# Patient Record
Sex: FEMALE | Race: WHITE | Employment: OTHER | URBAN - NONMETROPOLITAN AREA
[De-identification: names, ages, dates, MRNs, and addresses within clinical notes are randomized per-mention and may not be internally consistent; named-entity substitution may affect disease eponyms.]

---

## 2017-01-28 ENCOUNTER — HOSPITAL ENCOUNTER (OUTPATIENT)
Dept: MRI IMAGING | Age: 82
Discharge: HOME | End: 2017-01-28
Attending: INTERNAL MEDICINE | Admitting: INTERNAL MEDICINE

## 2017-01-28 DIAGNOSIS — R41.3 MEMORY LOSS: ICD-10-CM

## 2017-01-28 DIAGNOSIS — R41.3 OTHER AMNESIA: ICD-10-CM

## 2017-04-03 ENCOUNTER — OFFICE VISIT (OUTPATIENT)
Dept: CARDIOLOGY CLINIC | Age: 82
End: 2017-04-03

## 2017-04-03 VITALS
DIASTOLIC BLOOD PRESSURE: 70 MMHG | HEART RATE: 60 BPM | SYSTOLIC BLOOD PRESSURE: 134 MMHG | HEIGHT: 63 IN | BODY MASS INDEX: 30.12 KG/M2 | WEIGHT: 170 LBS | RESPIRATION RATE: 14 BRPM

## 2017-04-03 DIAGNOSIS — Z79.899 ENCOUNTER FOR MONITORING FLECAINIDE THERAPY: ICD-10-CM

## 2017-04-03 DIAGNOSIS — I10 ESSENTIAL HYPERTENSION: Chronic | ICD-10-CM

## 2017-04-03 DIAGNOSIS — I48.0 PAF (PAROXYSMAL ATRIAL FIBRILLATION) (HCC): ICD-10-CM

## 2017-04-03 DIAGNOSIS — I48.91 ATRIAL FIBRILLATION, UNSPECIFIED TYPE (HCC): Primary | ICD-10-CM

## 2017-04-03 DIAGNOSIS — Z51.81 ENCOUNTER FOR MONITORING FLECAINIDE THERAPY: ICD-10-CM

## 2017-04-03 PROCEDURE — 93000 ELECTROCARDIOGRAM COMPLETE: CPT | Performed by: INTERNAL MEDICINE

## 2017-04-03 PROCEDURE — 99213 OFFICE O/P EST LOW 20 MIN: CPT | Performed by: INTERNAL MEDICINE

## 2017-04-03 RX ORDER — NITROGLYCERIN 0.4 MG/1
0.4 TABLET SUBLINGUAL EVERY 5 MIN PRN
COMMUNITY
End: 2017-10-09

## 2017-04-03 RX ORDER — FLECAINIDE ACETATE 50 MG/1
50 TABLET ORAL 2 TIMES DAILY
COMMUNITY
End: 2017-04-03 | Stop reason: SDUPTHER

## 2017-04-03 RX ORDER — DONEPEZIL HYDROCHLORIDE 10 MG/1
10 TABLET, FILM COATED ORAL NIGHTLY
COMMUNITY

## 2017-04-03 RX ORDER — LEVOTHYROXINE SODIUM 88 UG/1
88 TABLET ORAL DAILY
COMMUNITY
End: 2017-10-09 | Stop reason: DRUGHIGH

## 2017-04-03 RX ORDER — BACLOFEN 10 MG/1
10 TABLET ORAL 2 TIMES DAILY
COMMUNITY

## 2017-04-03 RX ORDER — FLECAINIDE ACETATE 50 MG/1
50 TABLET ORAL 2 TIMES DAILY
Qty: 180 TABLET | Refills: 3 | Status: SHIPPED | OUTPATIENT
Start: 2017-04-03 | End: 2020-09-22 | Stop reason: SDUPTHER

## 2017-10-01 ENCOUNTER — HOSPITAL ENCOUNTER (OUTPATIENT)
Dept: OTHER | Age: 82
Discharge: HOME | End: 2017-10-31
Attending: INTERNAL MEDICINE | Admitting: INTERNAL MEDICINE

## 2017-10-09 ENCOUNTER — OFFICE VISIT (OUTPATIENT)
Dept: CARDIOLOGY CLINIC | Age: 82
End: 2017-10-09

## 2017-10-09 VITALS
HEIGHT: 63 IN | BODY MASS INDEX: 27.82 KG/M2 | HEART RATE: 64 BPM | WEIGHT: 157 LBS | SYSTOLIC BLOOD PRESSURE: 118 MMHG | DIASTOLIC BLOOD PRESSURE: 70 MMHG | RESPIRATION RATE: 16 BRPM

## 2017-10-09 DIAGNOSIS — I48.0 PAF (PAROXYSMAL ATRIAL FIBRILLATION) (HCC): ICD-10-CM

## 2017-10-09 DIAGNOSIS — Z51.81 ENCOUNTER FOR MONITORING FLECAINIDE THERAPY: ICD-10-CM

## 2017-10-09 DIAGNOSIS — Z79.899 ENCOUNTER FOR MONITORING FLECAINIDE THERAPY: ICD-10-CM

## 2017-10-09 DIAGNOSIS — I10 ESSENTIAL HYPERTENSION: Primary | Chronic | ICD-10-CM

## 2017-10-09 PROCEDURE — 99213 OFFICE O/P EST LOW 20 MIN: CPT | Performed by: INTERNAL MEDICINE

## 2017-10-09 PROCEDURE — 93000 ELECTROCARDIOGRAM COMPLETE: CPT | Performed by: INTERNAL MEDICINE

## 2017-10-09 RX ORDER — LEVOTHYROXINE SODIUM 0.07 MG/1
75 TABLET ORAL DAILY
COMMUNITY

## 2017-10-16 LAB
INR BLD: 1.18 INDEX
PROTHROMBIN TIME: 13.5 SECONDS (ref 9.12–12.5)

## 2017-10-23 LAB
INR BLD: 1.23 INDEX
PROTHROMBIN TIME: 14.1 SECONDS

## 2017-11-01 ENCOUNTER — HOSPITAL ENCOUNTER (OUTPATIENT)
Dept: OTHER | Age: 82
Discharge: HOME | End: 2017-11-30
Attending: INTERNAL MEDICINE | Admitting: INTERNAL MEDICINE

## 2017-11-02 LAB
INR BLD: 2.42 INDEX
PROTHROMBIN TIME: 28.4 SECONDS

## 2017-11-09 LAB
INR BLD: 2.94 INDEX
PROTHROMBIN TIME: 34.6 SECONDS

## 2017-11-16 LAB
INR BLD: 2.51 INDEX
PROTHROMBIN TIME: 29.4 SECONDS (ref 9.12–12.5)

## 2017-11-30 LAB
INR BLD: 3.46 INDEX
PROTHROMBIN TIME: 41 SECONDS (ref 9.12–12.5)

## 2017-12-01 ENCOUNTER — HOSPITAL ENCOUNTER (OUTPATIENT)
Dept: OTHER | Age: 82
Discharge: HOME | End: 2017-12-31
Attending: INTERNAL MEDICINE | Admitting: INTERNAL MEDICINE

## 2017-12-07 LAB
INR BLD: 2.37 INDEX
PROTHROMBIN TIME: 26.8 SECONDS (ref 9.12–12.5)

## 2017-12-14 LAB
INR BLD: 2.5 INDEX
PROTHROMBIN TIME: 28.7 SECONDS

## 2017-12-21 LAB
INR BLD: 2.65 INDEX
PROTHROMBIN TIME: 30.5 SECONDS

## 2018-01-01 ENCOUNTER — HOSPITAL ENCOUNTER (OUTPATIENT)
Dept: OTHER | Age: 83
Discharge: HOME | End: 2018-01-31
Attending: INTERNAL MEDICINE | Admitting: INTERNAL MEDICINE

## 2018-01-04 LAB
INR BLD: 2.45 INDEX
PROTHROMBIN TIME: 27.7 SECONDS (ref 9.12–12.5)

## 2018-01-11 LAB
INR BLD: 2.29 INDEX
PROTHROMBIN TIME: 25.9 SECONDS (ref 9.12–12.5)

## 2018-01-18 LAB
INR BLD: 1.58 INDEX
PROTHROMBIN TIME: 18.3 SECONDS

## 2018-01-22 ENCOUNTER — HOSPITAL ENCOUNTER (OUTPATIENT)
Dept: GENERAL RADIOLOGY | Age: 83
Discharge: HOME | End: 2018-01-22
Attending: ORTHOPAEDIC SURGERY | Admitting: ORTHOPAEDIC SURGERY

## 2018-01-22 DIAGNOSIS — M25.551 HIP PAIN, RIGHT: ICD-10-CM

## 2018-01-25 LAB
INR BLD: 1.66 INDEX
PROTHROMBIN TIME: 19.2 SECONDS

## 2018-02-01 ENCOUNTER — HOSPITAL ENCOUNTER (OUTPATIENT)
Dept: OTHER | Age: 83
Discharge: HOME | End: 2018-02-28
Attending: INTERNAL MEDICINE | Admitting: INTERNAL MEDICINE

## 2018-02-01 LAB
INR BLD: 3.1 INDEX
PROTHROMBIN TIME: 34.9 SECONDS (ref 9.12–12.5)

## 2018-02-08 LAB
INR BLD: 4.65 INDEX
PROTHROMBIN TIME: 52.2 SECONDS (ref 9.12–12.5)

## 2018-02-15 LAB
INR BLD: 1.22 INDEX
PROTHROMBIN TIME: 13.9 SECONDS (ref 9.12–12.5)

## 2018-03-01 ENCOUNTER — HOSPITAL ENCOUNTER (OUTPATIENT)
Dept: OTHER | Age: 83
Discharge: HOME | End: 2018-03-31
Attending: INTERNAL MEDICINE | Admitting: INTERNAL MEDICINE

## 2018-03-01 LAB
INR BLD: 4.49 INDEX
PROTHROMBIN TIME: 50.4 SECONDS (ref 9.12–12.5)

## 2018-03-15 LAB
INR BLD: 2.06 INDEX
PROTHROMBIN TIME: 23.3 SECONDS (ref 9.12–12.5)

## 2018-03-22 LAB
INR BLD: 2.4 INDEX
PROTHROMBIN TIME: 27.1 SECONDS (ref 9.12–12.5)

## 2018-04-01 ENCOUNTER — HOSPITAL ENCOUNTER (OUTPATIENT)
Dept: OTHER | Age: 83
Discharge: HOME | End: 2018-04-30
Attending: INTERNAL MEDICINE | Admitting: INTERNAL MEDICINE

## 2018-04-12 LAB
INR BLD: 2.66 INDEX
PROTHROMBIN TIME: 30 SECONDS (ref 9.12–12.5)

## 2018-05-01 ENCOUNTER — HOSPITAL ENCOUNTER (OUTPATIENT)
Dept: OTHER | Age: 83
Discharge: HOME | End: 2018-05-31
Attending: INTERNAL MEDICINE | Admitting: INTERNAL MEDICINE

## 2018-05-10 LAB
INR BLD: 3.51 INDEX
PROTHROMBIN TIME: 39.5 SECONDS (ref 9.12–12.5)

## 2018-05-23 LAB
BUN BLDV-MCNC: 22 MG/DL
CALCIUM SERPL-MCNC: NORMAL MG/DL
CHLORIDE BLD-SCNC: 98 MMOL/L
CO2: NORMAL MMOL/L
CREAT SERPL-MCNC: 1 MG/DL
GFR CALCULATED: NORMAL
GLUCOSE BLD-MCNC: 91 MG/DL
INR BLD: 2.1
INR BLD: 2.42 INDEX
POTASSIUM SERPL-SCNC: 4.2 MMOL/L
PROTHROMBIN TIME: 27.8 SECONDS
PROTIME: 21.7 SECONDS
SODIUM BLD-SCNC: 138 MMOL/L

## 2018-06-01 ENCOUNTER — HOSPITAL ENCOUNTER (OUTPATIENT)
Dept: OTHER | Age: 83
Discharge: HOME | End: 2018-06-30
Attending: INTERNAL MEDICINE | Admitting: INTERNAL MEDICINE

## 2018-06-26 ENCOUNTER — OFFICE VISIT (OUTPATIENT)
Dept: CARDIOLOGY CLINIC | Age: 83
End: 2018-06-26

## 2018-06-26 VITALS
SYSTOLIC BLOOD PRESSURE: 128 MMHG | DIASTOLIC BLOOD PRESSURE: 72 MMHG | HEIGHT: 63 IN | RESPIRATION RATE: 16 BRPM | BODY MASS INDEX: 27.64 KG/M2 | HEART RATE: 56 BPM | WEIGHT: 156 LBS

## 2018-06-26 DIAGNOSIS — I48.0 PAF (PAROXYSMAL ATRIAL FIBRILLATION) (HCC): Primary | ICD-10-CM

## 2018-06-26 DIAGNOSIS — R01.1 HEART MURMUR: ICD-10-CM

## 2018-06-26 PROCEDURE — 99213 OFFICE O/P EST LOW 20 MIN: CPT | Performed by: INTERNAL MEDICINE

## 2018-08-01 ENCOUNTER — HOSPITAL ENCOUNTER (OUTPATIENT)
Dept: OTHER | Age: 83
Discharge: HOME | End: 2018-08-31
Attending: INTERNAL MEDICINE | Admitting: INTERNAL MEDICINE

## 2018-08-10 LAB
INR BLD: 1.21 INDEX
PROTHROMBIN TIME: 13.8 SECONDS (ref 9.12–12.5)

## 2018-08-24 LAB
INR BLD: 1.84 INDEX
PROTHROMBIN TIME: 21.2 SECONDS

## 2018-08-31 LAB
INR BLD: 1.18 INDEX
PROTHROMBIN TIME: 13.4 SECONDS (ref 9.12–12.5)

## 2018-09-01 ENCOUNTER — HOSPITAL ENCOUNTER (OUTPATIENT)
Dept: OTHER | Age: 83
Discharge: HOME | End: 2018-09-01
Attending: INTERNAL MEDICINE | Admitting: INTERNAL MEDICINE

## 2018-09-07 LAB
INR BLD: 1.52 INDEX
PROTHROMBIN TIME: 17.6 SECONDS

## 2018-09-14 LAB
INR BLD: 2.79 INDEX
PROTHROMBIN TIME: 31.5 SECONDS (ref 9.12–12.5)

## 2018-09-21 LAB
INR BLD: 3.68 INDEX
PROTHROMBIN TIME: 42.1 SECONDS (ref 9.12–12.5)

## 2018-09-25 ENCOUNTER — TELEPHONE (OUTPATIENT)
Dept: CARDIOLOGY CLINIC | Age: 83
End: 2018-09-25

## 2018-09-25 NOTE — TELEPHONE ENCOUNTER
Received cardiac clearance request from Dr. Melchor Beltre  for right total hip replacement under general anesthesia. Date of surgery is not yet scheduled. Cardiac risk assessment form and patient's info to Dr. Jacqueline Masterson for review/completion. [id number] Score for Atrial Fibrillation Stroke Risk   Risk   Factors  Component Value   C CHF No 0   H HTN Yes 1   A2 Age >= 76 Yes,  (82 y.o.) 2   D DM No 0   S2 Prior Stroke/TIA No 0   V Vascular Disease No 0   A Age 74-69 No,  (82 y.o.) 0   Sc Sex female 1    [id number]  Score  4   Score last updated 0/31/62 99:11 AM    Click here for a link to the UpToDate guideline \"Atrial Fibrillation: Anticoagulation therapy to prevent embolization    Disclaimer: Risk Score calculation is dependent on accuracy of patient problem list and past encounter diagnosis.

## 2018-09-27 NOTE — TELEPHONE ENCOUNTER
Dr. Rocio Otero completed cardiac risk assessment form and cleared patient to proceed with her surgery. Cardiac risk assessment letter prepared (may be seen under the \"Letters\" tab in [facility]) and faxed to Dr. Elena David office at fax # [phone] per Josse Keating MA. See also cardiac risk assessment form attached.

## 2018-09-28 ENCOUNTER — HOSPITAL ENCOUNTER (OUTPATIENT)
Age: 83
Setting detail: SPECIMEN
Discharge: HOME | End: 2018-09-28
Payer: MEDICARE

## 2018-09-28 LAB
INR BLD: 1.54 INDEX
PROTHROMBIN TIME: 17.5 SECONDS (ref 9.12–12.5)

## 2018-09-28 PROCEDURE — 36415 COLL VENOUS BLD VENIPUNCTURE: CPT

## 2018-09-28 PROCEDURE — 85610 PROTHROMBIN TIME: CPT

## 2018-10-05 ENCOUNTER — HOSPITAL ENCOUNTER (OUTPATIENT)
Age: 83
Setting detail: SPECIMEN
Discharge: HOME | End: 2018-10-05
Payer: MEDICARE

## 2018-10-05 LAB
INR BLD: 3.06 INDEX
PROTHROMBIN TIME: 35.1 SECONDS (ref 9.12–12.5)

## 2018-10-05 PROCEDURE — 36415 COLL VENOUS BLD VENIPUNCTURE: CPT

## 2018-10-05 PROCEDURE — 85610 PROTHROMBIN TIME: CPT

## 2018-10-12 ENCOUNTER — HOSPITAL ENCOUNTER (OUTPATIENT)
Age: 83
Setting detail: SPECIMEN
Discharge: HOME | End: 2018-10-12
Payer: MEDICARE

## 2018-10-12 LAB
INR BLD: 1.71 INDEX
PROTHROMBIN TIME: 19.4 SECONDS (ref 9.12–12.5)

## 2018-10-12 PROCEDURE — 85610 PROTHROMBIN TIME: CPT

## 2018-10-12 PROCEDURE — 36415 COLL VENOUS BLD VENIPUNCTURE: CPT

## 2018-10-19 PROCEDURE — 99305 1ST NF CARE MODERATE MDM 35: CPT | Performed by: INTERNAL MEDICINE

## 2018-10-22 ENCOUNTER — HOSPITAL ENCOUNTER (OUTPATIENT)
Age: 83
Setting detail: SPECIMEN
Discharge: HOME | End: 2018-10-22
Payer: MEDICARE

## 2018-10-22 LAB
ALBUMIN SERPL-MCNC: 3.3 GM/DL (ref 3.4–5)
ALP BLD-CCNC: 52 IU/L (ref 40–129)
ALT SERPL-CCNC: 5 U/L (ref 10–40)
ANION GAP SERPL CALCULATED.3IONS-SCNC: 14 MMOL/L (ref 4–16)
AST SERPL-CCNC: 11 IU/L (ref 15–37)
BILIRUB SERPL-MCNC: 1.3 MG/DL (ref 0–1)
BUN BLDV-MCNC: 14 MG/DL (ref 6–23)
CALCIUM SERPL-MCNC: 9.1 MG/DL (ref 8.3–10.6)
CHLORIDE BLD-SCNC: 96 MMOL/L (ref 99–110)
CO2: 29 MMOL/L (ref 21–32)
CREAT SERPL-MCNC: 0.8 MG/DL (ref 0.6–1.1)
GFR AFRICAN AMERICAN: >60 ML/MIN/1.73M2
GFR NON-AFRICAN AMERICAN: >60 ML/MIN/1.73M2
GLUCOSE BLD-MCNC: 114 MG/DL (ref 70–99)
HCT VFR BLD CALC: 37.2 % (ref 37–47)
HEMOGLOBIN: 11.6 GM/DL (ref 12.5–16)
INR BLD: 2.03 INDEX
MCH RBC QN AUTO: 29.5 PG (ref 27–31)
MCHC RBC AUTO-ENTMCNC: 31.2 % (ref 32–36)
MCV RBC AUTO: 94.7 FL (ref 78–100)
PDW BLD-RTO: 13.6 % (ref 11.7–14.9)
PLATELET # BLD: 310 K/CU MM (ref 140–440)
PMV BLD AUTO: 9.5 FL (ref 7.5–11.1)
POTASSIUM SERPL-SCNC: 3.5 MMOL/L (ref 3.5–5.1)
PRO-BNP: 348.4 PG/ML
PROTHROMBIN TIME: 23 SECONDS (ref 9.12–12.5)
RBC # BLD: 3.93 M/CU MM (ref 4.2–5.4)
SODIUM BLD-SCNC: 139 MMOL/L (ref 135–145)
TOTAL PROTEIN: 5.9 GM/DL (ref 6.4–8.2)
WBC # BLD: 9.9 K/CU MM (ref 4–10.5)

## 2018-10-22 PROCEDURE — 85027 COMPLETE CBC AUTOMATED: CPT

## 2018-10-22 PROCEDURE — 83880 ASSAY OF NATRIURETIC PEPTIDE: CPT

## 2018-10-22 PROCEDURE — 80053 COMPREHEN METABOLIC PANEL: CPT

## 2018-10-22 PROCEDURE — 85610 PROTHROMBIN TIME: CPT

## 2018-10-22 PROCEDURE — 36415 COLL VENOUS BLD VENIPUNCTURE: CPT

## 2018-10-24 ENCOUNTER — HOSPITAL ENCOUNTER (OUTPATIENT)
Age: 83
Setting detail: SPECIMEN
Discharge: HOME | End: 2018-10-24
Payer: COMMERCIAL

## 2018-10-24 LAB
INR BLD: 2.91 INDEX
PROTHROMBIN TIME: 32.8 SECONDS (ref 9.12–12.5)

## 2018-10-24 PROCEDURE — 85610 PROTHROMBIN TIME: CPT

## 2018-10-24 PROCEDURE — 36415 COLL VENOUS BLD VENIPUNCTURE: CPT

## 2018-10-25 ENCOUNTER — HOSPITAL ENCOUNTER (OUTPATIENT)
Age: 83
Setting detail: SPECIMEN
Discharge: HOME | End: 2018-10-25
Payer: COMMERCIAL

## 2018-10-25 LAB — POTASSIUM SERPL-SCNC: 4.3 MMOL/L (ref 3.5–5.1)

## 2018-10-25 PROCEDURE — 36415 COLL VENOUS BLD VENIPUNCTURE: CPT

## 2018-10-25 PROCEDURE — 84132 ASSAY OF SERUM POTASSIUM: CPT

## 2018-10-26 ENCOUNTER — HOSPITAL ENCOUNTER (OUTPATIENT)
Age: 83
Setting detail: SPECIMEN
Discharge: HOME | End: 2018-10-26
Payer: COMMERCIAL

## 2018-10-26 LAB
INR BLD: 1.98 INDEX
PROTHROMBIN TIME: 22.4 SECONDS (ref 9.12–12.5)

## 2018-10-26 PROCEDURE — 36415 COLL VENOUS BLD VENIPUNCTURE: CPT

## 2018-10-26 PROCEDURE — 99308 SBSQ NF CARE LOW MDM 20: CPT | Performed by: INTERNAL MEDICINE

## 2018-10-26 PROCEDURE — 85610 PROTHROMBIN TIME: CPT

## 2018-10-28 ENCOUNTER — OUTSIDE SERVICES (OUTPATIENT)
Dept: INTERNAL MEDICINE CLINIC | Age: 83
End: 2018-10-28
Payer: MEDICARE

## 2018-10-28 DIAGNOSIS — Z96.641 AFTERCARE FOLLOWING RIGHT HIP JOINT REPLACEMENT SURGERY: Primary | ICD-10-CM

## 2018-10-28 DIAGNOSIS — Z47.1 AFTERCARE FOLLOWING RIGHT HIP JOINT REPLACEMENT SURGERY: Primary | ICD-10-CM

## 2018-10-28 DIAGNOSIS — E03.9 ACQUIRED HYPOTHYROIDISM: Chronic | ICD-10-CM

## 2018-10-28 DIAGNOSIS — I48.91 ATRIAL FIBRILLATION, UNSPECIFIED TYPE (HCC): ICD-10-CM

## 2018-10-28 DIAGNOSIS — M16.11 PRIMARY OSTEOARTHRITIS OF RIGHT HIP: ICD-10-CM

## 2018-10-28 DIAGNOSIS — I10 ESSENTIAL HYPERTENSION: Chronic | ICD-10-CM

## 2018-10-29 ENCOUNTER — OUTSIDE SERVICES (OUTPATIENT)
Dept: INTERNAL MEDICINE CLINIC | Age: 83
End: 2018-10-29
Payer: MEDICARE

## 2018-10-29 DIAGNOSIS — I48.91 ATRIAL FIBRILLATION, UNSPECIFIED TYPE (HCC): ICD-10-CM

## 2018-10-29 DIAGNOSIS — Z96.641 AFTERCARE FOLLOWING RIGHT HIP JOINT REPLACEMENT SURGERY: Primary | ICD-10-CM

## 2018-10-29 DIAGNOSIS — M16.11 PRIMARY OSTEOARTHRITIS OF RIGHT HIP: ICD-10-CM

## 2018-10-29 DIAGNOSIS — E03.9 ACQUIRED HYPOTHYROIDISM: ICD-10-CM

## 2018-10-29 DIAGNOSIS — Z47.1 AFTERCARE FOLLOWING RIGHT HIP JOINT REPLACEMENT SURGERY: Primary | ICD-10-CM

## 2018-10-29 PROCEDURE — 99315 NF DSCHRG MGMT 30 MIN/LESS: CPT | Performed by: INTERNAL MEDICINE

## 2018-10-30 ENCOUNTER — OUTSIDE SERVICES (OUTPATIENT)
Dept: INTERNAL MEDICINE CLINIC | Age: 83
End: 2018-10-30
Payer: MEDICARE

## 2018-10-30 ENCOUNTER — HOSPITAL ENCOUNTER (OUTPATIENT)
Age: 83
Setting detail: SPECIMEN
Discharge: HOME | End: 2018-10-30
Payer: MEDICARE

## 2018-10-30 DIAGNOSIS — E03.9 ACQUIRED HYPOTHYROIDISM: Chronic | ICD-10-CM

## 2018-10-30 DIAGNOSIS — M16.11 PRIMARY OSTEOARTHRITIS OF RIGHT HIP: ICD-10-CM

## 2018-10-30 DIAGNOSIS — I48.91 ATRIAL FIBRILLATION, UNSPECIFIED TYPE (HCC): ICD-10-CM

## 2018-10-30 DIAGNOSIS — Z47.1 AFTERCARE FOLLOWING RIGHT HIP JOINT REPLACEMENT SURGERY: Primary | ICD-10-CM

## 2018-10-30 DIAGNOSIS — Z96.641 AFTERCARE FOLLOWING RIGHT HIP JOINT REPLACEMENT SURGERY: Primary | ICD-10-CM

## 2018-10-30 LAB
INR BLD: 2.07 INDEX
PROTHROMBIN TIME: 23.4 SECONDS (ref 9.12–12.5)

## 2018-10-30 PROCEDURE — 85610 PROTHROMBIN TIME: CPT

## 2018-10-30 PROCEDURE — 36415 COLL VENOUS BLD VENIPUNCTURE: CPT

## 2019-01-16 ENCOUNTER — HOSPITAL ENCOUNTER (OUTPATIENT)
Age: 84
Setting detail: SPECIMEN
Discharge: HOME | End: 2019-01-16
Payer: MEDICARE

## 2019-01-16 LAB
INR BLD: 2.09 INDEX
PROTHROMBIN TIME: 24.1 SECONDS (ref 9.12–12.5)

## 2019-01-16 PROCEDURE — 85610 PROTHROMBIN TIME: CPT

## 2019-01-16 PROCEDURE — 36415 COLL VENOUS BLD VENIPUNCTURE: CPT

## 2019-01-23 ENCOUNTER — HOSPITAL ENCOUNTER (OUTPATIENT)
Age: 84
Setting detail: SPECIMEN
Discharge: HOME | End: 2019-01-23
Payer: MEDICARE

## 2019-01-23 LAB
INR BLD: 2.14 INDEX
PROTHROMBIN TIME: 24.6 SECONDS (ref 9.12–12.5)

## 2019-01-23 PROCEDURE — 85610 PROTHROMBIN TIME: CPT

## 2019-01-23 PROCEDURE — 36415 COLL VENOUS BLD VENIPUNCTURE: CPT

## 2019-01-30 ENCOUNTER — HOSPITAL ENCOUNTER (OUTPATIENT)
Age: 84
Setting detail: SPECIMEN
Discharge: HOME | End: 2019-01-30
Payer: MEDICARE

## 2019-01-30 LAB
INR BLD: 1.6 INDEX
PROTHROMBIN TIME: 18.5 SECONDS (ref 9.12–12.5)

## 2019-01-30 PROCEDURE — 36415 COLL VENOUS BLD VENIPUNCTURE: CPT

## 2019-01-30 PROCEDURE — 85610 PROTHROMBIN TIME: CPT

## 2019-02-06 ENCOUNTER — HOSPITAL ENCOUNTER (OUTPATIENT)
Age: 84
Setting detail: SPECIMEN
Discharge: HOME | End: 2019-02-06
Payer: MEDICARE

## 2019-02-06 LAB
INR BLD: 2.36 INDEX
PROTHROMBIN TIME: 26.7 SECONDS (ref 9.12–12.5)

## 2019-02-06 PROCEDURE — 85610 PROTHROMBIN TIME: CPT

## 2019-02-06 PROCEDURE — 36415 COLL VENOUS BLD VENIPUNCTURE: CPT

## 2019-02-13 ENCOUNTER — HOSPITAL ENCOUNTER (OUTPATIENT)
Age: 84
Setting detail: SPECIMEN
Discharge: HOME | End: 2019-02-13
Payer: MEDICARE

## 2019-02-13 LAB
INR BLD: 2.05 INDEX
PROTHROMBIN TIME: 23.2 SECONDS (ref 9.12–12.5)

## 2019-02-13 PROCEDURE — 36415 COLL VENOUS BLD VENIPUNCTURE: CPT

## 2019-02-13 PROCEDURE — 85610 PROTHROMBIN TIME: CPT

## 2019-02-20 ENCOUNTER — HOSPITAL ENCOUNTER (OUTPATIENT)
Age: 84
Setting detail: SPECIMEN
Discharge: HOME | End: 2019-02-20
Payer: MEDICARE

## 2019-02-21 ENCOUNTER — HOSPITAL ENCOUNTER (OUTPATIENT)
Age: 84
Setting detail: SPECIMEN
Discharge: HOME | End: 2019-02-21
Payer: MEDICARE

## 2019-02-21 LAB
INR BLD: 2.18 INDEX
PROTHROMBIN TIME: 24.7 SECONDS (ref 9.12–12.5)

## 2019-02-21 PROCEDURE — 85610 PROTHROMBIN TIME: CPT

## 2019-02-21 PROCEDURE — 36415 COLL VENOUS BLD VENIPUNCTURE: CPT

## 2019-02-27 ENCOUNTER — HOSPITAL ENCOUNTER (OUTPATIENT)
Age: 84
Setting detail: SPECIMEN
Discharge: HOME | End: 2019-02-27
Payer: MEDICARE

## 2019-02-27 LAB
INR BLD: 2.55 INDEX
PROTHROMBIN TIME: 29.3 SECONDS (ref 9.12–12.5)

## 2019-02-27 PROCEDURE — 85610 PROTHROMBIN TIME: CPT

## 2019-02-27 PROCEDURE — 36415 COLL VENOUS BLD VENIPUNCTURE: CPT

## 2019-03-06 ENCOUNTER — HOSPITAL ENCOUNTER (OUTPATIENT)
Age: 84
Setting detail: SPECIMEN
Discharge: HOME | End: 2019-03-06
Payer: MEDICARE

## 2019-03-06 LAB
INR BLD: 2.48 INDEX
PROTHROMBIN TIME: 28 SECONDS (ref 9.12–12.5)

## 2019-03-06 PROCEDURE — 36415 COLL VENOUS BLD VENIPUNCTURE: CPT

## 2019-03-06 PROCEDURE — 85610 PROTHROMBIN TIME: CPT

## 2019-03-13 ENCOUNTER — HOSPITAL ENCOUNTER (OUTPATIENT)
Age: 84
Setting detail: SPECIMEN
Discharge: HOME | End: 2019-03-13
Payer: MEDICARE

## 2019-03-13 LAB
INR BLD: 1.87 INDEX
PROTHROMBIN TIME: 21.2 SECONDS (ref 9.12–12.5)

## 2019-03-13 PROCEDURE — 36415 COLL VENOUS BLD VENIPUNCTURE: CPT

## 2019-03-13 PROCEDURE — 85610 PROTHROMBIN TIME: CPT

## 2019-03-20 ENCOUNTER — HOSPITAL ENCOUNTER (OUTPATIENT)
Age: 84
Discharge: HOME | End: 2019-03-20
Payer: MEDICARE

## 2019-03-20 LAB
INR BLD: 1.65 INDEX
PROTHROMBIN TIME: 18.7 SECONDS (ref 9.12–12.5)

## 2019-03-20 PROCEDURE — 85610 PROTHROMBIN TIME: CPT

## 2019-03-20 PROCEDURE — 36415 COLL VENOUS BLD VENIPUNCTURE: CPT

## 2019-04-03 ENCOUNTER — HOSPITAL ENCOUNTER (OUTPATIENT)
Age: 84
Setting detail: SPECIMEN
Discharge: HOME | End: 2019-04-03
Payer: MEDICARE

## 2019-04-03 LAB
INR BLD: 2.93 INDEX
PROTHROMBIN TIME: 33 SECONDS (ref 9.12–12.5)

## 2019-04-03 PROCEDURE — 85610 PROTHROMBIN TIME: CPT

## 2019-04-03 PROCEDURE — 36415 COLL VENOUS BLD VENIPUNCTURE: CPT

## 2019-04-25 ENCOUNTER — HOSPITAL ENCOUNTER (OUTPATIENT)
Age: 84
Setting detail: SPECIMEN
Discharge: HOME | End: 2019-04-25
Payer: MEDICARE

## 2019-04-25 LAB
INR BLD: 4.99 INDEX
PROTHROMBIN TIME: 56 SECONDS (ref 9.12–12.5)

## 2019-04-25 PROCEDURE — 85610 PROTHROMBIN TIME: CPT

## 2019-04-25 PROCEDURE — 36415 COLL VENOUS BLD VENIPUNCTURE: CPT

## 2019-04-30 ENCOUNTER — HOSPITAL ENCOUNTER (OUTPATIENT)
Age: 84
Setting detail: SPECIMEN
Discharge: HOME | End: 2019-04-30
Payer: MEDICARE

## 2019-04-30 LAB
INR BLD: 2.64 INDEX
PROTHROMBIN TIME: 30.3 SECONDS (ref 9.12–12.5)

## 2019-04-30 PROCEDURE — 85610 PROTHROMBIN TIME: CPT

## 2019-04-30 PROCEDURE — 36415 COLL VENOUS BLD VENIPUNCTURE: CPT

## 2019-05-10 ENCOUNTER — HOSPITAL ENCOUNTER (OUTPATIENT)
Age: 84
Setting detail: SPECIMEN
Discharge: HOME | End: 2019-05-10
Payer: MEDICARE

## 2019-05-10 LAB
INR BLD: 1.79 INDEX
PROTHROMBIN TIME: 20.3 SECONDS (ref 9.12–12.5)

## 2019-05-10 PROCEDURE — 36415 COLL VENOUS BLD VENIPUNCTURE: CPT

## 2019-05-10 PROCEDURE — 85610 PROTHROMBIN TIME: CPT

## 2019-05-13 ENCOUNTER — HOSPITAL ENCOUNTER (OUTPATIENT)
Dept: ULTRASOUND IMAGING | Age: 84
Discharge: HOME | End: 2019-05-13
Payer: MEDICARE

## 2019-05-13 DIAGNOSIS — R22.2 ABDOMINAL WALL MASS: ICD-10-CM

## 2019-05-13 PROCEDURE — 76705 ECHO EXAM OF ABDOMEN: CPT

## 2019-05-17 ENCOUNTER — HOSPITAL ENCOUNTER (OUTPATIENT)
Age: 84
Setting detail: SPECIMEN
Discharge: HOME | End: 2019-05-17
Payer: MEDICARE

## 2019-05-17 LAB
INR BLD: 2.19 INDEX
PROTHROMBIN TIME: 24.8 SECONDS (ref 9.12–12.5)

## 2019-05-17 PROCEDURE — 36415 COLL VENOUS BLD VENIPUNCTURE: CPT

## 2019-05-17 PROCEDURE — 85610 PROTHROMBIN TIME: CPT

## 2019-05-24 ENCOUNTER — HOSPITAL ENCOUNTER (OUTPATIENT)
Age: 84
Setting detail: SPECIMEN
Discharge: HOME | End: 2019-05-24
Payer: MEDICARE

## 2019-05-24 LAB
INR BLD: 1.65 INDEX
PROTHROMBIN TIME: 19.1 SECONDS (ref 9.12–12.5)

## 2019-05-24 PROCEDURE — 85610 PROTHROMBIN TIME: CPT

## 2019-05-24 PROCEDURE — 36415 COLL VENOUS BLD VENIPUNCTURE: CPT

## 2019-05-31 ENCOUNTER — HOSPITAL ENCOUNTER (OUTPATIENT)
Age: 84
Setting detail: SPECIMEN
Discharge: HOME | End: 2019-05-31
Payer: MEDICARE

## 2019-05-31 LAB
INR BLD: 3.44 INDEX
PROTHROMBIN TIME: 39.4 SECONDS (ref 9.12–12.5)

## 2019-05-31 PROCEDURE — 36415 COLL VENOUS BLD VENIPUNCTURE: CPT

## 2019-05-31 PROCEDURE — 85610 PROTHROMBIN TIME: CPT

## 2019-06-07 ENCOUNTER — HOSPITAL ENCOUNTER (OUTPATIENT)
Age: 84
Setting detail: SPECIMEN
Discharge: HOME | End: 2019-06-07
Payer: MEDICARE

## 2019-06-07 LAB
INR BLD: 4.44 INDEX
PROTHROMBIN TIME: 49.9 SECONDS (ref 9.12–12.5)

## 2019-06-07 PROCEDURE — 36415 COLL VENOUS BLD VENIPUNCTURE: CPT

## 2019-06-07 PROCEDURE — 85610 PROTHROMBIN TIME: CPT

## 2019-06-13 ENCOUNTER — ANESTHESIA EVENT (OUTPATIENT)
Dept: OPERATING ROOM | Age: 84
End: 2019-06-13
Payer: MEDICARE

## 2019-06-13 ENCOUNTER — HOSPITAL ENCOUNTER (OUTPATIENT)
Age: 84
Setting detail: SPECIMEN
Discharge: HOME | End: 2019-06-13
Payer: MEDICARE

## 2019-06-13 LAB
INR BLD: 1.21 INDEX
PROTHROMBIN TIME: 14 SECONDS (ref 9.12–12.5)

## 2019-06-13 PROCEDURE — 85610 PROTHROMBIN TIME: CPT

## 2019-06-13 PROCEDURE — 36415 COLL VENOUS BLD VENIPUNCTURE: CPT

## 2019-06-14 ENCOUNTER — HOSPITAL ENCOUNTER (OUTPATIENT)
Age: 84
Setting detail: OUTPATIENT SURGERY
Discharge: HOME | End: 2019-06-14
Attending: OPHTHALMOLOGY | Admitting: OPHTHALMOLOGY
Payer: MEDICARE

## 2019-06-14 ENCOUNTER — ANESTHESIA (OUTPATIENT)
Dept: OPERATING ROOM | Age: 84
End: 2019-06-14
Payer: MEDICARE

## 2019-06-14 VITALS
OXYGEN SATURATION: 99 % | SYSTOLIC BLOOD PRESSURE: 121 MMHG | DIASTOLIC BLOOD PRESSURE: 56 MMHG | RESPIRATION RATE: 21 BRPM

## 2019-06-14 VITALS
WEIGHT: 156 LBS | DIASTOLIC BLOOD PRESSURE: 65 MMHG | TEMPERATURE: 97.2 F | HEART RATE: 59 BPM | BODY MASS INDEX: 26.63 KG/M2 | RESPIRATION RATE: 18 BRPM | OXYGEN SATURATION: 98 % | HEIGHT: 64 IN | SYSTOLIC BLOOD PRESSURE: 139 MMHG

## 2019-06-14 PROBLEM — H25.11 AGE-RELATED NUCLEAR CATARACT OF RIGHT EYE: Status: ACTIVE | Noted: 2019-06-14

## 2019-06-14 PROCEDURE — 2709999900 HC NON-CHARGEABLE SUPPLY: Performed by: OPHTHALMOLOGY

## 2019-06-14 PROCEDURE — V2632 POST CHMBR INTRAOCULAR LENS: HCPCS | Performed by: OPHTHALMOLOGY

## 2019-06-14 PROCEDURE — 6370000000 HC RX 637 (ALT 250 FOR IP): Performed by: OPHTHALMOLOGY

## 2019-06-14 PROCEDURE — 3700000000 HC ANESTHESIA ATTENDED CARE: Performed by: OPHTHALMOLOGY

## 2019-06-14 PROCEDURE — 6360000002 HC RX W HCPCS: Performed by: OPHTHALMOLOGY

## 2019-06-14 PROCEDURE — 3600000004 HC SURGERY LEVEL 4 BASE: Performed by: OPHTHALMOLOGY

## 2019-06-14 PROCEDURE — 6360000002 HC RX W HCPCS: Performed by: NURSE ANESTHETIST, CERTIFIED REGISTERED

## 2019-06-14 PROCEDURE — 3600000014 HC SURGERY LEVEL 4 ADDTL 15MIN: Performed by: OPHTHALMOLOGY

## 2019-06-14 PROCEDURE — 3700000001 HC ADD 15 MINUTES (ANESTHESIA): Performed by: OPHTHALMOLOGY

## 2019-06-14 PROCEDURE — 7100000010 HC PHASE II RECOVERY - FIRST 15 MIN: Performed by: OPHTHALMOLOGY

## 2019-06-14 PROCEDURE — 2500000003 HC RX 250 WO HCPCS: Performed by: OPHTHALMOLOGY

## 2019-06-14 PROCEDURE — 7100000011 HC PHASE II RECOVERY - ADDTL 15 MIN: Performed by: OPHTHALMOLOGY

## 2019-06-14 PROCEDURE — 2580000003 HC RX 258: Performed by: OPHTHALMOLOGY

## 2019-06-14 DEVICE — AKREOS AO MICRO INCISION LENS +0.00D
Type: IMPLANTABLE DEVICE | Site: EYE | Status: FUNCTIONAL
Brand: AKREOS® AO IOL

## 2019-06-14 RX ORDER — PROPOFOL 10 MG/ML
INJECTION, EMULSION INTRAVENOUS PRN
Status: DISCONTINUED | OUTPATIENT
Start: 2019-06-14 | End: 2019-06-14 | Stop reason: SDUPTHER

## 2019-06-14 RX ORDER — NEOMYCIN SULFATE, POLYMYXIN B SULFATE, AND DEXAMETHASONE 3.5; 10000; 1 MG/G; [USP'U]/G; MG/G
OINTMENT OPHTHALMIC
Status: COMPLETED | OUTPATIENT
Start: 2019-06-14 | End: 2019-06-14

## 2019-06-14 RX ORDER — LIDOCAINE HYDROCHLORIDE 20 MG/ML
INJECTION, SOLUTION EPIDURAL; INFILTRATION; INTRACAUDAL; PERINEURAL
Status: COMPLETED | OUTPATIENT
Start: 2019-06-14 | End: 2019-06-14

## 2019-06-14 RX ORDER — SODIUM CHLORIDE 0.9 % (FLUSH) 0.9 %
10 SYRINGE (ML) INJECTION ONCE
Status: COMPLETED | OUTPATIENT
Start: 2019-06-14 | End: 2019-06-14

## 2019-06-14 RX ORDER — CEFAZOLIN SODIUM 1 G/3ML
INJECTION, POWDER, FOR SOLUTION INTRAMUSCULAR; INTRAVENOUS
Status: COMPLETED | OUTPATIENT
Start: 2019-06-14 | End: 2019-06-14

## 2019-06-14 RX ORDER — CYCLOPENTOLATE HYDROCHLORIDE 10 MG/ML
1 SOLUTION/ DROPS OPHTHALMIC SEE ADMIN INSTRUCTIONS
Status: DISCONTINUED | OUTPATIENT
Start: 2019-06-14 | End: 2019-06-14 | Stop reason: HOSPADM

## 2019-06-14 RX ORDER — BETAMETHASONE SODIUM PHOSPHATE AND BETAMETHASONE ACETATE 3; 3 MG/ML; MG/ML
INJECTION, SUSPENSION INTRA-ARTICULAR; INTRALESIONAL; INTRAMUSCULAR; SOFT TISSUE
Status: COMPLETED | OUTPATIENT
Start: 2019-06-14 | End: 2019-06-14

## 2019-06-14 RX ORDER — TROPICAMIDE 10 MG/ML
1 SOLUTION/ DROPS OPHTHALMIC SEE ADMIN INSTRUCTIONS
Status: DISCONTINUED | OUTPATIENT
Start: 2019-06-14 | End: 2019-06-14 | Stop reason: HOSPADM

## 2019-06-14 RX ORDER — PHENYLEPHRINE HCL 2.5 %
1 DROPS OPHTHALMIC (EYE) SEE ADMIN INSTRUCTIONS
Status: DISCONTINUED | OUTPATIENT
Start: 2019-06-14 | End: 2019-06-14 | Stop reason: HOSPADM

## 2019-06-14 RX ORDER — SODIUM CHLORIDE, SODIUM LACTATE, POTASSIUM CHLORIDE, CALCIUM CHLORIDE 600; 310; 30; 20 MG/100ML; MG/100ML; MG/100ML; MG/100ML
INJECTION, SOLUTION INTRAVENOUS CONTINUOUS
Status: DISCONTINUED | OUTPATIENT
Start: 2019-06-14 | End: 2019-06-14 | Stop reason: HOSPADM

## 2019-06-14 RX ORDER — TETRACAINE HYDROCHLORIDE 5 MG/ML
SOLUTION OPHTHALMIC
Status: COMPLETED | OUTPATIENT
Start: 2019-06-14 | End: 2019-06-14

## 2019-06-14 RX ADMIN — PHENYLEPHRINE HYDROCHLORIDE 1 DROP: 25 SOLUTION/ DROPS OPHTHALMIC at 07:15

## 2019-06-14 RX ADMIN — TROPICAMIDE 1 DROP: 10 SOLUTION/ DROPS OPHTHALMIC at 07:15

## 2019-06-14 RX ADMIN — PROPOFOL 25 MG: 10 INJECTION, EMULSION INTRAVENOUS at 08:11

## 2019-06-14 RX ADMIN — CYCLOPENTOLATE HYDROCHLORIDE 1 DROP: 10 SOLUTION/ DROPS OPHTHALMIC at 07:41

## 2019-06-14 RX ADMIN — PHENYLEPHRINE HYDROCHLORIDE 1 DROP: 25 SOLUTION/ DROPS OPHTHALMIC at 07:41

## 2019-06-14 RX ADMIN — PHENYLEPHRINE HYDROCHLORIDE 1 DROP: 25 SOLUTION/ DROPS OPHTHALMIC at 07:01

## 2019-06-14 RX ADMIN — Medication 10 ML: at 07:15

## 2019-06-14 RX ADMIN — SODIUM CHLORIDE, POTASSIUM CHLORIDE, SODIUM LACTATE AND CALCIUM CHLORIDE: 600; 310; 30; 20 INJECTION, SOLUTION INTRAVENOUS at 07:15

## 2019-06-14 RX ADMIN — TROPICAMIDE 1 DROP: 10 SOLUTION/ DROPS OPHTHALMIC at 07:41

## 2019-06-14 RX ADMIN — CYCLOPENTOLATE HYDROCHLORIDE 1 DROP: 10 SOLUTION/ DROPS OPHTHALMIC at 07:01

## 2019-06-14 RX ADMIN — CYCLOPENTOLATE HYDROCHLORIDE 1 DROP: 10 SOLUTION/ DROPS OPHTHALMIC at 07:15

## 2019-06-14 RX ADMIN — TROPICAMIDE 1 DROP: 10 SOLUTION/ DROPS OPHTHALMIC at 07:01

## 2019-06-14 ASSESSMENT — PULMONARY FUNCTION TESTS
PIF_VALUE: 0
PIF_VALUE: 1
PIF_VALUE: 0

## 2019-06-14 ASSESSMENT — PAIN SCALES - GENERAL: PAINLEVEL_OUTOF10: 0

## 2019-06-14 NOTE — ANESTHESIA PRE PROCEDURE
Department of Anesthesiology  Preprocedure Note       Name:  Dionna Mccain   Age:  82 y.o.  :  1935                                          MRN:  [id number]         Date:  2019      Surgeon: Rashid Postal):  Joann Garvey MD    Procedure: EYE CATARACT EMULSIFICATION IOL IMPLANT (Right Eye)    Medications prior to admission:   Prior to Admission medications    Medication Sig Start Date End Date Taking? Authorizing Provider   metoprolol tartrate (LOPRESSOR) 25 MG tablet TAKE ONE TABLET BY MOUTH TWICE A DAY 19  Yes Cheryl Lopez MD   levothyroxine (SYNTHROID) 75 MCG tablet Take 75 mcg by mouth Daily   Yes Historical Provider, MD   donepezil (ARICEPT) 10 MG tablet Take 10 mg by mouth nightly   Yes Historical Provider, MD   baclofen (LIORESAL) 10 MG tablet Take 10 mg by mouth 2 times daily   Yes Historical Provider, MD   flecainide (TAMBOCOR) 50 MG tablet Take 1 tablet by mouth 2 times daily 4/3/17  Yes Cheryl Lopez MD   furosemide (LASIX) 40 MG tablet 40 mg daily  16  Yes Historical Provider, MD   omeprazole (PRILOSEC) 40 MG capsule Take 40 mg by mouth daily   Yes Historical Provider, MD   warfarin (COUMADIN) 5 MG tablet Take 1 tablet by mouth daily  Patient taking differently: Take 2.5 mg by mouth daily  16  Yes Emely Mcneil MD       Current medications:    No current facility-administered medications for this encounter.       Current Outpatient Medications   Medication Sig Dispense Refill    metoprolol tartrate (LOPRESSOR) 25 MG tablet TAKE ONE TABLET BY MOUTH TWICE A  tablet 0    levothyroxine (SYNTHROID) 75 MCG tablet Take 75 mcg by mouth Daily      donepezil (ARICEPT) 10 MG tablet Take 10 mg by mouth nightly      baclofen (LIORESAL) 10 MG tablet Take 10 mg by mouth 2 times daily      flecainide (TAMBOCOR) 50 MG tablet Take 1 tablet by mouth 2 times daily 180 tablet 3    furosemide (LASIX) 40 MG tablet 40 mg daily       omeprazole (PRILOSEC) 40 MG capsule Take 40 mg by mouth daily      warfarin (COUMADIN) 5 MG tablet Take 1 tablet by mouth daily (Patient taking differently: Take 2.5 mg by mouth daily ) 30 tablet 3       Allergies:  No Known Allergies    Problem List:    Patient Active Problem List   Diagnosis Code    Nocturnal leg cramps G47.62    Restless leg syndrome, controlled G25.81    HTN (hypertension) I10    Hypothyroid E03.9    Vertigo R42    Acquired hypothyroidism E03.9    Atrial fibrillation (HCC) I48.91    Encounter for monitoring flecainide therapy Z51.81, Z79.899    Easy bruising R23.8    Heart murmur R01.1    Aftercare following right hip joint replacement surgery Z47.1, Z96.641    Primary osteoarthritis of right hip M16.11       Past Medical History:        Diagnosis Date    Arthritis     Diverticulitis     Easy bruising 5/10/2016    H/O cardiovascular stress test 4/23/16    Normal stress test. EF 88%    H/O echocardiogram 4/23/16    Aortic sclerosis with mild stenosis. Mild concentric LVH with preserved systolic function. Mild MR and TR EF 50-55%     Heart murmur 6/26/2018    Mild aortic stenosis.  History of Holter monitoring 6/9/2016    Normal Holter findings suggestive of normal sinus rhythm w/rare complex supraventricular ectopy without any clinically significant arrhythmias.  Hyperlipidemia     Hypertension     Leg cramps     PAF (paroxysmal atrial fibrillation) ([address] 5/10/2016    Controled on flecainide     Thyroid disease        Past Surgical History:        Procedure Laterality Date    ABDOMEN SURGERY      colon surgery, had diverticulitis, had colon resection    CHOLECYSTECTOMY      COLONOSCOPY      EYE SURGERY  06/2018    JOINT REPLACEMENT      elvia knee replacement    JOINT REPLACEMENT      left shoulder replacement    THYROID SURGERY      goiter       Social History:    Social History     Tobacco Use    Smoking status: Never Smoker    Smokeless tobacco: Never Used   Substance Use Topics    Alcohol use:  No Counseling given: Not Answered      Vital Signs (Current):   Vitals:    06/13/19 0956   Weight: 156 lb (70.8 kg)   Height: 5' 3.5\" (1.613 m)                                              BP Readings from Last 3 Encounters:   06/26/18 128/72   10/09/17 118/70   04/03/17 134/70       NPO Status:                     12 hrs. BMI:   Wt Readings from Last 3 Encounters:   06/26/18 156 lb (70.8 kg)   10/09/17 157 lb (71.2 kg)   04/03/17 170 lb (77.1 kg)     Body mass index is 27.2 kg/m². CBC:   Lab Results   Component Value Date    WBC 9.9 10/22/2018    RBC 3.93 10/22/2018    HGB 11.6 10/22/2018    HCT 37.2 10/22/2018    MCV 94.7 10/22/2018    RDW 13.6 10/22/2018     10/22/2018       CMP:   Lab Results   Component Value Date     10/22/2018    K 4.3 10/25/2018    CL 96 10/22/2018    CO2 29 10/22/2018    BUN 14 10/22/2018    CREATININE 0.8 10/22/2018    GFRAA >60 10/22/2018    AGRATIO 1.5 08/05/2013    LABGLOM >60 10/22/2018    GLUCOSE 114 10/22/2018    PROT 5.9 10/22/2018    CALCIUM 9.1 10/22/2018    BILITOT 1.3 10/22/2018    ALKPHOS 52 10/22/2018    AST 11 10/22/2018    ALT 5 10/22/2018       POC Tests: No results for input(s): POCGLU, POCNA, POCK, POCCL, POCBUN, POCHEMO, POCHCT in the last 72 hours.     Coags:   Lab Results   Component Value Date    PROTIME 14.0 06/13/2019    PROTIME 21.7 05/23/2018    INR 1.21 06/13/2019    APTT 44.9 04/21/2016       HCG (If Applicable): No results found for: PREGTESTUR, PREGSERUM, HCG, HCGQUANT     ABGs: No results found for: PHART, PO2ART, WWS1KFJ, GKC4LCN, BEART, M0PYEEXP     Type & Screen (If Applicable):  No results found for: LABABO, [facility]    Anesthesia Evaluation  Patient summary reviewed and Nursing notes reviewed  Airway: Mallampati: II  TM distance: >3 FB   Neck ROM: full  Mouth opening: > = 3 FB Dental:    (+) edentulous      Pulmonary:Negative Pulmonary ROS and normal exam Cardiovascular:  Exercise tolerance: good (>4 METS),   (+) hypertension:, valvular problems/murmurs: AS, dysrhythmias: atrial fibrillation,          Beta Blocker:  Dose within 24 Hrs      ROS comment: cardiovascular stress test 4/23/2016:  Normal stress test. EF 88%     echocardiogram 4/23/2016: Aortic sclerosis with mild stenosis. Mild concentric LVH with preserved systolic function. Mild MR and TR EF 50-55%      Neuro/Psych:   Negative Neuro/Psych ROS               ROS comment: Restless leg syndrome GI/Hepatic/Renal:   (+) GERD:,           Endo/Other:    (+) hypothyroidism::., .                 Abdominal:           Vascular: negative vascular ROS. Anesthesia Plan      MAC     ASA 3       Induction: intravenous. Anesthetic plan and risks discussed with patient. GAUDENCIO Toussaint CRNA   6/13/2019       Pre Anesthesia Evaluation complete. Anesthesia plan, risks, benefits, alternatives, and personnel discussed with patient and/or legal guardian. Patient and/or legal guardian verbalized an understanding and agreed to proceed. Anesthesia plan discussed with care team members and agreed upon.   GAUDENCIO Toussaint CRNA  6/14/2019

## 2019-06-14 NOTE — OP NOTE
ACCOUNT NUMBER:  PATIENT NAME: Junior Valenzuela  SURGEON:Angel Rollins [address]  AngyRyan gallovivian Ng    OPERATIVE REPORT          DATE OF OPERATION: _6/14/2019_  PREOPERTIVE DIAGNOIS:  CataractOD eye. POSTOPERTIVE DIAGNOSIS:  Cataract_OD eye. PROCEDURE:  Phacoemulsification of Cataract with intraocular lens      implant _OD eye. ANESTHESIA:  Monitored anesthesia care with 2% Xylocaine in a       retrobullar injection. DESCRIPTION OF PROCEDURE:  Under adequate local anesthesia, a lid speculum was placed in the eye to expose the globe. Conjunctival flap was developed from 10 to 12 oclock position. Hemostasis was obtained using wet-field cautery. A 69-Bever blade was than utilized to make the initial scleral incision to a mid depth. Using a 55/22 blade a shelved incision was made into the anterior chamber. Paracentesis site was made at the 2 oclock position with a 75-Bever blade. Healon was instilled and anterior capsuiorrhexis was performed. Hydrodilssection with BSS was carried out. Utilizing the phaco-emulsifier, the phacoemulsification of the cataract was accomplished. Using the irrigation-aspiration unit the remaining cortical material was removed. The intraocular lens was inspected and inserted into the bag with no difficulty after healon was instilled in the anterior chamber. The healon was removed with irrigation-aspiration unit and the wound was inspected to be self-sealing. The conjunctiva flap was then reapproximated with cautery. Sub-Tenon injection of 0.5 ml of Celestone and 0.5 ml Ancef was given. NeoDecadron was placed in the cul-de-sac. The patients eye was patched and shielded. The patient was returned to recovery in satisfactory condition with no complications from surgery or anesthesia.     The patient was discharged on the same day with home-going instructions and no bending, lifting, or straining, The patient is to keep the patch on at all times and follow up in my office in 1 day.                    _______________________           Sharifa Valenzuela M.D.

## 2019-06-14 NOTE — PROGRESS NOTES
Pt. Returned to [facility] via cart from the OR. Brakes applied, side rails up x 2. On room air. Denies pain or nausea. Patch/shield over right eye. VS stable. IV infusing without difficulty. Bedside report received from [place], [place] and Mandy Prado CRNA. Daughter at bedside. [facility] given per pt. Request.  Call light in reach. Will continue to monitor.

## 2019-06-14 NOTE — ANESTHESIA POSTPROCEDURE EVALUATION
Department of Anesthesiology  Postprocedure Note    Patient: Danielle Noriega  MRN: [id number]  YOB: 1935  Date of evaluation: 6/14/2019  Time:  8:27 AM     Procedure Summary     Date:  06/14/19 Room / Location:  [place] [address] / [facility] OR    Anesthesia Start:  0806 Anesthesia Stop:  6715    Procedure:  EYE CATARACT EMULSIFICATION IOL IMPLANT (Right Eye) Diagnosis:  (CATARACT RIGHT EYE)    Surgeon:  Ebonie Kelley MD Responsible Provider:  GAUDENCIO Brown CRNA    Anesthesia Type:  MAC ASA Status:  3          Anesthesia Type: MAC    Celestino Phase I: Celestino Score: 10    Celestino Phase II:  10    Last vitals: Reviewed and per EMR flowsheets.        Anesthesia Post Evaluation    Patient location during evaluation: bedside  Patient participation: complete - patient participated  Level of consciousness: awake and alert  Pain score: 0  Airway patency: patent  Nausea & Vomiting: no nausea and no vomiting  Complications: no  Cardiovascular status: hemodynamically stable  Respiratory status: acceptable, nonlabored ventilation, room air and spontaneous ventilation  Hydration status: euvolemic

## 2019-06-21 ENCOUNTER — HOSPITAL ENCOUNTER (OUTPATIENT)
Age: 84
Setting detail: SPECIMEN
Discharge: HOME | End: 2019-06-21
Payer: MEDICARE

## 2019-06-21 LAB
INR BLD: 1.59 INDEX
PROTHROMBIN TIME: 18.4 SECONDS (ref 9.12–12.5)

## 2019-06-21 PROCEDURE — 36415 COLL VENOUS BLD VENIPUNCTURE: CPT

## 2019-06-21 PROCEDURE — 85610 PROTHROMBIN TIME: CPT

## 2019-06-27 ENCOUNTER — ANESTHESIA EVENT (OUTPATIENT)
Dept: OPERATING ROOM | Age: 84
End: 2019-06-27
Payer: MEDICARE

## 2019-06-27 ENCOUNTER — HOSPITAL ENCOUNTER (OUTPATIENT)
Age: 84
Setting detail: SPECIMEN
Discharge: HOME | End: 2019-06-27
Payer: MEDICARE

## 2019-06-27 LAB
INR BLD: 1.46 INDEX
PROTHROMBIN TIME: 16.9 SECONDS (ref 9.12–12.5)

## 2019-06-27 PROCEDURE — 85610 PROTHROMBIN TIME: CPT

## 2019-06-27 PROCEDURE — 36415 COLL VENOUS BLD VENIPUNCTURE: CPT

## 2019-06-27 NOTE — ANESTHESIA PRE PROCEDURE
Department of Anesthesiology  Preprocedure Note       Name:  Emelia Navarrete   Age:  82 y.o.  :  1935                                          MRN:  [id number]         Date:  2019      Surgeon: Mohini Meredith):  Andi Ramirez MD    Procedure: EYE CATARACT EMULSIFICATION IOL IMPLANT (Left Eye)    Medications prior to admission:   Prior to Admission medications    Medication Sig Start Date End Date Taking?  Authorizing Provider   metoprolol tartrate (LOPRESSOR) 25 MG tablet TAKE ONE TABLET BY MOUTH TWICE A DAY 19   Miguel A Lion MD   levothyroxine (SYNTHROID) 75 MCG tablet Take 75 mcg by mouth Daily    Historical Provider, MD   donepezil (ARICEPT) 10 MG tablet Take 10 mg by mouth nightly    Historical Provider, MD   baclofen (LIORESAL) 10 MG tablet Take 10 mg by mouth 2 times daily    Historical Provider, MD   flecainide (TAMBOCOR) 50 MG tablet Take 1 tablet by mouth 2 times daily 4/3/17   Miguel A Lion MD   furosemide (LASIX) 40 MG tablet 40 mg daily  16   Historical Provider, MD   omeprazole (PRILOSEC) 40 MG capsule Take 40 mg by mouth daily    Historical Provider, MD   warfarin (COUMADIN) 5 MG tablet Take 1 tablet by mouth daily  Patient taking differently: Take 2.5 mg by mouth daily  16   Tariq Mcghee MD       Current medications:    Current Outpatient Medications   Medication Sig Dispense Refill    metoprolol tartrate (LOPRESSOR) 25 MG tablet TAKE ONE TABLET BY MOUTH TWICE A  tablet 0    levothyroxine (SYNTHROID) 75 MCG tablet Take 75 mcg by mouth Daily      donepezil (ARICEPT) 10 MG tablet Take 10 mg by mouth nightly      baclofen (LIORESAL) 10 MG tablet Take 10 mg by mouth 2 times daily      flecainide (TAMBOCOR) 50 MG tablet Take 1 tablet by mouth 2 times daily 180 tablet 3    furosemide (LASIX) 40 MG tablet 40 mg daily       omeprazole (PRILOSEC) 40 MG capsule Take 40 mg by mouth daily      warfarin (COUMADIN) 5 MG tablet Take 1 tablet by mouth daily (Patient taking differently: Take 2.5 mg by mouth daily ) 30 tablet 3     No current facility-administered medications for this visit. Allergies:  No Known Allergies    Problem List:    Patient Active Problem List   Diagnosis Code    Nocturnal leg cramps G47.62    Restless leg syndrome, controlled G25.81    HTN (hypertension) I10    Hypothyroid E03.9    Vertigo R42    Acquired hypothyroidism E03.9    Atrial fibrillation (HCC) I48.91    Encounter for monitoring flecainide therapy Z51.81, Z79.899    Easy bruising R23.8    Heart murmur R01.1    Aftercare following right hip joint replacement surgery Z47.1, Z96.641    Primary osteoarthritis of right hip M16.11    Age-related nuclear cataract of right eye H25.11       Past Medical History:        Diagnosis Date    Arthritis     Diverticulitis     Easy bruising 5/10/2016    H/O cardiovascular stress test 4/23/16    Normal stress test. EF 88%    H/O echocardiogram 4/23/16    Aortic sclerosis with mild stenosis. Mild concentric LVH with preserved systolic function. Mild MR and TR EF 50-55%     Heart murmur 6/26/2018    Mild aortic stenosis.  History of Holter monitoring 6/9/2016    Normal Holter findings suggestive of normal sinus rhythm w/rare complex supraventricular ectopy without any clinically significant arrhythmias.     Hyperlipidemia     Hypertension     Leg cramps     PAF (paroxysmal atrial fibrillation) ([address] 5/10/2016    Controled on flecainide     Thyroid disease        Past Surgical History:        Procedure Laterality Date    ABDOMEN SURGERY      colon surgery, had diverticulitis, had colon resection    CATARACT REMOVAL WITH IMPLANT Right 06/14/2019    by Dr. Chowdary Shock  06/2018    INTRACAPSULAR CATARACT EXTRACTION Right 6/14/2019    EYE CATARACT EMULSIFICATION IOL IMPLANT performed by Neli Boss MD at [address] [place] [address] knee replacement    JOINT REPLACEMENT Evaluation  Patient summary reviewed and Nursing notes reviewed no history of anesthetic complications:   Airway: Mallampati: II  TM distance: >3 FB   Neck ROM: full  Mouth opening: > = 3 FB Dental:    (+) edentulous      Pulmonary:Negative Pulmonary ROS and normal exam                               Cardiovascular:  Exercise tolerance: good (>4 METS),   (+) hypertension:, valvular problems/murmurs: AS, dysrhythmias: atrial fibrillation,          Beta Blocker:  Dose within 24 Hrs      ROS comment: cardiovascular stress test 4/23/2016:  Normal stress test. EF 88%     echocardiogram 4/23/2016: Aortic sclerosis with mild stenosis. Mild concentric LVH with preserved systolic function. Mild MR and TR EF 50-55%      Neuro/Psych:   Negative Neuro/Psych ROS               ROS comment: Restless leg syndrome GI/Hepatic/Renal:   (+) GERD:,           Endo/Other:    (+) hypothyroidism::., .                 Abdominal:           Vascular: negative vascular ROS. Anesthesia Plan      MAC     ASA 3       Induction: intravenous. Anesthetic plan and risks discussed with patient. Pre Anesthesia Evaluation complete. Anesthesia plan, risks, benefits, alternatives, and personnel discussed with patient and/or legal guardian. Patient and/or legal guardian verbalized an understanding and agreed to proceed. Anesthesia plan discussed with care team members and agreed upon.   GAUDENCIO Wayne - CRNA  6/28/2019

## 2019-06-28 ENCOUNTER — ANESTHESIA (OUTPATIENT)
Dept: OPERATING ROOM | Age: 84
End: 2019-06-28
Payer: MEDICARE

## 2019-06-28 ENCOUNTER — HOSPITAL ENCOUNTER (OUTPATIENT)
Age: 84
Setting detail: OUTPATIENT SURGERY
Discharge: HOME | End: 2019-06-28
Attending: OPHTHALMOLOGY | Admitting: OPHTHALMOLOGY
Payer: MEDICARE

## 2019-06-28 VITALS
OXYGEN SATURATION: 100 % | TEMPERATURE: 98.6 F | SYSTOLIC BLOOD PRESSURE: 108 MMHG | DIASTOLIC BLOOD PRESSURE: 52 MMHG | RESPIRATION RATE: 19 BRPM

## 2019-06-28 VITALS
RESPIRATION RATE: 16 BRPM | TEMPERATURE: 97.4 F | DIASTOLIC BLOOD PRESSURE: 63 MMHG | OXYGEN SATURATION: 98 % | SYSTOLIC BLOOD PRESSURE: 125 MMHG | HEART RATE: 55 BPM

## 2019-06-28 PROBLEM — H25.12 AGE-RELATED NUCLEAR CATARACT OF LEFT EYE: Status: ACTIVE | Noted: 2019-06-28

## 2019-06-28 PROCEDURE — 2709999900 HC NON-CHARGEABLE SUPPLY: Performed by: OPHTHALMOLOGY

## 2019-06-28 PROCEDURE — 3600000014 HC SURGERY LEVEL 4 ADDTL 15MIN: Performed by: OPHTHALMOLOGY

## 2019-06-28 PROCEDURE — 7100000010 HC PHASE II RECOVERY - FIRST 15 MIN: Performed by: OPHTHALMOLOGY

## 2019-06-28 PROCEDURE — 6370000000 HC RX 637 (ALT 250 FOR IP): Performed by: OPHTHALMOLOGY

## 2019-06-28 PROCEDURE — 2580000003 HC RX 258: Performed by: OPHTHALMOLOGY

## 2019-06-28 PROCEDURE — 3600000004 HC SURGERY LEVEL 4 BASE: Performed by: OPHTHALMOLOGY

## 2019-06-28 PROCEDURE — 2500000003 HC RX 250 WO HCPCS: Performed by: OPHTHALMOLOGY

## 2019-06-28 PROCEDURE — 7100000011 HC PHASE II RECOVERY - ADDTL 15 MIN: Performed by: OPHTHALMOLOGY

## 2019-06-28 PROCEDURE — V2632 POST CHMBR INTRAOCULAR LENS: HCPCS | Performed by: OPHTHALMOLOGY

## 2019-06-28 PROCEDURE — 3700000001 HC ADD 15 MINUTES (ANESTHESIA): Performed by: OPHTHALMOLOGY

## 2019-06-28 PROCEDURE — 6360000002 HC RX W HCPCS: Performed by: NURSE ANESTHETIST, CERTIFIED REGISTERED

## 2019-06-28 PROCEDURE — 3700000000 HC ANESTHESIA ATTENDED CARE: Performed by: OPHTHALMOLOGY

## 2019-06-28 PROCEDURE — 6360000002 HC RX W HCPCS: Performed by: OPHTHALMOLOGY

## 2019-06-28 DEVICE — AKREOS AO MICRO INCISION LENS +0.00D
Type: IMPLANTABLE DEVICE | Site: EYE | Status: FUNCTIONAL
Brand: AKREOS® AO IOL

## 2019-06-28 RX ORDER — TETRACAINE HYDROCHLORIDE 5 MG/ML
SOLUTION OPHTHALMIC
Status: COMPLETED | OUTPATIENT
Start: 2019-06-28 | End: 2019-06-28

## 2019-06-28 RX ORDER — SODIUM CHLORIDE, SODIUM LACTATE, POTASSIUM CHLORIDE, CALCIUM CHLORIDE 600; 310; 30; 20 MG/100ML; MG/100ML; MG/100ML; MG/100ML
INJECTION, SOLUTION INTRAVENOUS CONTINUOUS
Status: DISCONTINUED | OUTPATIENT
Start: 2019-06-28 | End: 2019-06-28 | Stop reason: HOSPADM

## 2019-06-28 RX ORDER — LIDOCAINE HYDROCHLORIDE 20 MG/ML
INJECTION, SOLUTION EPIDURAL; INFILTRATION; INTRACAUDAL; PERINEURAL
Status: COMPLETED | OUTPATIENT
Start: 2019-06-28 | End: 2019-06-28

## 2019-06-28 RX ORDER — LIDOCAINE HYDROCHLORIDE 20 MG/ML
INJECTION, SOLUTION INTRAVENOUS PRN
Status: DISCONTINUED | OUTPATIENT
Start: 2019-06-28 | End: 2019-06-28 | Stop reason: SDUPTHER

## 2019-06-28 RX ORDER — CEFAZOLIN SODIUM 1 G/3ML
INJECTION, POWDER, FOR SOLUTION INTRAMUSCULAR; INTRAVENOUS
Status: COMPLETED | OUTPATIENT
Start: 2019-06-28 | End: 2019-06-28

## 2019-06-28 RX ORDER — BETAMETHASONE SODIUM PHOSPHATE AND BETAMETHASONE ACETATE 3; 3 MG/ML; MG/ML
INJECTION, SUSPENSION INTRA-ARTICULAR; INTRALESIONAL; INTRAMUSCULAR; SOFT TISSUE
Status: COMPLETED | OUTPATIENT
Start: 2019-06-28 | End: 2019-06-28

## 2019-06-28 RX ORDER — PHENYLEPHRINE HCL 2.5 %
1 DROPS OPHTHALMIC (EYE) SEE ADMIN INSTRUCTIONS
Status: DISCONTINUED | OUTPATIENT
Start: 2019-06-28 | End: 2019-06-28 | Stop reason: HOSPADM

## 2019-06-28 RX ORDER — CYCLOPENTOLATE HYDROCHLORIDE 10 MG/ML
1 SOLUTION/ DROPS OPHTHALMIC SEE ADMIN INSTRUCTIONS
Status: DISCONTINUED | OUTPATIENT
Start: 2019-06-28 | End: 2019-06-28 | Stop reason: HOSPADM

## 2019-06-28 RX ORDER — NEOMYCIN SULFATE, POLYMYXIN B SULFATE, AND DEXAMETHASONE 3.5; 10000; 1 MG/G; [USP'U]/G; MG/G
OINTMENT OPHTHALMIC
Status: COMPLETED | OUTPATIENT
Start: 2019-06-28 | End: 2019-06-28

## 2019-06-28 RX ORDER — PROPOFOL 10 MG/ML
INJECTION, EMULSION INTRAVENOUS PRN
Status: DISCONTINUED | OUTPATIENT
Start: 2019-06-28 | End: 2019-06-28 | Stop reason: SDUPTHER

## 2019-06-28 RX ORDER — SODIUM CHLORIDE 0.9 % (FLUSH) 0.9 %
10 SYRINGE (ML) INJECTION ONCE
Status: COMPLETED | OUTPATIENT
Start: 2019-06-28 | End: 2019-06-28

## 2019-06-28 RX ORDER — TROPICAMIDE 10 MG/ML
1 SOLUTION/ DROPS OPHTHALMIC SEE ADMIN INSTRUCTIONS
Status: DISCONTINUED | OUTPATIENT
Start: 2019-06-28 | End: 2019-06-28 | Stop reason: HOSPADM

## 2019-06-28 RX ADMIN — PHENYLEPHRINE HYDROCHLORIDE 1 DROP: 25 SOLUTION/ DROPS OPHTHALMIC at 07:01

## 2019-06-28 RX ADMIN — TROPICAMIDE 1 DROP: 10 SOLUTION/ DROPS OPHTHALMIC at 07:01

## 2019-06-28 RX ADMIN — TROPICAMIDE 1 DROP: 10 SOLUTION/ DROPS OPHTHALMIC at 07:18

## 2019-06-28 RX ADMIN — PHENYLEPHRINE HYDROCHLORIDE 1 DROP: 25 SOLUTION/ DROPS OPHTHALMIC at 07:18

## 2019-06-28 RX ADMIN — TROPICAMIDE 1 DROP: 10 SOLUTION/ DROPS OPHTHALMIC at 07:38

## 2019-06-28 RX ADMIN — SODIUM CHLORIDE, POTASSIUM CHLORIDE, SODIUM LACTATE AND CALCIUM CHLORIDE: 600; 310; 30; 20 INJECTION, SOLUTION INTRAVENOUS at 07:20

## 2019-06-28 RX ADMIN — PHENYLEPHRINE HYDROCHLORIDE 1 DROP: 25 SOLUTION/ DROPS OPHTHALMIC at 07:38

## 2019-06-28 RX ADMIN — CYCLOPENTOLATE HYDROCHLORIDE 1 DROP: 10 SOLUTION/ DROPS OPHTHALMIC at 07:01

## 2019-06-28 RX ADMIN — LIDOCAINE HYDROCHLORIDE 100 MG: 20 INJECTION, SOLUTION INTRAVENOUS at 08:13

## 2019-06-28 RX ADMIN — CYCLOPENTOLATE HYDROCHLORIDE 1 DROP: 10 SOLUTION/ DROPS OPHTHALMIC at 07:38

## 2019-06-28 RX ADMIN — CYCLOPENTOLATE HYDROCHLORIDE 1 DROP: 10 SOLUTION/ DROPS OPHTHALMIC at 07:18

## 2019-06-28 RX ADMIN — SODIUM CHLORIDE, POTASSIUM CHLORIDE, SODIUM LACTATE AND CALCIUM CHLORIDE: 600; 310; 30; 20 INJECTION, SOLUTION INTRAVENOUS at 08:05

## 2019-06-28 RX ADMIN — PROPOFOL 60 MG: 10 INJECTION, EMULSION INTRAVENOUS at 08:13

## 2019-06-28 RX ADMIN — Medication 10 ML: at 07:20

## 2019-06-28 ASSESSMENT — PAIN SCALES - GENERAL
PAINLEVEL_OUTOF10: 0
PAINLEVEL_OUTOF10: 0

## 2019-06-28 ASSESSMENT — PULMONARY FUNCTION TESTS
PIF_VALUE: 1
PIF_VALUE: 2
PIF_VALUE: 1
PIF_VALUE: 2
PIF_VALUE: 1

## 2019-06-28 NOTE — ANESTHESIA POSTPROCEDURE EVALUATION
Department of Anesthesiology  Postprocedure Note    Patient: Romana Casas  MRN: [id number]  YOB: 1935  Date of evaluation: 6/28/2019  Time:  8:34 AM     Procedure Summary     Date:  06/28/19 Room / Location:  [place] [address] / [facility] OR    Anesthesia Start:  0805 Anesthesia Stop:  0465    Procedure:  EYE CATARACT EMULSIFICATION IOL IMPLANT (Left Eye) Diagnosis:  (CATARACT LEFT EYE)    Surgeon:  Johny Blum MD Responsible Provider:  GAUDENCIO Kim CRNA    Anesthesia Type:  MAC ASA Status:  3          Anesthesia Type: MAC    Celestino Phase I: Celestino Score: 10    Celestino Phase II:  10    Last vitals: Reviewed and per EMR flowsheets.        Anesthesia Post Evaluation    Patient location during evaluation: PACU  Patient participation: complete - patient participated  Level of consciousness: awake and alert  Pain score: 0  Airway patency: patent  Nausea & Vomiting: no nausea and no vomiting  Complications: no  Cardiovascular status: hemodynamically stable and blood pressure returned to baseline  Respiratory status: room air, spontaneous ventilation, acceptable and nonlabored ventilation  Hydration status: stable

## 2019-06-28 NOTE — OP NOTE
ACCOUNT NUMBER:  PATIENT NAME: Adrianne Valenzuela  SURGEON:Angel Rollins [address]  [place] [address]    OPERATIVE REPORT          DATE OF OPERATION: _6/28/2019_  PREOPERTIVE DIAGNOIS:  CataractOS eye. POSTOPERTIVE DIAGNOSIS:  Cataract_OS eye. PROCEDURE:  Phacoemulsification of Cataract with intraocular lens      implant _OS eye. ANESTHESIA:  Monitored anesthesia care with 2% Xylocaine in a       retrobullar injection. DESCRIPTION OF PROCEDURE:  Under adequate local anesthesia, a lid speculum was placed in the eye to expose the globe. Conjunctival flap was developed from 10 to 12 oclock position. Hemostasis was obtained using wet-field cautery. A 69-Bever blade was than utilized to make the initial scleral incision to a mid depth. Using a 55/22 blade a shelved incision was made into the anterior chamber. Paracentesis site was made at the 2 oclock position with a 75-Bever blade. Healon was instilled and anterior capsuiorrhexis was performed. Hydrodilssection with BSS was carried out. Utilizing the phaco-emulsifier, the phacoemulsification of the cataract was accomplished. Using the irrigation-aspiration unit the remaining cortical material was removed. The intraocular lens was inspected and inserted into the bag with no difficulty after healon was instilled in the anterior chamber. The healon was removed with irrigation-aspiration unit and the wound was inspected to be self-sealing. The conjunctiva flap was then reapproximated with cautery. Sub-Tenon injection of 0.5 ml of Celestone and 0.5 ml Ancef was given. NeoDecadron was placed in the cul-de-sac. The patients eye was patched and shielded. The patient was returned to recovery in satisfactory condition with no complications from surgery or anesthesia.     The patient was discharged on the same day with home-going instructions and no bending, lifting, or straining, The patient is to keep the patch on at all times and follow up in my

## 2019-07-03 ENCOUNTER — HOSPITAL ENCOUNTER (OUTPATIENT)
Age: 84
Setting detail: SPECIMEN
Discharge: HOME | End: 2019-07-03
Payer: MEDICARE

## 2019-07-03 LAB
INR BLD: 1.14 INDEX
PROTHROMBIN TIME: 13 SECONDS (ref 9.12–12.5)

## 2019-07-03 PROCEDURE — 36415 COLL VENOUS BLD VENIPUNCTURE: CPT

## 2019-07-03 PROCEDURE — 85610 PROTHROMBIN TIME: CPT

## 2019-07-11 ENCOUNTER — HOSPITAL ENCOUNTER (EMERGENCY)
Age: 84
Discharge: HOME | End: 2019-07-11
Attending: EMERGENCY MEDICINE
Payer: MEDICARE

## 2019-07-11 VITALS
DIASTOLIC BLOOD PRESSURE: 84 MMHG | HEIGHT: 63 IN | HEART RATE: 65 BPM | WEIGHT: 156 LBS | OXYGEN SATURATION: 96 % | BODY MASS INDEX: 27.64 KG/M2 | TEMPERATURE: 97.8 F | SYSTOLIC BLOOD PRESSURE: 132 MMHG | RESPIRATION RATE: 16 BRPM

## 2019-07-11 DIAGNOSIS — S41.112A SKIN TEAR OF LEFT UPPER EXTREMITY: ICD-10-CM

## 2019-07-11 DIAGNOSIS — T14.8XXA SKIN AVULSION: ICD-10-CM
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
## 2019-07-11 PROCEDURE — 99282 EMERGENCY DEPT VISIT SF MDM: CPT

## 2019-07-11 PROCEDURE — 6370000000 HC RX 637 (ALT 250 FOR IP): Performed by: EMERGENCY MEDICINE

## 2019-07-11 PROCEDURE — 2500000003 HC RX 250 WO HCPCS: Performed by: EMERGENCY MEDICINE

## 2019-07-11 PROCEDURE — 6370000000 HC RX 637 (ALT 250 FOR IP)

## 2019-07-11 PROCEDURE — 4500000027

## 2019-07-11 RX ORDER — AMOXICILLIN AND CLAVULANATE POTASSIUM 875; 125 MG/1; MG/1
1 TABLET, FILM COATED ORAL ONCE
Status: COMPLETED | OUTPATIENT
Start: 2019-07-11 | End: 2019-07-11

## 2019-07-11 RX ORDER — AMOXICILLIN AND CLAVULANATE POTASSIUM 875; 125 MG/1; MG/1
1 TABLET, FILM COATED ORAL 2 TIMES DAILY
Qty: 20 TABLET | Refills: 0 | Status: SHIPPED | OUTPATIENT
Start: 2019-07-11 | End: 2019-07-21

## 2019-07-11 RX ORDER — GINSENG 100 MG
CAPSULE ORAL
Status: COMPLETED
Start: 2019-07-11 | End: 2019-07-11

## 2019-07-11 RX ORDER — LIDOCAINE HYDROCHLORIDE AND EPINEPHRINE BITARTRATE 20; .01 MG/ML; MG/ML
20 INJECTION, SOLUTION SUBCUTANEOUS ONCE
Status: COMPLETED | OUTPATIENT
Start: 2019-07-11 | End: 2019-07-11

## 2019-07-11 RX ADMIN — Medication 3 ML: at 11:29

## 2019-07-11 RX ADMIN — BACITRACIN: 500 OINTMENT TOPICAL at 12:32

## 2019-07-11 RX ADMIN — AMOXICILLIN AND CLAVULANATE POTASSIUM 1 TABLET: 875; 125 TABLET, FILM COATED ORAL at 11:30

## 2019-07-11 RX ADMIN — LIDOCAINE HYDROCHLORIDE,EPINEPHRINE BITARTRATE 20 ML: 20; .01 INJECTION, SOLUTION INFILTRATION; PERINEURAL at 11:30

## 2019-07-11 ASSESSMENT — PAIN SCALES - GENERAL
PAINLEVEL_OUTOF10: 2
PAINLEVEL_OUTOF10: 2

## 2019-07-11 ASSESSMENT — PAIN DESCRIPTION - FREQUENCY: FREQUENCY: CONTINUOUS

## 2019-07-11 ASSESSMENT — PAIN DESCRIPTION - DESCRIPTORS: DESCRIPTORS: BURNING

## 2019-07-11 ASSESSMENT — PAIN DESCRIPTION - ORIENTATION: ORIENTATION: RIGHT

## 2019-07-11 ASSESSMENT — PAIN DESCRIPTION - PROGRESSION: CLINICAL_PROGRESSION: GRADUALLY IMPROVING

## 2019-07-11 ASSESSMENT — PAIN DESCRIPTION - ONSET: ONSET: ON-GOING

## 2019-07-11 ASSESSMENT — PAIN DESCRIPTION - LOCATION: LOCATION: ARM

## 2019-07-11 ASSESSMENT — PAIN DESCRIPTION - PAIN TYPE: TYPE: ACUTE PAIN

## 2019-07-11 NOTE — ED PROVIDER NOTES
Triage Chief Complaint:   Laceration (large skin tears on right forearm from cat scratches, cat immunized, states was accident)      [place]:  Zurdo Hyde is a 82 y.o. female that presents to the emergency department with a few scratches to her right forearm from her cat. States the cat was laying next to her and got spooked by a noise and its claws scratched her skin. She does have one deep skin tear into subcutaneous tissue the others are superficial.  She is up-to-date on her tetanus chart. States mild pain burning in nature to this area. Did not fall did not hit her head. No bony pain. .    Past Medical History:   Diagnosis Date    Arthritis     Diverticulitis     Easy bruising 5/10/2016    H/O cardiovascular stress test 4/23/16    Normal stress test. EF 88%    H/O echocardiogram 4/23/16    Aortic sclerosis with mild stenosis. Mild concentric LVH with preserved systolic function. Mild MR and TR EF 50-55%     Heart murmur 6/26/2018    Mild aortic stenosis.  History of Holter monitoring 6/9/2016    Normal Holter findings suggestive of normal sinus rhythm w/rare complex supraventricular ectopy without any clinically significant arrhythmias.     Hyperlipidemia     Hypertension     Leg cramps     PAF (paroxysmal atrial fibrillation) ([address] 5/10/2016    Controled on flecainide     Thyroid disease      Past Surgical History:   Procedure Laterality Date    ABDOMEN SURGERY      colon surgery, had diverticulitis, had colon resection    CATARACT REMOVAL WITH IMPLANT Right 06/14/2019    by Dr. Judge Guo Left 06/28/2019    by Dr. Isha Ibarra  06/2018    INTRACAPSULAR CATARACT EXTRACTION Right 6/14/2019    EYE CATARACT EMULSIFICATION IOL IMPLANT performed by Chip Buckner MD at [address] [place] [address] Left 6/28/2019    EYE CATARACT EMULSIFICATION IOL IMPLANT performed by Chip Buckner MD at [facility] by mouth Daily      donepezil (ARICEPT) 10 MG tablet Take 10 mg by mouth nightly      baclofen (LIORESAL) 10 MG tablet Take 10 mg by mouth 2 times daily      flecainide (TAMBOCOR) 50 MG tablet Take 1 tablet by mouth 2 times daily 180 tablet 3    furosemide (LASIX) 40 MG tablet 40 mg daily       omeprazole (PRILOSEC) 40 MG capsule Take 40 mg by mouth daily      warfarin (COUMADIN) 5 MG tablet Take 1 tablet by mouth daily (Patient taking differently: Take 2.5 mg by mouth daily ) 30 tablet 3     No Known Allergies  Nursing Notes Reviewed    ROS:  At least 10 systems reviewed and otherwise negative except as in the [place]. Physical Exam:  ED Triage Vitals [07/11/19 1116]   Enc Vitals Group      /84      Pulse 65      Resp 16      Temp 97.8 °F (36.6 °C)      Temp Source Oral      SpO2 96 %      Weight 156 lb (70.8 kg)      Height 5' 3\" (1.6 m)      Head Circumference       Peak Flow       Pain Score       Pain Loc       Pain Edu? Excl. in [address]? My pulse oximetry interpretation is which is within the normal range    GENERAL APPEARANCE: Awake and alert. Cooperative. No acute distress. HEAD: Normocephalic. Atraumatic. EYES: EOM's grossly intact. Sclera anicteric. ENT: Mucous membranes are moist. Tolerates saliva. No trismus. NECK: Supple. No meningismus. Trachea midline. HEART: RRR. Radial pulses 2+. LUNGS: Respirations unlabored. CTAB  ABDOMEN: Soft. Non-tender. No guarding or rebound. EXTREMITIES: No acute deformities. SKIN: Warm and dry. Right forearm with several skin tears secondary to cat scratch. She does have mid lateral forearm with approximately 1-1/2 cm superficial skin tear. There is a V-Y flap approximately 5 cm in length total that does exposed subcutaneous tissue. No active bleeding. No tendons visualized. NEUROLOGICAL: No gross facial drooping. Moves all 4 extremities spontaneously. PSYCHIATRIC: Normal mood.     I have reviewed and interpreted all of the currently available lab results from this visit (if applicable):  No results found for this visit on 07/11/19. Radiographs:  [] Radiologist's Wet Read Report Reviewed:     [] Discussed with Radiologist:     [] The following radiograph was interpreted by myself in the absence of a radiologist:     EKG: (All EKG's are interpreted by myself in the absence of a cardiologist)    The risks and benefits of laceration repair were discussed with the patient. Questions were sought and answered and verbal consent was given for the procedure. The area was prepped and draped in standard bedside fashion. The wound area was anesthetized with 2ml of Lidocaine 2% with epinephrine without added sodium bicarbonate. The wound was explored with No foreign bodies found. The wound was repaired with 5-0 Prolene; 5 simple interrupted  sutures were used. The patient tolerated the procedure well without complications and my repeat neurovascular exam post-procedure is unchanged. Wound care and scar minimization education was provided. Instructions were given to return for increasing pain, redness, streaking, discharge, or any other worsening or worrisome concerns. MDM:  Vital signs are stable. Will place let gel and then to cleanse the wound and place sutures. Did give Augmentin in the emergency department. Did place a few loose approximating sutures through her thin skin which did allwo for better approximation, will place steri strips and dress. Will place on Augmentin. Follow-up PCP and return to ED in 7 to 10 days for wound recheck and suture removal.    Clinical Impression:  1. Cat scratch    2. Skin avulsion    3.  Skin tear of left upper extremity        Disposition Vitals:  [unfilled], [unfilled], [unfilled], [unfilled]    Disposition referral (if applicable):  [facility]  [phone]    In 1 week  For suture removal    [facility] Emergency Department  [address] [facility] [address] [address]  [phone]  In 10 days  For suture removal      Disposition medications (if applicable):  New Prescriptions    No medications on file         (Please note that portions of this note may have been completed with a voice recognition program. Efforts were made to edit the dictations but occasionally words are mis-transcribed.)    MD Lilly Mccloud MD  07/11/19 0641

## 2019-07-12 ENCOUNTER — HOSPITAL ENCOUNTER (OUTPATIENT)
Age: 84
Setting detail: SPECIMEN
Discharge: HOME | End: 2019-07-12
Payer: MEDICARE

## 2019-07-12 LAB
INR BLD: 1.72 INDEX
PROTHROMBIN TIME: 19.9 SECONDS (ref 9.12–12.5)

## 2019-07-12 PROCEDURE — 36415 COLL VENOUS BLD VENIPUNCTURE: CPT

## 2019-07-12 PROCEDURE — 85610 PROTHROMBIN TIME: CPT

## 2019-07-19 ENCOUNTER — HOSPITAL ENCOUNTER (OUTPATIENT)
Age: 84
Setting detail: SPECIMEN
Discharge: HOME | End: 2019-07-19
Payer: MEDICARE

## 2019-07-19 LAB
INR BLD: 1.77 INDEX
PROTHROMBIN TIME: 20.1 SECONDS (ref 9.12–12.5)

## 2019-07-19 PROCEDURE — 36415 COLL VENOUS BLD VENIPUNCTURE: CPT

## 2019-07-19 PROCEDURE — 85610 PROTHROMBIN TIME: CPT

## 2019-07-26 ENCOUNTER — HOSPITAL ENCOUNTER (OUTPATIENT)
Age: 84
Setting detail: SPECIMEN
Discharge: HOME | End: 2019-07-26
Payer: MEDICARE

## 2019-07-26 LAB
INR BLD: 2.21 INDEX
PROTHROMBIN TIME: 25.4 SECONDS (ref 9.12–12.5)

## 2019-07-26 PROCEDURE — 85610 PROTHROMBIN TIME: CPT

## 2019-07-26 PROCEDURE — 36415 COLL VENOUS BLD VENIPUNCTURE: CPT

## 2019-08-02 ENCOUNTER — HOSPITAL ENCOUNTER (OUTPATIENT)
Age: 84
Setting detail: SPECIMEN
Discharge: HOME | End: 2019-08-02
Payer: MEDICARE

## 2019-08-02 LAB
INR BLD: 1.58 INDEX
PROTHROMBIN TIME: 18.2 SECONDS (ref 9.12–12.5)

## 2019-08-02 PROCEDURE — 36415 COLL VENOUS BLD VENIPUNCTURE: CPT

## 2019-08-02 PROCEDURE — 85610 PROTHROMBIN TIME: CPT

## 2019-08-09 ENCOUNTER — HOSPITAL ENCOUNTER (OUTPATIENT)
Age: 84
Setting detail: SPECIMEN
Discharge: HOME | End: 2019-08-09
Payer: MEDICARE

## 2019-08-09 LAB
INR BLD: 2.38 INDEX
PROTHROMBIN TIME: 27.4 SECONDS (ref 9.12–12.5)

## 2019-08-09 PROCEDURE — 85610 PROTHROMBIN TIME: CPT

## 2019-08-09 PROCEDURE — 36415 COLL VENOUS BLD VENIPUNCTURE: CPT

## 2019-08-16 ENCOUNTER — HOSPITAL ENCOUNTER (OUTPATIENT)
Age: 84
Discharge: HOME | End: 2019-08-16
Payer: MEDICARE

## 2019-08-23 ENCOUNTER — HOSPITAL ENCOUNTER (OUTPATIENT)
Age: 84
Setting detail: SPECIMEN
Discharge: HOME | End: 2019-08-23
Payer: MEDICARE

## 2019-08-23 LAB
INR BLD: 3.59 INDEX
PROTHROMBIN TIME: 41.1 SECONDS (ref 9.12–12.5)

## 2019-08-23 PROCEDURE — 85610 PROTHROMBIN TIME: CPT

## 2019-08-23 PROCEDURE — 36415 COLL VENOUS BLD VENIPUNCTURE: CPT

## 2019-08-30 ENCOUNTER — HOSPITAL ENCOUNTER (OUTPATIENT)
Age: 84
Setting detail: SPECIMEN
Discharge: HOME | End: 2019-08-30
Payer: MEDICARE

## 2019-08-30 LAB
INR BLD: 2.71 INDEX
PROTHROMBIN TIME: 31.1 SECONDS (ref 9.12–12.5)

## 2019-08-30 PROCEDURE — 36415 COLL VENOUS BLD VENIPUNCTURE: CPT

## 2019-08-30 PROCEDURE — 85610 PROTHROMBIN TIME: CPT

## 2019-09-06 ENCOUNTER — HOSPITAL ENCOUNTER (OUTPATIENT)
Age: 84
Setting detail: SPECIMEN
Discharge: HOME | End: 2019-09-06
Payer: MEDICARE

## 2019-09-06 LAB
INR BLD: 2.65 INDEX
PROTHROMBIN TIME: 31.1 SECONDS (ref 9.12–12.5)

## 2019-09-06 PROCEDURE — 85610 PROTHROMBIN TIME: CPT

## 2019-09-06 PROCEDURE — 36415 COLL VENOUS BLD VENIPUNCTURE: CPT

## 2019-09-13 ENCOUNTER — HOSPITAL ENCOUNTER (OUTPATIENT)
Age: 84
Setting detail: SPECIMEN
Discharge: HOME | End: 2019-09-13
Payer: MEDICARE

## 2019-09-13 LAB
INR BLD: 2.92 INDEX
PROTHROMBIN TIME: 33.6 SECONDS (ref 9.12–12.5)

## 2019-09-13 PROCEDURE — 36415 COLL VENOUS BLD VENIPUNCTURE: CPT

## 2019-09-13 PROCEDURE — 85610 PROTHROMBIN TIME: CPT

## 2019-09-20 ENCOUNTER — HOSPITAL ENCOUNTER (OUTPATIENT)
Age: 84
Setting detail: SPECIMEN
Discharge: HOME | End: 2019-09-20
Payer: MEDICARE

## 2019-09-20 LAB
INR BLD: 2.85 INDEX
PROTHROMBIN TIME: 33.4 SECONDS (ref 9.12–12.5)

## 2019-09-20 PROCEDURE — 85610 PROTHROMBIN TIME: CPT

## 2019-09-20 PROCEDURE — 36415 COLL VENOUS BLD VENIPUNCTURE: CPT

## 2019-09-27 ENCOUNTER — HOSPITAL ENCOUNTER (OUTPATIENT)
Age: 84
Setting detail: SPECIMEN
Discharge: HOME | End: 2019-09-27
Payer: MEDICARE

## 2019-09-27 LAB
INR BLD: 2.38 INDEX
PROTHROMBIN TIME: 27.8 SECONDS (ref 9.12–12.5)

## 2019-09-27 PROCEDURE — 85610 PROTHROMBIN TIME: CPT

## 2019-09-27 PROCEDURE — 36415 COLL VENOUS BLD VENIPUNCTURE: CPT

## 2019-10-04 ENCOUNTER — HOSPITAL ENCOUNTER (OUTPATIENT)
Age: 84
Setting detail: SPECIMEN
Discharge: HOME | End: 2019-10-04
Payer: MEDICARE

## 2019-10-04 LAB
INR BLD: 2.1 INDEX
PROTHROMBIN TIME: 24.5 SECONDS (ref 9.12–12.5)

## 2019-10-04 PROCEDURE — 85610 PROTHROMBIN TIME: CPT

## 2019-10-04 PROCEDURE — 36415 COLL VENOUS BLD VENIPUNCTURE: CPT

## 2019-10-11 ENCOUNTER — HOSPITAL ENCOUNTER (OUTPATIENT)
Age: 84
Setting detail: SPECIMEN
Discharge: HOME | End: 2019-10-11
Payer: MEDICARE

## 2019-10-11 LAB
INR BLD: 2.13 INDEX
PROTHROMBIN TIME: 24.5 SECONDS (ref 9.12–12.5)

## 2019-10-11 PROCEDURE — 85610 PROTHROMBIN TIME: CPT

## 2019-10-11 PROCEDURE — 36415 COLL VENOUS BLD VENIPUNCTURE: CPT

## 2019-10-18 ENCOUNTER — HOSPITAL ENCOUNTER (OUTPATIENT)
Age: 84
Setting detail: SPECIMEN
Discharge: HOME | End: 2019-10-18
Payer: MEDICARE

## 2019-10-18 LAB
INR BLD: 2.6 INDEX
PROTHROMBIN TIME: 29.9 SECONDS (ref 11.7–14.5)

## 2019-10-18 PROCEDURE — 85610 PROTHROMBIN TIME: CPT

## 2019-10-18 PROCEDURE — 36415 COLL VENOUS BLD VENIPUNCTURE: CPT

## 2019-10-25 ENCOUNTER — HOSPITAL ENCOUNTER (OUTPATIENT)
Age: 84
Setting detail: SPECIMEN
Discharge: HOME | End: 2019-10-25
Payer: MEDICARE

## 2019-10-25 LAB
INR BLD: 3.17 INDEX
PROTHROMBIN TIME: 36.6 SECONDS (ref 11.7–14.5)

## 2019-10-25 PROCEDURE — 36415 COLL VENOUS BLD VENIPUNCTURE: CPT

## 2019-10-25 PROCEDURE — 85610 PROTHROMBIN TIME: CPT

## 2019-11-01 ENCOUNTER — HOSPITAL ENCOUNTER (OUTPATIENT)
Age: 84
Setting detail: SPECIMEN
Discharge: HOME | End: 2019-11-01
Payer: MEDICARE

## 2019-11-01 LAB
INR BLD: 1.86 INDEX
PROTHROMBIN TIME: 21.3 SECONDS (ref 11.7–14.5)

## 2019-11-01 PROCEDURE — 36415 COLL VENOUS BLD VENIPUNCTURE: CPT

## 2019-11-01 PROCEDURE — 85610 PROTHROMBIN TIME: CPT

## 2019-11-06 ENCOUNTER — HOSPITAL ENCOUNTER (OUTPATIENT)
Age: 84
Discharge: HOME | End: 2019-11-06
Payer: MEDICARE

## 2019-11-06 LAB
INR BLD: 2.92 INDEX
PROTHROMBIN TIME: 33.7 SECONDS (ref 11.7–14.5)

## 2019-11-06 PROCEDURE — 85610 PROTHROMBIN TIME: CPT

## 2019-11-06 PROCEDURE — 36415 COLL VENOUS BLD VENIPUNCTURE: CPT

## 2019-11-14 ENCOUNTER — HOSPITAL ENCOUNTER (OUTPATIENT)
Age: 84
Setting detail: SPECIMEN
Discharge: HOME | End: 2019-11-14
Payer: MEDICARE

## 2019-11-14 LAB
INR BLD: 2.48 INDEX
PROTHROMBIN TIME: 28.5 SECONDS (ref 11.7–14.5)

## 2019-11-14 PROCEDURE — 85610 PROTHROMBIN TIME: CPT

## 2019-11-14 PROCEDURE — 36415 COLL VENOUS BLD VENIPUNCTURE: CPT

## 2019-11-21 ENCOUNTER — HOSPITAL ENCOUNTER (OUTPATIENT)
Age: 84
Setting detail: SPECIMEN
Discharge: HOME | End: 2019-11-21
Payer: MEDICARE

## 2019-11-21 LAB
INR BLD: 5.06 INDEX
PROTHROMBIN TIME: 58.6 SECONDS (ref 11.7–14.5)

## 2019-11-21 PROCEDURE — 85610 PROTHROMBIN TIME: CPT

## 2019-11-21 PROCEDURE — 36415 COLL VENOUS BLD VENIPUNCTURE: CPT

## 2019-11-25 ENCOUNTER — HOSPITAL ENCOUNTER (OUTPATIENT)
Age: 84
Discharge: HOME | End: 2019-11-25
Payer: MEDICARE

## 2019-11-25 LAB
INR BLD: 3.45 INDEX
PROTHROMBIN TIME: 40.5 SECONDS (ref 11.7–14.5)

## 2019-11-25 PROCEDURE — 85610 PROTHROMBIN TIME: CPT

## 2019-11-25 PROCEDURE — 36415 COLL VENOUS BLD VENIPUNCTURE: CPT

## 2019-11-27 ENCOUNTER — HOSPITAL ENCOUNTER (OUTPATIENT)
Age: 84
Setting detail: SPECIMEN
Discharge: HOME | End: 2019-11-27
Payer: MEDICARE

## 2019-11-27 LAB
INR BLD: 2.75 INDEX
PROTHROMBIN TIME: 31.7 SECONDS (ref 11.7–14.5)

## 2019-11-27 PROCEDURE — 36415 COLL VENOUS BLD VENIPUNCTURE: CPT

## 2019-11-27 PROCEDURE — 85610 PROTHROMBIN TIME: CPT

## 2019-12-05 ENCOUNTER — HOSPITAL ENCOUNTER (OUTPATIENT)
Age: 84
Setting detail: SPECIMEN
Discharge: HOME | End: 2019-12-05
Payer: MEDICARE

## 2019-12-05 LAB
INR BLD: 1.37 INDEX
PROTHROMBIN TIME: 16.6 SECONDS (ref 11.7–14.5)

## 2019-12-05 PROCEDURE — 85610 PROTHROMBIN TIME: CPT

## 2019-12-05 PROCEDURE — 36415 COLL VENOUS BLD VENIPUNCTURE: CPT

## 2019-12-12 ENCOUNTER — HOSPITAL ENCOUNTER (OUTPATIENT)
Age: 84
Setting detail: SPECIMEN
Discharge: HOME | End: 2019-12-12
Payer: MEDICARE

## 2019-12-12 LAB
INR BLD: 2.56 INDEX
PROTHROMBIN TIME: 31.3 SECONDS (ref 11.7–14.5)

## 2019-12-12 PROCEDURE — 85610 PROTHROMBIN TIME: CPT

## 2019-12-12 PROCEDURE — 36415 COLL VENOUS BLD VENIPUNCTURE: CPT

## 2019-12-19 ENCOUNTER — HOSPITAL ENCOUNTER (OUTPATIENT)
Age: 84
Setting detail: SPECIMEN
Discharge: HOME | End: 2019-12-19
Payer: MEDICARE

## 2019-12-19 LAB
INR BLD: 2.09 INDEX
PROTHROMBIN TIME: 25.5 SECONDS (ref 11.7–14.5)

## 2019-12-19 PROCEDURE — 36415 COLL VENOUS BLD VENIPUNCTURE: CPT

## 2019-12-19 PROCEDURE — 85610 PROTHROMBIN TIME: CPT

## 2019-12-26 ENCOUNTER — HOSPITAL ENCOUNTER (OUTPATIENT)
Age: 84
Setting detail: SPECIMEN
Discharge: HOME | End: 2019-12-26
Payer: MEDICARE

## 2019-12-26 LAB
INR BLD: 2.01 INDEX
PROTHROMBIN TIME: 24.5 SECONDS (ref 11.7–14.5)

## 2019-12-26 PROCEDURE — 85610 PROTHROMBIN TIME: CPT

## 2019-12-26 PROCEDURE — 36415 COLL VENOUS BLD VENIPUNCTURE: CPT

## 2020-01-02 ENCOUNTER — HOSPITAL ENCOUNTER (OUTPATIENT)
Age: 85
Setting detail: SPECIMEN
Discharge: HOME | End: 2020-01-02
Payer: MEDICARE

## 2020-01-02 LAB
INR BLD: 1.98 INDEX
PROTHROMBIN TIME: 24.1 SECONDS (ref 11.7–14.5)

## 2020-01-02 PROCEDURE — 85610 PROTHROMBIN TIME: CPT

## 2020-01-02 PROCEDURE — 36415 COLL VENOUS BLD VENIPUNCTURE: CPT

## 2020-01-06 ENCOUNTER — HOSPITAL ENCOUNTER (OUTPATIENT)
Age: 85
Setting detail: SPECIMEN
Discharge: HOME | End: 2020-01-06
Payer: MEDICARE

## 2020-01-09 ENCOUNTER — HOSPITAL ENCOUNTER (OUTPATIENT)
Age: 85
Setting detail: SPECIMEN
Discharge: HOME | End: 2020-01-09
Payer: MEDICARE

## 2020-01-09 LAB
INR BLD: 1.34 INDEX
PROTHROMBIN TIME: 16.3 SECONDS (ref 11.7–14.5)

## 2020-01-09 PROCEDURE — 36415 COLL VENOUS BLD VENIPUNCTURE: CPT

## 2020-01-09 PROCEDURE — 85610 PROTHROMBIN TIME: CPT

## 2020-01-16 ENCOUNTER — HOSPITAL ENCOUNTER (OUTPATIENT)
Age: 85
Setting detail: SPECIMEN
Discharge: HOME | End: 2020-01-16
Payer: MEDICARE

## 2020-01-16 LAB
INR BLD: 2.11 INDEX
PROTHROMBIN TIME: 25.7 SECONDS (ref 11.7–14.5)

## 2020-01-16 PROCEDURE — 36415 COLL VENOUS BLD VENIPUNCTURE: CPT

## 2020-01-16 PROCEDURE — 85610 PROTHROMBIN TIME: CPT

## 2020-01-23 ENCOUNTER — HOSPITAL ENCOUNTER (OUTPATIENT)
Age: 85
Setting detail: SPECIMEN
Discharge: HOME | End: 2020-01-23
Payer: MEDICARE

## 2020-01-23 LAB
INR BLD: 2.29 INDEX
PROTHROMBIN TIME: 27.9 SECONDS (ref 11.7–14.5)

## 2020-01-23 PROCEDURE — 85610 PROTHROMBIN TIME: CPT

## 2020-01-23 PROCEDURE — 36415 COLL VENOUS BLD VENIPUNCTURE: CPT

## 2020-09-22 ENCOUNTER — OFFICE VISIT (OUTPATIENT)
Dept: CARDIOLOGY CLINIC | Age: 85
End: 2020-09-22
Payer: MEDICARE

## 2020-09-22 VITALS
SYSTOLIC BLOOD PRESSURE: 112 MMHG | DIASTOLIC BLOOD PRESSURE: 74 MMHG | WEIGHT: 142 LBS | HEIGHT: 64 IN | HEART RATE: 64 BPM | TEMPERATURE: 96.8 F | BODY MASS INDEX: 24.24 KG/M2

## 2020-09-22 PROCEDURE — 99214 OFFICE O/P EST MOD 30 MIN: CPT | Performed by: INTERNAL MEDICINE

## 2020-09-22 PROCEDURE — 93000 ELECTROCARDIOGRAM COMPLETE: CPT | Performed by: INTERNAL MEDICINE

## 2020-09-22 RX ORDER — FLECAINIDE ACETATE 50 MG/1
50 TABLET ORAL 2 TIMES DAILY
Qty: 180 TABLET | Refills: 3 | Status: SHIPPED | OUTPATIENT
Start: 2020-09-22 | End: 2021-09-20

## 2020-09-22 NOTE — PROGRESS NOTES
Ivan Villegas  1935  Luis Enrique Talley MD    Chief complaint and HPI:  Ivan Villegas  is a 82 y.o. female following up for history of paroxysmal atrial fibrillation on flecainide therapy and has hypertension and history of palpitations. She has not seen me since 2018. Since then she had left hip surgery and she has a history of bilateral knee replacement. She has been compliant to her medications and has been following up with PCP closely. She is on warfarin and PT/INR is monitored by PCP regularly. Today she denies much cardiac symptoms particularly denies any shortness of breath or palpitations or leg swelling. Denies any orthopnea or paroxysmal nocturnal dyspnea. She is very much limited in her activity due to arthritis and she walks with a walker very slowly. She has lost 14 pounds of body weight since last seen here. Her medications are reviewed. She does not smoke. Rest of the Cardiovascular system review is otherwise unchanged from prior encounter. Past medical history:  has a past medical history of Arthritis, Diverticulitis, Easy bruising, H/O cardiovascular stress test, H/O echocardiogram, Heart murmur, History of Holter monitoring, Hyperlipidemia, Hypertension, Leg cramps, PAF (paroxysmal atrial fibrillation) ([address] and Thyroid disease. Past surgical history:  has a past surgical history that includes Cholecystectomy; Thyroid surgery; Abdomen surgery; Colonoscopy; joint replacement; joint replacement; Eye surgery (06/2018); Cataract removal with implant (Right, 06/14/2019); Intracapsular cataract extraction (Right, 6/14/2019); Cataract removal with implant (Left, 06/28/2019); and Intracapsular cataract extraction (Left, 6/28/2019).   Social History:   Social History     Tobacco Use    Smoking status: Never Smoker    Smokeless tobacco: Never Used   Substance Use Topics    Alcohol use: No     Family history: family history includes Cancer in her brother, mother, and another family member. ALLERGIES:  Patient has no known allergies. Prior to Admission medications    Medication Sig Start Date End Date Taking? Authorizing Provider   flecainide (TAMBOCOR) 50 MG tablet Take 1 tablet by mouth 2 times daily 9/22/20  Yes Chloe Whitaker MD   metoprolol tartrate (LOPRESSOR) 25 MG tablet TAKE ONE TABLET BY MOUTH TWICE A DAY 9/22/20  Yes Chloe Whitaker MD   levothyroxine (SYNTHROID) 75 MCG tablet Take 75 mcg by mouth Daily   Yes Historical Provider, MD   donepezil (ARICEPT) 10 MG tablet Take 10 mg by mouth nightly   Yes Historical Provider, MD   baclofen (LIORESAL) 10 MG tablet Take 10 mg by mouth 2 times daily   Yes Historical Provider, MD   furosemide (LASIX) 40 MG tablet 40 mg daily  5/6/16  Yes Historical Provider, MD   omeprazole (PRILOSEC) 40 MG capsule Take 40 mg by mouth daily   Yes Historical Provider, MD   warfarin (COUMADIN) 5 MG tablet Take 1 tablet by mouth daily  Patient taking differently: Take 2.5 mg by mouth daily  4/24/16  Yes Angelo Goltz, MD     Vitals:    09/22/20 0959   BP: 112/74   Pulse: 64   Temp: 96.8 °F (36 °C)   Weight: 142 lb (64.4 kg)   Height: 5' 4\" (1.626 m)      Body mass index is 24.37 kg/m². Wt Readings from Last 3 Encounters:   09/22/20 142 lb (64.4 kg)   07/11/19 156 lb (70.8 kg)   06/13/19 156 lb (70.8 kg)     Constitutional:  Patient is normally built and nourished elderly female in no apparent distress. She walks with a walker. Eyes: Pupils are equal and she wears glasses. She is wearing a facemask. NECK: No JVP or thyromegaly  Cardiovascular: Auscultation: Normal S1 and S2.  2/6 systolic murmur best heard in the left sternal border third space radiates towards the apex but does not radiate to axilla. It does radiate to epigastric area. Carotids are free of bruits. Abdominal aorta is nonpalpable. Conducted murmur versus epigastric bruit noted. Peripheral pulses: Pedal pulses are 1+ equal in both feet.    Respiratory:  Respiratory effort is normal. Breath sounds are clear to auscultation. Extremities:  No edema, clubbing,  Cyanosis, petechiae. SKIN: Warm and well perfused, no pallor or cyanosis  Abdomen:  No masses or tenderness. No organomegaly noted. Musculoskeletal: Kyphotic spinal deformities noted. Gait is unsteady and unstable walks with a walker. Muscle strength is normal for age. Neurologic:  Oriented to time, place, and person and non-anxious. No focal neurological deficit noted. Psychiatric: Normal mood and effect. EKG today is consistent with normal sinus rhythm 64 bpm.  QTc is 439 ms. Poor R wave progression noted in precordial leads. LAB REVIEW:  CBC:   Lab Results   Component Value Date    WBC 9.9 10/22/2018    HGB 11.6 10/22/2018    HCT 37.2 10/22/2018     10/22/2018     Lipids:   Lab Results   Component Value Date    CHOL 120 08/05/2013    TRIG 167 (H) 08/05/2013    HDL 44 08/05/2013    LDLCALC 43 08/05/2013     Renal:   Lab Results   Component Value Date    BUN 14 10/22/2018    CREATININE 0.8 10/22/2018     10/22/2018    K 4.3 10/25/2018     PT/INR:   Lab Results   Component Value Date    INR 2.29 01/23/2020     [id number] Score for Atrial Fibrillation Stroke Risk=4    IMPRESSION and RECOMMENDATIONS:      Encounter for monitoring flecainide therapy  Tolerating it well. Continue current dose of flecainide. Heart murmur  Probably tricuspid regurgitation. We will obtain an echocardiogram to evaluate it further. There are no echoes in the records from past.  She had one ordered in 2018 which did not follow through. HTN (hypertension)  Fairly well-controlled on current medications continue the same. Paroxysmal atrial fibrillation (HCC)  Well-controlled on flecainide 50 mg twice a day and Lopressor 25 mg twice a day. Continue the same. Patient is anticoagulated for chads vascular score of 4. Echocardiogram to assess systolic murmur. Continue current medications.   Appropriate prescriptions if needed on

## 2020-09-22 NOTE — PATIENT INSTRUCTIONS
Echocardiogram to assess systolic murmur. Continue current medications. Appropriate prescriptions if needed on this visit are addressed. After visit summery is provided. Questions answered and patient verbalizes understanding. Follow up in 8 months with ECG,  sooner if needed.

## 2020-09-22 NOTE — ASSESSMENT & PLAN NOTE
Probably tricuspid regurgitation. We will obtain an echocardiogram to evaluate it further. There are no echoes in the records from past.  She had one ordered in 2018 which did not follow through.

## 2020-09-22 NOTE — ASSESSMENT & PLAN NOTE
Well-controlled on flecainide 50 mg twice a day and Lopressor 25 mg twice a day. Continue the same. Patient is anticoagulated for chads vascular score of 4.

## 2020-09-28 NOTE — TELEPHONE ENCOUNTER
eRx to [address] [place] [address]:  Metoprolol tartrate 25 mg BID #60 Rx6  pended and routed to Dr. Nohemi Colon for approval.

## 2020-10-15 ENCOUNTER — PROCEDURE VISIT (OUTPATIENT)
Dept: CARDIOLOGY CLINIC | Age: 85
End: 2020-10-15
Payer: MEDICARE

## 2020-10-15 LAB
LV EF: 58 %
LVEF MODALITY: NORMAL

## 2020-10-15 PROCEDURE — 93306 TTE W/DOPPLER COMPLETE: CPT | Performed by: INTERNAL MEDICINE

## 2020-10-21 ENCOUNTER — TELEPHONE (OUTPATIENT)
Dept: CARDIOLOGY CLINIC | Age: 85
End: 2020-10-21

## 2020-10-21 NOTE — TELEPHONE ENCOUNTER
Technically difficult examination Patient   Left ventricular function is normal, EF is estimated at 55-60%. Normal diastolic filling pattern for age. Mildly dilated left atrium. Sclerotic aortic valve with mild stenosis. Mitral annular calcification is present. Mild mitral stenosis with a mean gradient of 4mmHg. Mild mitral and tricuspid regurgitation is present. No evidence of pericardial effusion. Pt notified of ECHO test results; pt voiced understanding.

## 2021-05-21 ENCOUNTER — OFFICE VISIT (OUTPATIENT)
Dept: CARDIOLOGY CLINIC | Age: 86
End: 2021-05-21
Payer: MEDICARE

## 2021-05-21 VITALS
WEIGHT: 145.6 LBS | HEIGHT: 66 IN | SYSTOLIC BLOOD PRESSURE: 104 MMHG | BODY MASS INDEX: 23.4 KG/M2 | DIASTOLIC BLOOD PRESSURE: 62 MMHG | HEART RATE: 52 BPM

## 2021-05-21 DIAGNOSIS — R53.83 FATIGUE, UNSPECIFIED TYPE: ICD-10-CM

## 2021-05-21 DIAGNOSIS — Z51.81 ENCOUNTER FOR MONITORING FLECAINIDE THERAPY: ICD-10-CM

## 2021-05-21 DIAGNOSIS — R06.02 SHORTNESS OF BREATH: ICD-10-CM

## 2021-05-21 DIAGNOSIS — Z79.899 ENCOUNTER FOR MONITORING FLECAINIDE THERAPY: ICD-10-CM

## 2021-05-21 DIAGNOSIS — I10 ESSENTIAL HYPERTENSION: Chronic | ICD-10-CM

## 2021-05-21 DIAGNOSIS — I48.0 PAROXYSMAL ATRIAL FIBRILLATION (HCC): Primary | ICD-10-CM

## 2021-05-21 PROCEDURE — 93000 ELECTROCARDIOGRAM COMPLETE: CPT | Performed by: NURSE PRACTITIONER

## 2021-05-21 PROCEDURE — 99214 OFFICE O/P EST MOD 30 MIN: CPT | Performed by: NURSE PRACTITIONER

## 2021-05-21 NOTE — ASSESSMENT & PLAN NOTE
 Rate controlled yes.  Chadsvasc score is 4   She is  on anticoagulation.  Continue flecanide and warfarin, decrease metoprolol to 12.5 mg BID due to bradycardia, fatigue   EKG today:sinus jania rate 51, Qtc 498   Check BMP, CBC, and TSH as she has thyroid dysfunction and she has not seen provider for a long time to manage thyroid.  Encouraged to call PCP office to discus fatigue also. [id number] Score for Atrial Fibrillation Stroke Risk   Risk   Factors  Component Value   C CHF No 0   H HTN Yes 1   A2 Age >= 76 Yes,  (82 y.o.) 2   D DM No 0   S2 Prior Stroke/TIA No 0   V Vascular Disease No 0   A Age 74-69 No,  (82 y.o.) 0   Sc Sex female 1    [id number]  Score  4   Score last updated 5/21/21 75:41 AM EDT    Click here for a link to the UpToDate guideline \"Atrial Fibrillation: Anticoagulation therapy to prevent embolization    Disclaimer: Risk Score calculation is dependent on accuracy of patient problem list and past encounter diagnosis.

## 2021-05-21 NOTE — PROGRESS NOTES
[facility] PH[facility]ICAL [facility]IL[facility]ATION [place]  [address], [address] [facility],[address]  Phone: [phone]    Fax [phone]    Renny Yanez MD, Obinna Espinoza MD, [address] [place] MD Jakub, MD Jenna Cristobal MD Kristan Stare, MD Janeen Griffes, MD Laverta Carrel, GAUDENCIO Estrada, GAUDENCIO Charles, GAUDENCIO Mcneal, APRN    CARDIOLOGY  NOTE    2021    Beata Palacios (:  1935) is a 82 y.o. female,Established patient with Dr. Luli Shi, here for evaluation of the following chief complaint(s):  Hypertension and Atrial Fibrillation        SUBJECTIVE/OBJECTIVE:    Beata Palacios  is a 82 y.o. female following up for history of paroxysmal atrial fibrillation on flecainide therapy and has hypertension and history of palpitations. She recently restarted seeing Dr. Luli Shi. She has been compliant to her medications and has been following up with PCP closely. She is on warfarin and PT/INR is monitored by PCP regularly. She is very much limited in her activity due to arthritis and she walks with a walker very slowly. She does not smoke. SHe has been having increase in her fatigue and shortness of breath. She states that she feels tired all the time and her shortness of breath has worsened and is limiting her quality of life. She denies chest pain, palpitations dizziness, orthopnea, lower leg swelling or syncope. Review of Systems   Constitutional: Positive for fatigue. Negative for fever. Respiratory: Positive for shortness of breath. Negative for cough. Cardiovascular: Negative for chest pain, palpitations and leg swelling. Musculoskeletal: Positive for gait problem. Negative for arthralgias. Neurological: Negative for dizziness, syncope, weakness, light-headedness and headaches.        Vitals:    21 1128   BP: 104/62   Pulse: 52   Weight: 145 lb 9.6 oz (66 kg)   Height: 5' 6\" (1.676 m)       Wt Readings from Last 3 Encounters:   20 142 lb (64.4 kg)   07/11/19 156 lb (70.8 kg)   06/13/19 156 lb (70.8 kg)       BP Readings from Last 3 Encounters:   09/22/20 112/74   07/11/19 132/84   06/28/19 (!) 108/52       Prior to Admission medications    Medication Sig Start Date End Date Taking? Authorizing Provider   metoprolol tartrate (LOPRESSOR) 25 MG tablet Take one tablet twice daily 2/8/21   Jessica Gordon MD   flecainide [facility] [place]) 50 MG tablet Take 1 tablet by mouth 2 times daily 9/22/20   Jessica Gordon MD   levothyroxine (SYNTHROID) 75 MCG tablet Take 75 mcg by mouth Daily    Historical Provider, MD   donepezil (ARICEPT) 10 MG tablet Take 10 mg by mouth nightly    Historical Provider, MD   baclofen (LIORESAL) 10 MG tablet Take 10 mg by mouth 2 times daily    Historical Provider, MD   furosemide (LASIX) 40 MG tablet 40 mg daily  5/6/16   Historical Provider, MD   omeprazole (PRILOSEC) 40 MG capsule Take 40 mg by mouth daily    Historical Provider, MD   warfarin (COUMADIN) 5 MG tablet Take 1 tablet by mouth daily  Patient taking differently: Take 2.5 mg by mouth daily  4/24/16   Grace Moise MD       Physical Exam  Vitals reviewed. Constitutional:       General: She is not in acute distress. Appearance: Normal appearance. She is not ill-appearing. HENT:      Head: Atraumatic. Neck:      Vascular: No carotid bruit. Cardiovascular:      Rate and Rhythm: Normal rate and regular rhythm. Pulses: Normal pulses. Heart sounds: Normal heart sounds. No murmur heard. Pulmonary:      Effort: Pulmonary effort is normal. No respiratory distress. Breath sounds: Normal breath sounds. Musculoskeletal:         General: No swelling or deformity. Cervical back: Neck supple. No muscular tenderness. Neurological:      Mental Status: She is alert.          Health Maintenance   Topic Date Due    COVID-19 Vaccine (1) Never done    Shingles Vaccine (1 of 2) Never done    TSH testing  04/23/2017   [facility] Annual Wellness Visit (AWV) Never done    Creatinine monitoring  10/22/2019    Potassium monitoring  10/25/2019    Flu vaccine (Season Ended) 09/01/2021    DTaP/Tdap/Td vaccine (4 - Td) 12/29/2024    Pneumococcal 65+ years Vaccine  Completed    DEXA (modify frequency per FRAX score)  Addressed    Hepatitis A vaccine  Aged Out    Hepatitis B vaccine  Aged Out    Hib vaccine  Aged Out    Meningococcal (ACWY) vaccine  Aged Out         ASSESSMENT/PLAN:    1. Paroxysmal atrial fibrillation (HCC)  Assessment & Plan:   Rate controlled yes.  Chadsvasc score is 4   She is  on anticoagulation.  Continue flecanide and warfarin, decrease metoprolol to 12.5 mg BID due to bradycardia, fatigue   EKG today:sinus jania rate 51, Qtc 498   Check BMP, CBC, and TSH as she has thyroid dysfunction and she has not seen provider for a long time to manage thyroid.  Encouraged to call PCP office to discus fatigue also. [id number] Score for Atrial Fibrillation Stroke Risk   Risk   Factors  Component Value   C CHF No 0   H HTN Yes 1   A2 Age >= 76 Yes,  (82 y.o.) 2   D DM No 0   S2 Prior Stroke/TIA No 0   V Vascular Disease No 0   A Age 74-69 No,  (82 y.o.) 0   Sc Sex female 1    [id number]  Score  4   Score last updated 5/21/21 30:76 AM EDT    Click here for a link to the UpToDate guideline \"Atrial Fibrillation: Anticoagulation therapy to prevent embolization    Disclaimer: Risk Score calculation is dependent on accuracy of patient problem list and past encounter diagnosis. Orders:  -     EKG 12 Lead; Standing  -     Basic Metabolic Panel; Future  -     MAGNESIUM; Future  -     TSH WITH REFLEX TO FT4; Future  -     CBC; Future  2. Encounter for monitoring flecainide therapy  Assessment & Plan:  EKG shows sinus rhythm with rate 51 and Qtc of 498  Continue flecanide  3. Essential hypertension   Controlled   Decrease metoprolol to 12.5mg BID and continue lasix   advised low salt diet   -     Basic Metabolic Panel;  Future  -     MAGNESIUM; Future  -     TSH WITH REFLEX TO FT4; Future  -     CBC; Future  4. Fatigue, unspecified type  Check CBC, TSH, BMP and stress test.  -     NM MYOCARDIAL SPECT REST EXERCISE OR RX; Future  5. Shortness of breath  Check stress test. She does not have known CAD.  -     NM MYOCARDIAL SPECT REST EXERCISE OR RX; Future      Return in about 1 month (around 6/21/2021) for with Dr. Nicki Mejía, or sonner if needed. An electronic signature was used to authenticate this note.     Electronically signed by GAUDENCIO Pierre CNP on 5/21/2021 at 7:08 AM

## 2021-05-31 ASSESSMENT — ENCOUNTER SYMPTOMS
SHORTNESS OF BREATH: 1
COUGH: 0

## 2021-06-24 ENCOUNTER — HOSPITAL ENCOUNTER (INPATIENT)
Age: 86
LOS: 2 days | Discharge: HOME | DRG: 683 | End: 2021-06-26
Attending: EMERGENCY MEDICINE | Admitting: INTERNAL MEDICINE
Payer: MEDICARE

## 2021-06-24 ENCOUNTER — APPOINTMENT (OUTPATIENT)
Dept: CT IMAGING | Age: 86
DRG: 683 | End: 2021-06-24
Payer: MEDICARE

## 2021-06-24 DIAGNOSIS — E87.6 HYPOKALEMIA: ICD-10-CM

## 2021-06-24 DIAGNOSIS — R19.7 DIARRHEA, UNSPECIFIED TYPE: Primary | ICD-10-CM

## 2021-06-24 DIAGNOSIS — R53.83 FATIGUE, UNSPECIFIED TYPE: ICD-10-CM

## 2021-06-24 DIAGNOSIS — R11.0 NAUSEA: ICD-10-CM

## 2021-06-24 PROBLEM — E87.20 LACTIC ACIDOSIS: Status: ACTIVE | Noted: 2021-06-24

## 2021-06-24 PROBLEM — E83.42 HYPOMAGNESEMIA: Status: ACTIVE | Noted: 2021-06-24

## 2021-06-24 PROBLEM — N17.9 AKI (ACUTE KIDNEY INJURY) (HCC): Status: ACTIVE | Noted: 2021-06-24

## 2021-06-24 LAB
ALBUMIN SERPL-MCNC: 3 GM/DL (ref 3.4–5)
ALP BLD-CCNC: 102 IU/L (ref 40–129)
ALT SERPL-CCNC: 8 U/L (ref 10–40)
ANION GAP SERPL CALCULATED.3IONS-SCNC: 11 MMOL/L (ref 4–16)
AST SERPL-CCNC: 20 IU/L (ref 15–37)
BACTERIA: ABNORMAL /HPF
BASOPHILS ABSOLUTE: 0.1 K/CU MM
BASOPHILS RELATIVE PERCENT: 0.6 % (ref 0–1)
BILIRUB SERPL-MCNC: 0.8 MG/DL (ref 0–1)
BILIRUBIN URINE: NEGATIVE MG/DL
BLOOD, URINE: ABNORMAL
BUN BLDV-MCNC: 16 MG/DL (ref 6–23)
CALCIUM SERPL-MCNC: 8 MG/DL (ref 8.3–10.6)
CHLORIDE BLD-SCNC: 87 MMOL/L (ref 99–110)
CLARITY: ABNORMAL
CO2: 35 MMOL/L (ref 21–32)
COLOR: YELLOW
CREAT SERPL-MCNC: 1.3 MG/DL (ref 0.6–1.1)
DIFFERENTIAL TYPE: ABNORMAL
EOSINOPHILS ABSOLUTE: 0.2 K/CU MM
EOSINOPHILS RELATIVE PERCENT: 1.4 % (ref 0–3)
EPITHELIAL CELLS, UA: ABNORMAL /HPF
GFR AFRICAN AMERICAN: 47 ML/MIN/1.73M2
GFR NON-AFRICAN AMERICAN: 39 ML/MIN/1.73M2
GLUCOSE BLD-MCNC: 110 MG/DL (ref 70–99)
GLUCOSE, URINE: NEGATIVE MG/DL
HCT VFR BLD CALC: 39.7 % (ref 37–47)
HEMOGLOBIN: 13.5 GM/DL (ref 12.5–16)
IMMATURE NEUTROPHIL %: 0.4 % (ref 0–0.43)
INR BLD: 1.73 INDEX
KETONES, URINE: NEGATIVE MG/DL
LACTATE: 3.1 MMOL/L (ref 0.4–2)
LEUKOCYTE ESTERASE, URINE: ABNORMAL
LIPASE: 35 IU/L (ref 13–60)
LYMPHOCYTES ABSOLUTE: 2.8 K/CU MM
LYMPHOCYTES RELATIVE PERCENT: 22.1 % (ref 24–44)
MAGNESIUM: 1.7 MG/DL (ref 1.8–2.4)
MCH RBC QN AUTO: 30.3 PG (ref 27–31)
MCHC RBC AUTO-ENTMCNC: 34 % (ref 32–36)
MCV RBC AUTO: 89.2 FL (ref 78–100)
MONOCYTES ABSOLUTE: 1.2 K/CU MM
MONOCYTES RELATIVE PERCENT: 9.4 % (ref 0–4)
NITRITE URINE, QUANTITATIVE: NEGATIVE
PDW BLD-RTO: 14.1 % (ref 11.7–14.9)
PH, URINE: 7 (ref 5–8)
PLATELET # BLD: 335 K/CU MM (ref 140–440)
PMV BLD AUTO: 9.5 FL (ref 7.5–11.1)
POTASSIUM SERPL-SCNC: 2.3 MMOL/L (ref 3.5–5.1)
PROTEIN UA: NEGATIVE MG/DL
PROTHROMBIN TIME: 19.9 SECONDS (ref 11.7–14.5)
RBC # BLD: 4.45 M/CU MM (ref 4.2–5.4)
RBC URINE: ABNORMAL /HPF (ref 0–6)
SEGMENTED NEUTROPHILS ABSOLUTE COUNT: 8.5 K/CU MM
SEGMENTED NEUTROPHILS RELATIVE PERCENT: 66.1 % (ref 36–66)
SODIUM BLD-SCNC: 133 MMOL/L (ref 135–145)
SPECIFIC GRAVITY UA: 1.01 (ref 1–1.03)
TOTAL IMMATURE NEUTOROPHIL: 0.05 K/CU MM
TOTAL PROTEIN: 6.2 GM/DL (ref 6.4–8.2)
UROBILINOGEN, URINE: NEGATIVE MG/DL (ref 0.2–1)
WBC # BLD: 12.8 K/CU MM (ref 4–10.5)
WBC UA: ABNORMAL /HPF (ref 0–5)

## 2021-06-24 PROCEDURE — 85610 PROTHROMBIN TIME: CPT

## 2021-06-24 PROCEDURE — 1200000000 HC SEMI PRIVATE

## 2021-06-24 PROCEDURE — 6360000002 HC RX W HCPCS: Performed by: EMERGENCY MEDICINE

## 2021-06-24 PROCEDURE — 96361 HYDRATE IV INFUSION ADD-ON: CPT

## 2021-06-24 PROCEDURE — G0378 HOSPITAL OBSERVATION PER HR: HCPCS

## 2021-06-24 PROCEDURE — 85025 COMPLETE CBC W/AUTO DIFF WBC: CPT

## 2021-06-24 PROCEDURE — 83690 ASSAY OF LIPASE: CPT

## 2021-06-24 PROCEDURE — 83735 ASSAY OF MAGNESIUM: CPT

## 2021-06-24 PROCEDURE — 87077 CULTURE AEROBIC IDENTIFY: CPT

## 2021-06-24 PROCEDURE — 80053 COMPREHEN METABOLIC PANEL: CPT

## 2021-06-24 PROCEDURE — 99283 EMERGENCY DEPT VISIT LOW MDM: CPT

## 2021-06-24 PROCEDURE — 96365 THER/PROPH/DIAG IV INF INIT: CPT

## 2021-06-24 PROCEDURE — 87328 CRYPTOSPORIDIUM AG IA: CPT

## 2021-06-24 PROCEDURE — 36415 COLL VENOUS BLD VENIPUNCTURE: CPT

## 2021-06-24 PROCEDURE — 81001 URINALYSIS AUTO W/SCOPE: CPT

## 2021-06-24 PROCEDURE — 93005 ELECTROCARDIOGRAM TRACING: CPT | Performed by: EMERGENCY MEDICINE

## 2021-06-24 PROCEDURE — 6370000000 HC RX 637 (ALT 250 FOR IP): Performed by: NURSE PRACTITIONER

## 2021-06-24 PROCEDURE — 87086 URINE CULTURE/COLONY COUNT: CPT

## 2021-06-24 PROCEDURE — 87329 GIARDIA AG IA: CPT

## 2021-06-24 PROCEDURE — 74176 CT ABD & PELVIS W/O CONTRAST: CPT

## 2021-06-24 PROCEDURE — 83605 ASSAY OF LACTIC ACID: CPT

## 2021-06-24 PROCEDURE — 87186 SC STD MICRODIL/AGAR DIL: CPT

## 2021-06-24 PROCEDURE — 2580000003 HC RX 258: Performed by: EMERGENCY MEDICINE

## 2021-06-24 RX ORDER — SODIUM CHLORIDE 0.9 % (FLUSH) 0.9 %
5-40 SYRINGE (ML) INJECTION EVERY 12 HOURS SCHEDULED
Status: DISCONTINUED | OUTPATIENT
Start: 2021-06-24 | End: 2021-06-26 | Stop reason: HOSPADM

## 2021-06-24 RX ORDER — 0.9 % SODIUM CHLORIDE 0.9 %
500 INTRAVENOUS SOLUTION INTRAVENOUS ONCE
Status: COMPLETED | OUTPATIENT
Start: 2021-06-24 | End: 2021-06-24

## 2021-06-24 RX ORDER — ACETAMINOPHEN 325 MG/1
650 TABLET ORAL EVERY 6 HOURS PRN
Status: DISCONTINUED | OUTPATIENT
Start: 2021-06-24 | End: 2021-06-26 | Stop reason: HOSPADM

## 2021-06-24 RX ORDER — SODIUM CHLORIDE 0.9 % (FLUSH) 0.9 %
5-40 SYRINGE (ML) INJECTION PRN
Status: DISCONTINUED | OUTPATIENT
Start: 2021-06-24 | End: 2021-06-26 | Stop reason: HOSPADM

## 2021-06-24 RX ORDER — SODIUM CHLORIDE 9 MG/ML
25 INJECTION, SOLUTION INTRAVENOUS PRN
Status: DISCONTINUED | OUTPATIENT
Start: 2021-06-24 | End: 2021-06-26 | Stop reason: HOSPADM

## 2021-06-24 RX ORDER — POTASSIUM CHLORIDE 7.45 MG/ML
10 INJECTION INTRAVENOUS ONCE
Status: COMPLETED | OUTPATIENT
Start: 2021-06-24 | End: 2021-06-24

## 2021-06-24 RX ORDER — LEVOTHYROXINE SODIUM 0.07 MG/1
75 TABLET ORAL DAILY
Status: DISCONTINUED | OUTPATIENT
Start: 2021-06-25 | End: 2021-06-26 | Stop reason: HOSPADM

## 2021-06-24 RX ORDER — SODIUM CHLORIDE 9 MG/ML
INJECTION, SOLUTION INTRAVENOUS CONTINUOUS
Status: DISCONTINUED | OUTPATIENT
Start: 2021-06-25 | End: 2021-06-26 | Stop reason: HOSPADM

## 2021-06-24 RX ORDER — LACTOBACILLUS RHAMNOSUS GG 10B CELL
1 CAPSULE ORAL
Status: DISCONTINUED | OUTPATIENT
Start: 2021-06-25 | End: 2021-06-26 | Stop reason: HOSPADM

## 2021-06-24 RX ORDER — ONDANSETRON 2 MG/ML
4 INJECTION INTRAMUSCULAR; INTRAVENOUS EVERY 6 HOURS PRN
Status: DISCONTINUED | OUTPATIENT
Start: 2021-06-24 | End: 2021-06-26 | Stop reason: HOSPADM

## 2021-06-24 RX ORDER — ACETAMINOPHEN 650 MG/1
650 SUPPOSITORY RECTAL EVERY 6 HOURS PRN
Status: DISCONTINUED | OUTPATIENT
Start: 2021-06-24 | End: 2021-06-26 | Stop reason: HOSPADM

## 2021-06-24 RX ORDER — PANTOPRAZOLE SODIUM 40 MG/1
40 TABLET, DELAYED RELEASE ORAL
Status: DISCONTINUED | OUTPATIENT
Start: 2021-06-25 | End: 2021-06-26 | Stop reason: HOSPADM

## 2021-06-24 RX ORDER — SODIUM CHLORIDE AND POTASSIUM CHLORIDE .9; .15 G/100ML; G/100ML
SOLUTION INTRAVENOUS ONCE
Status: COMPLETED | OUTPATIENT
Start: 2021-06-24 | End: 2021-06-25

## 2021-06-24 RX ORDER — WARFARIN SODIUM 4 MG/1
4 TABLET ORAL DAILY
Status: DISCONTINUED | OUTPATIENT
Start: 2021-06-24 | End: 2021-06-25

## 2021-06-24 RX ORDER — FUROSEMIDE 40 MG/1
40 TABLET ORAL DAILY
Status: DISCONTINUED | OUTPATIENT
Start: 2021-06-25 | End: 2021-06-26 | Stop reason: HOSPADM

## 2021-06-24 RX ORDER — SODIUM CHLORIDE 9 MG/ML
INJECTION, SOLUTION INTRAVENOUS CONTINUOUS
Status: DISCONTINUED | OUTPATIENT
Start: 2021-06-24 | End: 2021-06-24

## 2021-06-24 RX ORDER — MAGNESIUM SULFATE 1 G/100ML
1000 INJECTION INTRAVENOUS PRN
Status: DISCONTINUED | OUTPATIENT
Start: 2021-06-24 | End: 2021-06-26 | Stop reason: HOSPADM

## 2021-06-24 RX ORDER — FLECAINIDE ACETATE 50 MG/1
50 TABLET ORAL 2 TIMES DAILY
Status: DISCONTINUED | OUTPATIENT
Start: 2021-06-24 | End: 2021-06-26 | Stop reason: HOSPADM

## 2021-06-24 RX ORDER — DONEPEZIL HYDROCHLORIDE 10 MG/1
10 TABLET, FILM COATED ORAL NIGHTLY
Status: DISCONTINUED | OUTPATIENT
Start: 2021-06-24 | End: 2021-06-26 | Stop reason: HOSPADM

## 2021-06-24 RX ORDER — ONDANSETRON 4 MG/1
4 TABLET, ORALLY DISINTEGRATING ORAL EVERY 8 HOURS PRN
Status: DISCONTINUED | OUTPATIENT
Start: 2021-06-24 | End: 2021-06-26 | Stop reason: HOSPADM

## 2021-06-24 RX ADMIN — FLECAINIDE ACETATE 50 MG: 50 TABLET ORAL at 22:34

## 2021-06-24 RX ADMIN — SODIUM CHLORIDE 1000 ML: 9 INJECTION, SOLUTION INTRAVENOUS at 20:33

## 2021-06-24 RX ADMIN — SODIUM CHLORIDE 500 ML: 9 INJECTION, SOLUTION INTRAVENOUS at 19:07

## 2021-06-24 RX ADMIN — POTASSIUM CHLORIDE 10 MEQ: 10 INJECTION, SOLUTION INTRAVENOUS at 20:39

## 2021-06-24 ASSESSMENT — ENCOUNTER SYMPTOMS
RECENT COUGH: 0
BLOOD IN STOOL: 0
EYES NEGATIVE: 1
DIARRHEA: 1
ABDOMINAL PAIN: 1
CONSTIPATION: 0
NAUSEA: 1
RESPIRATORY NEGATIVE: 1
ABDOMINAL DISTENTION: 0
VOMITING: 0
ABDOMINAL PAIN: 0

## 2021-06-24 ASSESSMENT — PAIN DESCRIPTION - LOCATION: LOCATION: ABDOMEN

## 2021-06-24 ASSESSMENT — PAIN DESCRIPTION - DESCRIPTORS: DESCRIPTORS: CRAMPING

## 2021-06-24 ASSESSMENT — PAIN SCALES - GENERAL
PAINLEVEL_OUTOF10: 0
PAINLEVEL_OUTOF10: 2

## 2021-06-24 ASSESSMENT — PAIN DESCRIPTION - FREQUENCY: FREQUENCY: INTERMITTENT

## 2021-06-25 LAB
ALBUMIN SERPL-MCNC: 2.3 GM/DL (ref 3.4–5)
ALP BLD-CCNC: 75 IU/L (ref 40–129)
ALT SERPL-CCNC: 5 U/L (ref 10–40)
ANION GAP SERPL CALCULATED.3IONS-SCNC: 0 MMOL/L (ref 4–16)
ANION GAP SERPL CALCULATED.3IONS-SCNC: 15 MMOL/L (ref 4–16)
AST SERPL-CCNC: 14 IU/L (ref 15–37)
BASOPHILS ABSOLUTE: 0.1 K/CU MM
BASOPHILS RELATIVE PERCENT: 0.6 % (ref 0–1)
BILIRUB SERPL-MCNC: 0.8 MG/DL (ref 0–1)
BUN BLDV-MCNC: 12 MG/DL (ref 6–23)
BUN BLDV-MCNC: 13 MG/DL (ref 6–23)
CALCIUM SERPL-MCNC: 7.1 MG/DL (ref 8.3–10.6)
CALCIUM SERPL-MCNC: 7.1 MG/DL (ref 8.3–10.6)
CHLORIDE BLD-SCNC: 95 MMOL/L (ref 99–110)
CHLORIDE BLD-SCNC: 97 MMOL/L (ref 99–110)
CO2: 24 MMOL/L (ref 21–32)
CO2: 39 MMOL/L (ref 21–32)
CREAT SERPL-MCNC: 1.1 MG/DL (ref 0.6–1.1)
CREAT SERPL-MCNC: 1.1 MG/DL (ref 0.6–1.1)
DIFFERENTIAL TYPE: ABNORMAL
EKG ATRIAL RATE: 258 BPM
EKG DIAGNOSIS: NORMAL
EKG Q-T INTERVAL: 374 MS
EKG QRS DURATION: 90 MS
EKG QTC CALCULATION (BAZETT): 354 MS
EKG R AXIS: -43 DEGREES
EKG T AXIS: 54 DEGREES
EKG VENTRICULAR RATE: 54 BPM
EOSINOPHILS ABSOLUTE: 0.3 K/CU MM
EOSINOPHILS RELATIVE PERCENT: 3.6 % (ref 0–3)
GFR AFRICAN AMERICAN: 57 ML/MIN/1.73M2
GFR AFRICAN AMERICAN: 57 ML/MIN/1.73M2
GFR NON-AFRICAN AMERICAN: 47 ML/MIN/1.73M2
GFR NON-AFRICAN AMERICAN: 47 ML/MIN/1.73M2
GLUCOSE BLD-MCNC: 87 MG/DL (ref 70–99)
GLUCOSE BLD-MCNC: 90 MG/DL (ref 70–99)
HCT VFR BLD CALC: 35.7 % (ref 37–47)
HEMOGLOBIN: 11.9 GM/DL (ref 12.5–16)
IMMATURE NEUTROPHIL %: 0.4 % (ref 0–0.43)
INR BLD: 1.73 INDEX
LACTATE: 1.3 MMOL/L (ref 0.4–2)
LACTATE: 2 MMOL/L (ref 0.4–2)
LACTATE: 3.2 MMOL/L (ref 0.4–2)
LYMPHOCYTES ABSOLUTE: 2.3 K/CU MM
LYMPHOCYTES RELATIVE PERCENT: 24.8 % (ref 24–44)
MAGNESIUM: 1.5 MG/DL (ref 1.8–2.4)
MAGNESIUM: 1.5 MG/DL (ref 1.8–2.4)
MCH RBC QN AUTO: 30.1 PG (ref 27–31)
MCHC RBC AUTO-ENTMCNC: 33.3 % (ref 32–36)
MCV RBC AUTO: 90.2 FL (ref 78–100)
MONOCYTES ABSOLUTE: 0.9 K/CU MM
MONOCYTES RELATIVE PERCENT: 9.3 % (ref 0–4)
PDW BLD-RTO: 14.3 % (ref 11.7–14.9)
PHOSPHORUS: 2.5 MG/DL (ref 2.5–4.9)
PLATELET # BLD: 276 K/CU MM (ref 140–440)
PMV BLD AUTO: 9.2 FL (ref 7.5–11.1)
POTASSIUM SERPL-SCNC: 2.3 MMOL/L (ref 3.5–5.1)
POTASSIUM SERPL-SCNC: 3.5 MMOL/L (ref 3.5–5.1)
POTASSIUM SERPL-SCNC: ABNORMAL MMOL/L (ref 3.5–5.1)
PROCALCITONIN: 0.05
PROTHROMBIN TIME: 19.9 SECONDS (ref 11.7–14.5)
RBC # BLD: 3.96 M/CU MM (ref 4.2–5.4)
SEGMENTED NEUTROPHILS ABSOLUTE COUNT: 5.7 K/CU MM
SEGMENTED NEUTROPHILS RELATIVE PERCENT: 61.3 % (ref 36–66)
SODIUM BLD-SCNC: 134 MMOL/L (ref 135–145)
SODIUM BLD-SCNC: 136 MMOL/L (ref 135–145)
TOTAL IMMATURE NEUTOROPHIL: 0.04 K/CU MM
TOTAL PROTEIN: 4.7 GM/DL (ref 6.4–8.2)
TSH HIGH SENSITIVITY: 3.06 UIU/ML (ref 0.27–4.2)
WBC # BLD: 9.3 K/CU MM (ref 4–10.5)

## 2021-06-25 PROCEDURE — 83630 LACTOFERRIN FECAL (QUAL): CPT

## 2021-06-25 PROCEDURE — 84132 ASSAY OF SERUM POTASSIUM: CPT

## 2021-06-25 PROCEDURE — 87324 CLOSTRIDIUM AG IA: CPT

## 2021-06-25 PROCEDURE — 87425 ROTAVIRUS AG IA: CPT

## 2021-06-25 PROCEDURE — 80053 COMPREHEN METABOLIC PANEL: CPT

## 2021-06-25 PROCEDURE — 82272 OCCULT BLD FECES 1-3 TESTS: CPT

## 2021-06-25 PROCEDURE — 2580000003 HC RX 258: Performed by: NURSE PRACTITIONER

## 2021-06-25 PROCEDURE — 6370000000 HC RX 637 (ALT 250 FOR IP): Performed by: NURSE PRACTITIONER

## 2021-06-25 PROCEDURE — 85025 COMPLETE CBC W/AUTO DIFF WBC: CPT

## 2021-06-25 PROCEDURE — 83605 ASSAY OF LACTIC ACID: CPT

## 2021-06-25 PROCEDURE — G0378 HOSPITAL OBSERVATION PER HR: HCPCS

## 2021-06-25 PROCEDURE — 87329 GIARDIA AG IA: CPT

## 2021-06-25 PROCEDURE — 84100 ASSAY OF PHOSPHORUS: CPT

## 2021-06-25 PROCEDURE — 80048 BASIC METABOLIC PNL TOTAL CA: CPT

## 2021-06-25 PROCEDURE — 6360000002 HC RX W HCPCS: Performed by: NURSE PRACTITIONER

## 2021-06-25 PROCEDURE — 2140000000 HC CCU INTERMEDIATE R&B

## 2021-06-25 PROCEDURE — 84443 ASSAY THYROID STIM HORMONE: CPT

## 2021-06-25 PROCEDURE — 96367 TX/PROPH/DG ADDL SEQ IV INF: CPT

## 2021-06-25 PROCEDURE — 96366 THER/PROPH/DIAG IV INF ADDON: CPT

## 2021-06-25 PROCEDURE — 6370000000 HC RX 637 (ALT 250 FOR IP): Performed by: HOSPITALIST

## 2021-06-25 PROCEDURE — 87449 NOS EACH ORGANISM AG IA: CPT

## 2021-06-25 PROCEDURE — 93010 ELECTROCARDIOGRAM REPORT: CPT | Performed by: INTERNAL MEDICINE

## 2021-06-25 PROCEDURE — 36415 COLL VENOUS BLD VENIPUNCTURE: CPT

## 2021-06-25 PROCEDURE — 84145 PROCALCITONIN (PCT): CPT

## 2021-06-25 PROCEDURE — 85610 PROTHROMBIN TIME: CPT

## 2021-06-25 PROCEDURE — 83735 ASSAY OF MAGNESIUM: CPT

## 2021-06-25 PROCEDURE — 87338 HPYLORI STOOL AG IA: CPT

## 2021-06-25 RX ORDER — WARFARIN SODIUM 6 MG/1
6 TABLET ORAL
Status: COMPLETED | OUTPATIENT
Start: 2021-06-25 | End: 2021-06-25

## 2021-06-25 RX ORDER — WARFARIN SODIUM 4 MG/1
4 TABLET ORAL DAILY
Status: DISCONTINUED | OUTPATIENT
Start: 2021-06-26 | End: 2021-06-26

## 2021-06-25 RX ORDER — POTASSIUM CHLORIDE 20 MEQ/1
40 TABLET, EXTENDED RELEASE ORAL
Status: COMPLETED | OUTPATIENT
Start: 2021-06-25 | End: 2021-06-25

## 2021-06-25 RX ORDER — WARFARIN SODIUM 2 MG/1
4 TABLET ORAL DAILY
Status: ON HOLD | COMMUNITY
End: 2021-07-06 | Stop reason: HOSPADM

## 2021-06-25 RX ORDER — POTASSIUM CHLORIDE 7.45 MG/ML
10 INJECTION INTRAVENOUS PRN
Status: DISCONTINUED | OUTPATIENT
Start: 2021-06-25 | End: 2021-06-26 | Stop reason: HOSPADM

## 2021-06-25 RX ADMIN — POTASSIUM CHLORIDE 10 MEQ: 10 INJECTION, SOLUTION INTRAVENOUS at 14:51

## 2021-06-25 RX ADMIN — SODIUM CHLORIDE: 9 INJECTION, SOLUTION INTRAVENOUS at 10:32

## 2021-06-25 RX ADMIN — MAGNESIUM SULFATE HEPTAHYDRATE 1000 MG: 1 INJECTION, SOLUTION INTRAVENOUS at 16:56

## 2021-06-25 RX ADMIN — Medication 1 CAPSULE: at 08:07

## 2021-06-25 RX ADMIN — FLECAINIDE ACETATE 50 MG: 50 TABLET ORAL at 08:07

## 2021-06-25 RX ADMIN — POTASSIUM CHLORIDE 40 MEQ: 1500 TABLET, EXTENDED RELEASE ORAL at 12:45

## 2021-06-25 RX ADMIN — METOPROLOL TARTRATE 12.5 MG: 25 TABLET, FILM COATED ORAL at 21:06

## 2021-06-25 RX ADMIN — POTASSIUM CHLORIDE 40 MEQ: 1500 TABLET, EXTENDED RELEASE ORAL at 10:26

## 2021-06-25 RX ADMIN — POTASSIUM CHLORIDE 10 MEQ: 10 INJECTION, SOLUTION INTRAVENOUS at 08:07

## 2021-06-25 RX ADMIN — POTASSIUM CHLORIDE 10 MEQ: 10 INJECTION, SOLUTION INTRAVENOUS at 12:45

## 2021-06-25 RX ADMIN — POTASSIUM CHLORIDE 10 MEQ: 10 INJECTION, SOLUTION INTRAVENOUS at 10:26

## 2021-06-25 RX ADMIN — MAGNESIUM SULFATE HEPTAHYDRATE 1000 MG: 1 INJECTION, SOLUTION INTRAVENOUS at 17:58

## 2021-06-25 RX ADMIN — METOPROLOL TARTRATE 12.5 MG: 25 TABLET, FILM COATED ORAL at 08:07

## 2021-06-25 RX ADMIN — POTASSIUM CHLORIDE AND SODIUM CHLORIDE: 900; 150 INJECTION, SOLUTION INTRAVENOUS at 00:19

## 2021-06-25 RX ADMIN — LEVOTHYROXINE SODIUM 75 MCG: 0.07 TABLET ORAL at 05:48

## 2021-06-25 RX ADMIN — WARFARIN SODIUM 6 MG: 6 TABLET ORAL at 17:55

## 2021-06-25 RX ADMIN — SODIUM CHLORIDE: 9 INJECTION, SOLUTION INTRAVENOUS at 21:07

## 2021-06-25 RX ADMIN — FLECAINIDE ACETATE 50 MG: 50 TABLET ORAL at 21:06

## 2021-06-25 RX ADMIN — PANTOPRAZOLE SODIUM 40 MG: 40 TABLET, DELAYED RELEASE ORAL at 05:48

## 2021-06-25 RX ADMIN — DONEPEZIL HYDROCHLORIDE 10 MG: 10 TABLET, FILM COATED ORAL at 21:06

## 2021-06-25 ASSESSMENT — PAIN SCALES - GENERAL
PAINLEVEL_OUTOF10: 0

## 2021-06-25 NOTE — ED NOTES
Pt assisted onto [facility]. Urine specimen obtained and taken to lab. Pt gowned and placed on cardiac monitor and continuous pulse oximetry and blood pressure monitoring.  Daughter at bedside     Zac Pendleton Guy [facility]  06/24/21 2017

## 2021-06-25 NOTE — PROGRESS NOTES
Hospitalist Progress Note      Name:  Laya Vance /Age/Sex: 1935  (82 y.o. female)   MRN & CSN:  [id number] & [id number] Admission Date/Time: 2021  6:19 PM   Location:   PCP: Elihue Severance, MD         Hospital Day: 2    Assessment and Plan:   Laya Vance is a 82 y.o.  female  who presents with <principal problem not specified>    Suspected gastroenteritis, likely viral  JAZMYN likely due to pre renal azotemia from dehydration. Hypokalemia likely due to diarrhea and decreased p.o. intake  Hypo magnesium likely due to diarrhea  Lactic acidosis: Resolved  History of paroxysmal atrial fibrillation: Currently in normal sinus  Hypothyroidism  Essential hypertension: Controlled    Plan:  Patient presented with diarrhea which has now improved  CT abdomen pelvis did not show any acute finding  Magnesium, potassium being replaced  Continue to monitor electrolytes and replace as needed  Stool sample collection needed for C. difficile and stool pathogen screen pending  Continue flecainide, Coumadin for A. fib  Continue home antihypertensive medications        Diet ADULT DIET; Easy to Chew   DVT Prophylaxis [] Lovenox, []  Heparin, [x] SCDs, [] Ambulation   GI Prophylaxis [x] PPI,  [] H2 Blocker,  [] Carafate,  [] Diet/Tube Feeds   Code Status Full Code   Disposition Patient requires continued admission due to electrolytes derangements needing replacement   MDM [] Low, [] Moderate,[x]  High  Patient's risk as above due to advanced age, multiple comorbidities,     History of Present Illness:     Chief Complaint: <principal problem not specified>  Laya Vance is a 82 y.o.  female  who presents with diarrhea       Ten point ROS reviewed negative, unless as noted above    Objective:        Intake/Output Summary (Last 24 hours) at 2021 0922  Last data filed at 2021 0602  Gross per 24 hour   Intake --   Output 500 ml   Net -500 ml      Vitals:   Vitals:    21 0719   BP: (!) 102/57   Pulse: 58   Resp: 16   Temp: 96.4 °F (35.8 °C)   SpO2: 98%     Physical Exam:   GEN Awake female, sitting upright in bed in no apparent distress. Appears given age. EYES Pupils are equally round. No scleral erythema, discharge, or conjunctivitis. HENT Mucous membranes are moist. Oral pharynx without exudates, no evidence of thrush. NECK Supple, no apparent thyromegaly or masses. RESP Clear to auscultation, no wheezes, rales or rhonchi. Symmetric chest movement while on room air. CARDIO/VASC S1/S2 auscultated. Regular rate without appreciable murmurs, rubs, or gallops. No JVD or carotid bruits. Peripheral pulses equal bilaterally and palpable. No peripheral edema. GI Abdomen is soft without significant tenderness, masses, or guarding. Bowel sounds are normoactive. Rectal exam deferred.  No costovertebral angle tenderness. Normal appearing external genitalia. Wolff catheter is not present. HEME/LYMPH No palpable cervical lymphadenopathy and no hepatosplenomegaly. No petechiae or ecchymoses. MSK No gross joint deformities. SKIN Normal coloration, warm, dry. NEURO Cranial nerves appear grossly intact, normal speech, no lateralizing weakness. PSYCH Awake, alert, oriented x 4. Affect appropriate.     Medications:   Medications:    potassium chloride  40 mEq Oral Q3H (SCHEDULED)    warfarin  4 mg Oral Daily    pantoprazole  40 mg Oral QAM AC    metoprolol tartrate  12.5 mg Oral BID    levothyroxine  75 mcg Oral Daily    [Held by provider] furosemide  40 mg Oral Daily    flecainide  50 mg Oral BID    donepezil  10 mg Oral Nightly    sodium chloride flush  5-40 mL Intravenous 2 times per day    lactobacillus  1 capsule Oral Daily with breakfast      Infusions:    sodium chloride      sodium chloride       PRN Meds: potassium chloride, 10 mEq, PRN  sodium chloride flush, 5-40 mL, PRN  sodium chloride, 25 mL, PRN  ondansetron, 4 mg, Q8H PRN   Or  ondansetron, 4 mg, Q6H PRN  acetaminophen, 650 mg, Q6H PRN   Or  acetaminophen, 650 mg, Q6H PRN  magnesium sulfate, 1,000 mg, PRN          Electronically signed by Salome Cooper MD on 6/25/2021 at 9:22 AM

## 2021-06-25 NOTE — H&P
HISTORY AND PHYSICAL             Name:  Anthony Alanis /Age/Sex: 1935  (82 y.o. female)   MRN & CSN:  [id number] & [id number] Admission Date/Time: 2021  6:19 PM   Location:   PCP: Naresh Smith MD         Chief Complaint     Chief Complaint   Patient presents with    Diarrhea     x one week with belly pain. 4 BMs today-watery. History Obtained From   Patient & daughter: [employer].     History of Present Illness (4 elements)   Anthony Alanis is a 82 y. o. who presents to the hospital PMHx: PAF, cardiac murmur, restless legs, leg cramps, HLD, HTN, hypothyroid. Surgical history, cholecystectomy, thyroidectomy, colon resection secondary to diverticulitis. Anticoagulated: Coumadin  Lives at home alone  H POA: Daughter Annmarie  CODE STATUS full    Patient presented to the emergency department today from home with reports of watery yellowish diarrhea for nearly 10 days. She reports decreased appetite, limited nausea. No vomiting. Negative for fever. Negative for chills. No abdominal pain. Positive fatigue. Negative for recent travel. No known illness exposures. Patient has not received Covid vaccination series. She says that she is not interested. No known weight loss. She had 3 episodes of watery yellow diarrhea 3 days ago, 4 episodes the following day, 3 episodes today. Patient's daughter [employer] checks on her multiple times a week. She does her home POC INR testing. INR yesterday 2021: 1.5 per daughter:  There is been no blood noted in the diarrhea. Again diarrhea was described as being watery yellow. She denies any known medication changes. There is been no dietary changes prior to the onset of symptoms. ED work-up reveals evidence of mild leukocytosis with elevated segs at 66%, JAZMYN secondary to dehydration: BUN is 16, creatinine 1.3, GFR 39. Mild hypokalemia: 2.3, mild hypomagnesium: 1.7. Lactic acidosis at 3.1 secondary to the dehydration and volume loss. Patient is afebrile. Blood pressures borderline soft: 102/72. No tachycardia. EKGs junctional rhythm. There was no stool specimens collected in the ED. A urinalysis negative for ketones, hazy no evidence of UTI. Urine culture in process. CT of the abdomen and pelvis no evidence of acute disease process. No masses. No evidence of colitis, enteritis. No colonic mural wall thickening. Evidence of atherosclerotic disease. Patient was started on IV fluids, potassium replacement and admission was requested. Past Medical History     Past Medical History:   Diagnosis Date    Arthritis     Diverticulitis     Easy bruising 5/10/2016    H/O cardiovascular stress test 4/23/16    Normal stress test. EF 88%    H/O echocardiogram 4/23/16; 10/15/2020    EF 55-60%. Sclerotic aortic valve with mild stenosis. Mild MR & TR. Mild mitral stenosis with a mean gradient of 4mmHg.  Heart murmur 6/26/2018    Mild aortic stenosis.  History of Holter monitoring 6/9/2016    Normal Holter findings suggestive of normal sinus rhythm w/rare complex supraventricular ectopy without any clinically significant arrhythmias.     Hyperlipidemia     Hypertension     Leg cramps     PAF (paroxysmal atrial fibrillation) ([facility] [address] 5/10/2016    Controled on flecainide     Thyroid disease         Past Surgical History     Past Surgical History:   Procedure Laterality Date    ABDOMEN SURGERY      colon surgery, had diverticulitis, had colon resection    CATARACT REMOVAL WITH IMPLANT Right 06/14/2019    by Dr. Faby Mustafa Left 06/28/2019    by Dr. Vivas Records  06/2018    INTRACAPSULAR CATARACT EXTRACTION Right 6/14/2019    EYE CATARACT EMULSIFICATION IOL IMPLANT performed by Taina Sanchez MD at [address] [place] [address] Left 6/28/2019    EYE CATARACT EMULSIFICATION IOL IMPLANT performed by Taina Sanchez MD at [address] [place] [address] REPLACEMENT      elvia knee replacement    JOINT REPLACEMENT      left shoulder replacement    THYROID SURGERY      goiter        Medications Prior to Admission     Prior to Admission medications    Medication Sig Start Date End Date Taking? Authorizing Provider   metoprolol tartrate (LOPRESSOR) 25 MG tablet Take 0.5 tablets by mouth 2 times daily Take one tablet twice daily 5/21/21  Yes Laura Ranulfo Mccann, APRN - CNP   flecainide (TAMBOCOR) 50 MG tablet Take 1 tablet by mouth 2 times daily 9/22/20  Yes Laura Gamez MD   levothyroxine (SYNTHROID) 75 MCG tablet Take 75 mcg by mouth Daily   Yes Historical Provider, MD   donepezil (ARICEPT) 10 MG tablet Take 10 mg by mouth nightly   Yes Historical Provider, MD   baclofen (LIORESAL) 10 MG tablet Take 10 mg by mouth 2 times daily   Yes Historical Provider, MD   furosemide (LASIX) 40 MG tablet 40 mg daily  5/6/16  Yes Historical Provider, MD   omeprazole (PRILOSEC) 40 MG capsule Take 40 mg by mouth daily   Yes Historical Provider, MD   warfarin (COUMADIN) 5 MG tablet Take 1 tablet by mouth daily  Patient taking differently: Take 4 mg by mouth daily  4/24/16   Tran Mata MD        Allergies   Patient has no known allergies. Social History     Social History     Tobacco History     Smoking Status  Never Smoker    Smokeless Tobacco Use  Never Used          Alcohol History     Alcohol Use Status  No          Drug Use     Drug Use Status  No          Sexual Activity     Sexually Active  Never                Family History     Family History   Problem Relation Age of Onset    Cancer Mother     Cancer Brother     Cancer Other        Review of Systems (need 8 systems reviewed)   Review of Systems   Constitutional: Positive for activity change, appetite change and fatigue. Negative for chills, diaphoresis, fever and unexpected weight change. HENT: Negative. Respiratory: Negative. Cardiovascular: Negative. Gastrointestinal: Positive for diarrhea and nausea. Negative for abdominal distention, abdominal pain, blood in stool, constipation and vomiting. Endocrine: Negative for polydipsia, polyphagia and polyuria. Genitourinary: Negative. Musculoskeletal: Negative. Skin: Negative. Allergic/Immunologic: Negative for immunocompromised state. Neurological: Negative. Hematological: Bruises/bleeds easily. Psychiatric/Behavioral: Negative. Ten point ROS reviewed negative, unless as noted above  Physical Exam (need 10 systems)   BP (!) 115/50   Pulse 58   Temp 97.8 °F (36.6 °C) (Oral)   Resp 16   Ht 5' 4\" (1.626 m)   Wt 140 lb 3.2 oz (63.6 kg)   SpO2 100%   BMI 24.07 kg/m²   Physical Exam  Vitals and nursing note reviewed. Constitutional:       General: She is awake. She is not in acute distress. Appearance: She is well-developed and underweight. She is not toxic-appearing or diaphoretic. Comments: Appears stated age, peers chronically ill. HENT:      Head: Normocephalic and atraumatic. Nose: Nose normal.      Mouth/Throat:      Mouth: Mucous membranes are moist.      Pharynx: Oropharynx is clear. Eyes:      Pupils: Pupils are equal, round, and reactive to light. Cardiovascular:      Rate and Rhythm: Normal rate and regular rhythm. Chest Wall: No thrill. Pulses:           Radial pulses are 2+ on the right side and 2+ on the left side. Heart sounds: Murmur heard. Comments: Cardiac murmur: Best heard left sternal border Erb's point. Also heard left midclavicular and mid anterior axillary 2nd-4th intercostal space. Abdominal:      General: Abdomen is flat. Bowel sounds are normal. There is no distension. Palpations: Abdomen is soft. There is no mass. Tenderness: There is no abdominal tenderness. There is no guarding or rebound. Hernia: No hernia is present. Musculoskeletal:         General: Normal range of motion. Cervical back: Normal range of motion and neck supple.       Right lower leg: No edema. Left lower leg: No edema. Skin:     General: Skin is warm and dry. Capillary Refill: Capillary refill takes less than 2 seconds. Coloration: Skin is pale. Skin is not jaundiced. Findings: No rash. Neurological:      General: No focal deficit present. Mental Status: She is alert and oriented to person, place, and time. Psychiatric:         Mood and Affect: Mood normal.         Behavior: Behavior normal. Behavior is cooperative.             Labs      Recent Results (from the past 24 hour(s))   CBC Auto Differential    Collection Time: 06/24/21  6:40 PM   Result Value Ref Range    WBC 12.8 (H) 4.0 - 10.5 K/CU MM    RBC 4.45 4.2 - 5.4 M/CU MM    Hemoglobin 13.5 12.5 - 16.0 GM/DL    Hematocrit 39.7 37 - 47 %    MCV 89.2 78 - 100 FL    MCH 30.3 27 - 31 PG    MCHC 34.0 32.0 - 36.0 %    RDW 14.1 11.7 - 14.9 %    Platelets 384 986 - 479 K/CU MM    MPV 9.5 7.5 - 11.1 FL    Differential Type AUTOMATED DIFFERENTIAL     Segs Relative 66.1 (H) 36 - 66 %    Lymphocytes % 22.1 (L) 24 - 44 %    Monocytes % 9.4 (H) 0 - 4 %    Eosinophils % 1.4 0 - 3 %    Basophils % 0.6 0 - 1 %    Segs Absolute 8.5 K/CU MM    Lymphocytes Absolute 2.8 K/CU MM    Monocytes Absolute 1.2 K/CU MM    Eosinophils Absolute 0.2 K/CU MM    Basophils Absolute 0.1 K/CU MM    Immature Neutrophil % 0.4 0 - 0.43 %    Total Immature Neutrophil 0.05 K/CU MM   CMP    Collection Time: 06/24/21  6:40 PM   Result Value Ref Range    Sodium 133 (L) 135 - 145 MMOL/L    Potassium 2.3 (LL) 3.5 - 5.1 MMOL/L    Chloride 87 (L) 99 - 110 mMol/L    CO2 35 (H) 21 - 32 MMOL/L    BUN 16 6 - 23 MG/DL    CREATININE 1.3 (H) 0.6 - 1.1 MG/DL    Glucose 110 (H) 70 - 99 MG/DL    Calcium 8.0 (L) 8.3 - 10.6 MG/DL    Albumin 3.0 (L) 3.4 - 5.0 GM/DL    Total Protein 6.2 (L) 6.4 - 8.2 GM/DL    Total Bilirubin 0.8 0.0 - 1.0 MG/DL    ALT 8 (L) 10 - 40 U/L    AST 20 15 - 37 IU/L    Alkaline Phosphatase 102 40 - 129 IU/L    GFR Non- 39 (L) >60 mL/min/1.73m2    GFR  47 (L) >60 mL/min/1.73m2    Anion Gap 11 4 - 16   Lactate, Plasma    Collection Time: 06/24/21  6:40 PM   Result Value Ref Range    Lactate 3.1 (HH) 0.4 - 2.0 mMOL/L   Lipase    Collection Time: 06/24/21  6:40 PM   Result Value Ref Range    Lipase 35 13 - 60 IU/L   Urinalysis (Lab)    Collection Time: 06/24/21  8:20 PM   Result Value Ref Range    Color, UA YELLOW YELLOW    Clarity, UA HAZY (A) CLEAR    Glucose, Urine NEGATIVE NEGATIVE MG/DL    Bilirubin Urine NEGATIVE NEGATIVE MG/DL    Ketones, Urine NEGATIVE NEGATIVE MG/DL    Specific Gravity, UA 1.010 1.001 - 1.035    Blood, Urine TRACE (A) NEGATIVE    pH, Urine 7.0 5.0 - 8.0    Protein, UA NEGATIVE NEGATIVE MG/DL    Urobilinogen, Urine NEGATIVE 0.2 - 1.0 MG/DL    Nitrite Urine, Quantitative NEGATIVE NEGATIVE    Leukocyte Esterase, Urine 1+ (A) NEGATIVE    RBC, UA RARE 0 - 6 /HPF    WBC, UA 3 TO 5 0 - 5 /HPF    Epithelial Cells, UA 5 TO 10 /HPF    Bacteria, UA TRACE NEGATIVE /HPF   EKG 12 Lead    Collection Time: 06/24/21  8:37 PM   Result Value Ref Range    Ventricular Rate 54 BPM    Atrial Rate 258 BPM    QRS Duration 90 ms    Q-T Interval 374 ms    QTc Calculation (Bazett) 354 ms    R Axis -43 degrees    T Axis 54 degrees    Diagnosis       Junctional rhythm  Left axis deviation  Minimal voltage criteria for LVH, may be normal variant  Septal infarct , age undetermined  Abnormal ECG  No previous ECGs available     Magnesium    Collection Time: 06/24/21  9:20 PM   Result Value Ref Range    Magnesium 1.7 (L) 1.8 - 2.4 mg/dl        Imaging/Diagnostics Last 24 Hours   CT ABDOMEN PELVIS WO CONTRAST Additional Contrast? None    Result Date: 6/24/2021  EXAMINATION: CT OF THE ABDOMEN AND PELVIS WITHOUT CONTRAST 6/24/2021 8:21 pm TECHNIQUE: CT of the abdomen and pelvis was performed without the administration of intravenous contrast. Multiplanar reformatted images are provided for review.  Dose modulation, iterative reconstruction, and/or weight based adjustment of the mA/kV was utilized to reduce the radiation dose to as low as reasonably achievable. COMPARISON: CT abdomen pelvis May 15, 2008 HISTORY: ORDERING SYSTEM PROVIDED HISTORY: abdominal pain, diarrhea TECHNOLOGIST PROVIDED HISTORY: Reason for exam:->abdominal pain, diarrhea Additional Contrast?->None Additional signs and symptoms: mid abd pain diarrhea FINDINGS: Lower Chest:  Visualized portion of the lower chest demonstrates no acute abnormality. Dense calcification of mitral.  Calcification of the aortic. Partially imaged coronary artery disease. Organs: Evaluation of the solid organs is limited due to lack of intravenous contrast.  The nonenhanced liver demonstrates no acute findings. The gallbladder is surgically absent. The nonenhanced spleen, pancreas, and bilateral adrenal glands demonstrate no acute abnormality. The kidneys enhance symmetrically. No hydronephrosis or obstructive uropathy identified. GI/Bowel: No bowel obstruction evident. Colonic diverticulosis without CT evidence of diverticulitis. Small bowel is normal in caliber. Pelvis: The bladder and solid pelvic organs demonstrate no acute findings. Peritoneum/Retroperitoneum: The abdominal aorta is normal in caliber. Moderate to severe calcified atherosclerotic plaque throughout. No retroperitoneal adenopathy. No free fluid or free air identified within the abdomen and pelvis. Bones/Soft Tissues: No acute osseous abnormality. Postoperative changes of right total hip arthroplasty. Severe advanced degenerative changes of the left hip. Multilevel moderate to severe degenerative changes of the imaged spine. Osseous mineralization is decreased. 1. No acute intra-abdominal process identified. 2. Colonic diverticulosis without CT evidence of diverticulitis. 3. No bowel obstruction. 4. Severe atherosclerotic disease.        Assessment      Hospital Problems         Last Modified POA    JAZMYN (acute kidney injury) ([facility] [address] 6/24/2021 Yes    Hypokalemia 6/24/2021 Yes    Diarrhea 6/24/2021 Yes    Lactic acidosis 6/24/2021 Yes    Hypomagnesemia 6/24/2021 Yes          No problem-specific Assessment & Plan notes found for this encounter. Plan    1. JAZMYN: 2/2-> diarrhea, fluid volume loss: BUN 16, creatinine 1.3, GFR 39 assoc. W/ mild metabolic acidosis: CO2 35 AG remains 11. Will likely recorrect with IVF. Continue to monitor, recheck a.m. labs. Strict I & O.   2. Diarrhea: ?  Functional, parasitic, viral, bacterial.  Less likely bacterial as there is no evidence of fever, CT the abdomen and pelvis does not indicate any evidence consistent with colitis, enteritis or C. Difficile. WBC however is mildly elevated 12.8 with a left shift 66. This may very well be reactive and not necessarily bacterial.  Stool specimens for testing: C. difficile, ova and parasite, Giardia, leukocytes, etc.  Probiotics started. 3. Hypokalemia: 2.3. 2/2-> diarrhea, poor p.o. intake. IVF: Normal saline with 20 mEq KCl for repletion. Monitor and recheck, replete as warranted. 4. Hypomagnesia: 1.7. -> 2/2-> same as above. Magnesium repletion protocol. Recheck mag level in a.m.  5. Lactic acidosis: 3.1-> will trend and monitor. Likely secondary to diarrhea, volume loss, dehydration/JAZMYN. No indication for antibiotic therapy at this time. No evidence that is consistent with a bacterial septic process. Procalcitonin in a.m. urine culture in process. Stool specimens will be obtained and processed. 6. Weight loss: May 2021 weight 146 pounds, weight today in the emergency department 140 lbs. 2/2-> diarrhea x10 days, decreased p.o. intake. Continue to monitor, advance diet as tolerated  7. History of paroxysmal atrial fibrillation: Continue flecainide, metoprolol, Coumadin therapy. INR in a.m.   EKG consistent with a junctional rhythm rate 54.  8. History of hypertension: Currently blood pressure is soft but stable: Continue to monitor. Continue beta-blocker therapy as well. Continuing beta-blocker therapy. Will hold Lasix due to current volume loss but continue to monitor pressure. There is no evidence of active A. fib, fluid overload. 9. Hypothyroid: Continue Synthroid per home dosing. TSH in a.m.     Diet ADULT DIET; Easy to Chew   DVT Prophylaxis [] Lovenox, []  Heparin, [] SCDs, [] Ambulation  COUMADIN   GI Prophylaxis [] PPI,  [] H2 Blocker,  [] Carafate,  [] Diet/Tube Feeds   Code Status Full Code   Surrogate Decision Dean Stein, pt daughter   Disposition Patient requires continued admission due to          Consultations Ordered:  None    Medications   Medications:    warfarin  4 mg Oral Daily    [START ON 6/25/2021] pantoprazole  40 mg Oral QAM AC    metoprolol tartrate  12.5 mg Oral BID    [START ON 6/25/2021] levothyroxine  75 mcg Oral Daily    [START ON 6/25/2021] furosemide  40 mg Oral Daily    flecainide  50 mg Oral BID    donepezil  10 mg Oral Nightly    sodium chloride flush  5-40 mL Intravenous 2 times per day    [START ON 6/25/2021] lactobacillus  1 capsule Oral Daily with breakfast    0.9% NaCl with KCl 20 mEq   Intravenous Once      Infusions:    sodium chloride 1,000 mL (06/24/21 2033)    sodium chloride       PRN Meds: sodium chloride flush, 5-40 mL, PRN  sodium chloride, 25 mL, PRN  ondansetron, 4 mg, Q8H PRN   Or  ondansetron, 4 mg, Q6H PRN  acetaminophen, 650 mg, Q6H PRN   Or  acetaminophen, 650 mg, Q6H PRN  magnesium sulfate, 1,000 mg, PRN            Electronically signed by GAUDENCIO Atkinson CNP on 6/24/21 at

## 2021-06-25 NOTE — CARE COORDINATION
CM met with the patient for discharge planning. Patient lives alone in her apartment, has insurance with Rx coverage & PCP, stated that she was independent with ADL's prior to admission, and no longer drives (daughter provides transportation as needed). Patient stated that her daughter lives across the street from her apartment complex. Patient stated that she uses a walker at home and does not require home oxygen. Patient stated that she plans to return home upon discharge and is unable to identify any needs at this time. CM available if needs arise.

## 2021-06-25 NOTE — PROGRESS NOTES
Clinical Pharmacy Note       Warfarin Consult    Consult received from Dr. Pari Jackson to manage Warfarin therapy. Subjective: Thuan Aleman is a 82 y.o. female ordered Warfarin. Patient Active Problem List   Diagnosis    Nocturnal leg cramps    Restless leg syndrome, controlled    HTN (hypertension)    Hypothyroid    Vertigo    Acquired hypothyroidism    Paroxysmal atrial fibrillation (HCC)    Encounter for monitoring flecainide therapy    Easy bruising    Heart murmur    Aftercare following right hip joint replacement surgery    Primary osteoarthritis of right hip    Age-related nuclear cataract of right eye    Age-related nuclear cataract of left eye    JAZMYN (acute kidney injury) ([address]    Hypokalemia    Diarrhea    Lactic acidosis    Hypomagnesemia     Allergies: Patient has no known allergies. Home dose: 4mg daily  Current dose: 4mg daily  Indication: atrial fibrillation  INR goal: 2-3    Objective:  INR Results:  INR   Date Value Ref Range Status   06/25/2021 1.73 INDEX Final     Comment:     THERAPEUTIC RANGE:  INDICATIONS: INR 2.0-3.0  Most (DVT, PE,  Atrial Fibillation, Bioprosthetic valve,   [place] Judes bicuspid aortic valve)  INDICATIONS: 2.5-3.5 Most mechanical valves  recurrent thrombosis. 06/24/2021 1.73 INDEX Final     Comment:     THERAPEUTIC RANGE:  INDICATIONS: INR 2.0-3.0  Most (DVT, PE,  Atrial Fibillation, Bioprosthetic valve,   [place] Judes bicuspid aortic valve)  INDICATIONS: 2.5-3.5 Most mechanical valves  recurrent thrombosis. 01/23/2020 2.29 INDEX Final     Comment:     THERAPEUTIC RANGE:  INDICATIONS: INR 2.0-3.0  Most (DVT, PE,  Atrial Fibillation, Bioprosthetic valve,   St Judes bicuspid aortic valve)  INDICATIONS: 2.5-3.5 Most mechanical valves  recurrent thrombosis. Recent Labs     06/24/21  1840 06/25/21  0550   HGB 13.5 11.9*   HCT 39.7 35.7*    276       Other anticoagulants: none    Assessment: INR subtherapeutic today at 1.73.   Give one time booster dose

## 2021-06-25 NOTE — PROGRESS NOTES
Comprehensive Nutrition Assessment    Type and Reason for Visit:  Initial, Positive Nutrition Screen    Nutrition Recommendations/Plan: start clear liquid supplement tid with meals    Nutrition Assessment:  Pt admitted for hypokalemia, possible gastroenteritis with loose stools for approx. 3 days. Intakes prior to admit poor with noted weight change 5-6# in the past months. No chewing or swallowing issues. Malnutrition Assessment:  Malnutrition Status:  Insufficient data    Context:  Acute Illness     Findings of the 6 clinical characteristics of malnutrition:      Estimated Daily Nutrient Needs:  Energy (kcal):  6122-4835; Weight Used for Energy Requirements:  Current     Protein (g):  64-76; Weight Used for Protein Requirements:  Current (1-1.2)        Fluid (ml/day):  9566-4402; Method Used for Fluid Requirements:  1 ml/kcal      Nutrition Related Findings:  labs reviewed, Na 134, K 2.3L      Wounds:  None       Current Nutrition Therapies:    ADULT DIET; Easy to Chew    Anthropometric Measures:  · Height: 5' 4\" (162.6 cm)  · Current Body Weight: 140 lb (63.5 kg)   · Admission Body Weight: 140 lb (63.5 kg)    · Usual Body Weight: 145 lb (65.8 kg) (prior encounter in May)     · Ideal Body Weight: 120 lbs; % Ideal Body Weight 116.7 %   · BMI: 24  · Adjusted Body Weight:  ; No Adjustment    · BMI Categories: Normal Weight (BMI 18.5-24. 9)       Nutrition Diagnosis:   · Predicted inadequate energy intake, In context of acute illness or injury, Unintended weight loss related to altered GI function, acute injury/trauma as evidenced by intake 26-50%, nausea, diarrhea, GI abnormality, lab values      Nutrition Interventions:   Food and/or Nutrient Delivery:  Continue Current Diet, Start Oral Nutrition Supplement  Nutrition Education/Counseling:  Counseling not indicated   Coordination of Nutrition Care:  Coordination of Community Care, Continue to monitor while inpatient    Goals:  Oral intakes will improve to % of most meals and supplements       Nutrition Monitoring and Evaluation:   Behavioral-Environmental Outcomes:  None Identified   Food/Nutrient Intake Outcomes:  Food and Nutrient Intake, Supplement Intake  Physical Signs/Symptoms Outcomes:  Weight, Biochemical Data, Meal Time Behavior, Diarrhea     Discharge Planning:     Too soon to determine     Electronically signed by Dorys Randall RD, LD on 6/25/21 at 1:28 PM EDT    Contact: [phone]

## 2021-06-26 VITALS
TEMPERATURE: 96.6 F | OXYGEN SATURATION: 96 % | SYSTOLIC BLOOD PRESSURE: 143 MMHG | BODY MASS INDEX: 25.9 KG/M2 | RESPIRATION RATE: 16 BRPM | DIASTOLIC BLOOD PRESSURE: 65 MMHG | HEIGHT: 64 IN | WEIGHT: 151.7 LBS | HEART RATE: 65 BPM

## 2021-06-26 LAB
ANION GAP SERPL CALCULATED.3IONS-SCNC: 2 MMOL/L (ref 4–16)
BUN BLDV-MCNC: 12 MG/DL (ref 6–23)
CALCIUM SERPL-MCNC: 7.5 MG/DL (ref 8.3–10.6)
CHLORIDE BLD-SCNC: 105 MMOL/L (ref 99–110)
CLOSTRIDIUM DIFFICILE, PCR: NORMAL
CO2: 32 MMOL/L (ref 21–32)
CREAT SERPL-MCNC: 1.1 MG/DL (ref 0.6–1.1)
GFR AFRICAN AMERICAN: 57 ML/MIN/1.73M2
GFR NON-AFRICAN AMERICAN: 47 ML/MIN/1.73M2
GLUCOSE BLD-MCNC: 124 MG/DL (ref 70–99)
INR BLD: 1.62 INDEX
MAGNESIUM: 2.1 MG/DL (ref 1.8–2.4)
PHOSPHORUS: 1.7 MG/DL (ref 2.5–4.9)
POTASSIUM SERPL-SCNC: 3.1 MMOL/L (ref 3.5–5.1)
PROTHROMBIN TIME: 18.6 SECONDS (ref 11.7–14.5)
SODIUM BLD-SCNC: 139 MMOL/L (ref 135–145)

## 2021-06-26 PROCEDURE — 80048 BASIC METABOLIC PNL TOTAL CA: CPT

## 2021-06-26 PROCEDURE — G0378 HOSPITAL OBSERVATION PER HR: HCPCS

## 2021-06-26 PROCEDURE — 96361 HYDRATE IV INFUSION ADD-ON: CPT

## 2021-06-26 PROCEDURE — 84100 ASSAY OF PHOSPHORUS: CPT

## 2021-06-26 PROCEDURE — 6370000000 HC RX 637 (ALT 250 FOR IP): Performed by: NURSE PRACTITIONER

## 2021-06-26 PROCEDURE — 94761 N-INVAS EAR/PLS OXIMETRY MLT: CPT

## 2021-06-26 PROCEDURE — 85610 PROTHROMBIN TIME: CPT

## 2021-06-26 PROCEDURE — 83735 ASSAY OF MAGNESIUM: CPT

## 2021-06-26 PROCEDURE — 36415 COLL VENOUS BLD VENIPUNCTURE: CPT

## 2021-06-26 PROCEDURE — 2580000003 HC RX 258: Performed by: NURSE PRACTITIONER

## 2021-06-26 RX ORDER — WARFARIN SODIUM 4 MG/1
4 TABLET ORAL DAILY
Status: DISCONTINUED | OUTPATIENT
Start: 2021-06-27 | End: 2021-06-26 | Stop reason: HOSPADM

## 2021-06-26 RX ORDER — WARFARIN SODIUM 6 MG/1
6 TABLET ORAL
Status: DISCONTINUED | OUTPATIENT
Start: 2021-06-26 | End: 2021-06-26 | Stop reason: HOSPADM

## 2021-06-26 RX ADMIN — METOPROLOL TARTRATE 12.5 MG: 25 TABLET, FILM COATED ORAL at 08:58

## 2021-06-26 RX ADMIN — FLECAINIDE ACETATE 50 MG: 50 TABLET ORAL at 08:57

## 2021-06-26 RX ADMIN — PANTOPRAZOLE SODIUM 40 MG: 40 TABLET, DELAYED RELEASE ORAL at 06:30

## 2021-06-26 RX ADMIN — SODIUM CHLORIDE: 9 INJECTION, SOLUTION INTRAVENOUS at 07:25

## 2021-06-26 RX ADMIN — LEVOTHYROXINE SODIUM 75 MCG: 0.07 TABLET ORAL at 06:30

## 2021-06-26 RX ADMIN — SODIUM CHLORIDE, PRESERVATIVE FREE 10 ML: 5 INJECTION INTRAVENOUS at 08:57

## 2021-06-26 RX ADMIN — Medication 1 CAPSULE: at 08:58

## 2021-06-26 ASSESSMENT — PAIN SCALES - GENERAL
PAINLEVEL_OUTOF10: 0

## 2021-06-26 NOTE — PROGRESS NOTES
Clinical Pharmacy Note       Warfarin Consult    Consult received from Dr. Medhat Peterson to manage Warfarin therapy. Subjective: Yair Rangel is a 82 y.o. female ordered Warfarin. Patient Active Problem List   Diagnosis    Nocturnal leg cramps    Restless leg syndrome, controlled    HTN (hypertension)    Hypothyroid    Vertigo    Acquired hypothyroidism    Paroxysmal atrial fibrillation (HCC)    Encounter for monitoring flecainide therapy    Easy bruising    Heart murmur    Aftercare following right hip joint replacement surgery    Primary osteoarthritis of right hip    Age-related nuclear cataract of right eye    Age-related nuclear cataract of left eye    JAZMYN (acute kidney injury) ([address]    Hypokalemia    Diarrhea    Lactic acidosis    Hypomagnesemia     Allergies: Patient has no known allergies. Home dose: 4mg daily  Current dose: 6mg  Indication: Atrial fibrillation   INR goal: 2-3    Objective:  INR Results:  INR   Date Value Ref Range Status   06/26/2021 1.62 INDEX Final     Comment:     THERAPEUTIC RANGE:  INDICATIONS: INR 2.0-3.0  Most (DVT, PE,  Atrial Fibillation, Bioprosthetic valve,   St Judes bicuspid aortic valve)  INDICATIONS: 2.5-3.5 Most mechanical valves  recurrent thrombosis. 06/25/2021 1.73 INDEX Final     Comment:     THERAPEUTIC RANGE:  INDICATIONS: INR 2.0-3.0  Most (DVT, PE,  Atrial Fibillation, Bioprosthetic valve,   [place] Judes bicuspid aortic valve)  INDICATIONS: 2.5-3.5 Most mechanical valves  recurrent thrombosis. 06/24/2021 1.73 INDEX Final     Comment:     THERAPEUTIC RANGE:  INDICATIONS: INR 2.0-3.0  Most (DVT, PE,  Atrial Fibillation, Bioprosthetic valve,   St Judes bicuspid aortic valve)  INDICATIONS: 2.5-3.5 Most mechanical valves  recurrent thrombosis. Recent Labs     06/24/21  1840 06/24/21  1840 06/25/21  0550   HGB 13.5   < > 11.9*   HCT 39.7   < > 35.7*     --  276    < > = values in this interval not displayed.        Other anticoagulants: none    Assessment: INR remains subtherapeutic today at 1.62. Give additional one time booster dose then resume home dose tomorrow. Daily INR ordered? yes    Plan: Warfarin 6mg tonight. Thank you for the consult. Pharmacy will continue to follow.     Noelle Fajardo [facility] PharmD, [facility] 6/26/2021 at 10:41 AM

## 2021-06-26 NOTE — DISCHARGE SUMMARY
Discharge Summary    Name:  Lula Falcon /Age/Sex: 1935  (82 y.o. female)   MRN & CSN:  [id number] & [id number] Admission Date/Time: 2021  6:19 PM   Attending:  Antonio Nguyen MD Discharging Physician: Antonio Nguyen MD     Hospital Course:   Lula Falcon is a 82 y.o.  female  who presents with <principal problem not specified>    Patient is an 42-year-old female past medical history of paroxysmal atrial fibrillation, anticoagulated with Coumadin, hypothyroidism, hypertension, who was admitted for diarrhea, acute kidney injury and dehydration. Patient was initially started on IV fluids and her potassium, magnesium and phosphorus were replaced. Throughout hospitalization, patient has 1 semisolid bowel movement. She did not have any diarrheal episodes. Patient reported significant improvement in her symptoms. Acute kidney injury has resolved. Since patient has been for the most of hospitalization and since her clinical symptoms have resolved, she will be discharged to home in stable condition. She was advised to follow-up with PCP for transition of care. She was also advised to continue her Coumadin dose and follow-up with PCP for INR. The patient expressed appropriate understanding of and agreement with the discharge recommendations, medications, and plan.      Consults this admission:  [facility]    Discharge Instruction:   Follow up appointments:   Primary care physician:  within 2 weeks    Diet:  regular diet   Activity: activity as tolerated  Disposition: Discharged to:   [x]Home, [][facility], []SNF, []Acute Rehab, []Hospice   Condition on discharge: Stable    Discharge Medications:      [address]io Valenzuel[address] Dr Medication Instructions ABRAHAM:[id number]    Printed on:21 1045   Medication Information                      baclofen (LIORESAL) 10 MG tablet  Take 10 mg by mouth 2 times daily             donepezil (ARICEPT) 10 MG tablet  Take 10 mg by mouth nightly flecainide (TAMBOCOR) 50 MG tablet  Take 1 tablet by mouth 2 times daily             furosemide (LASIX) 40 MG tablet  Take 40 mg by mouth daily              levothyroxine (SYNTHROID) 75 MCG tablet  Take 75 mcg by mouth Daily             metoprolol tartrate (LOPRESSOR) 25 MG tablet  Take 12.5 mg by mouth 2 times daily             omeprazole (PRILOSEC) 40 MG capsule  Take 40 mg by mouth daily             warfarin (COUMADIN) 2 MG tablet  Take 4 mg by mouth daily             warfarin (COUMADIN) 5 MG tablet  Take 1 tablet by mouth daily                 Objective Findings at Discharge:   BP (!) 143/65   Pulse 65   Temp 96.6 °F (35.9 °C) (Infrared)   Resp 16   Ht 5' 4\" (1.626 m)   Wt 151 lb 11.2 oz (68.8 kg)   SpO2 96%   BMI 26.04 kg/m²            PHYSICAL EXAM   GEN Awake female, sitting upright in bed in no apparent distress. Appears given age. EYES Pupils are equally round. No scleral erythema, discharge, or conjunctivitis. HENT Mucous membranes are moist. Oral pharynx without exudates, no evidence of thrush. NECK Supple, no apparent thyromegaly or masses. RESP Clear to auscultation, no wheezes, rales or rhonchi. Symmetric chest movement while on room air. CARDIO/VASC S1/S2 auscultated. Regular rate without appreciable murmurs, rubs, or gallops. No JVD or carotid bruits. Peripheral pulses equal bilaterally and palpable. No peripheral edema. GI Abdomen is soft without significant tenderness, masses, or guarding. Bowel sounds are normoactive. Rectal exam deferred.  No costovertebral angle tenderness. Normal appearing external genitalia. Wolff catheter is not present. HEME/LYMPH No palpable cervical lymphadenopathy and no hepatosplenomegaly. No petechiae or ecchymoses. MSK No gross joint deformities. SKIN Normal coloration, warm, dry. NEURO Cranial nerves appear grossly intact, normal speech, no lateralizing weakness. PSYCH Awake, alert, oriented x 4. Affect appropriate.     BMP/CBC  Recent Labs 06/24/21  1840 06/24/21  1840 06/25/21  0550 06/25/21  0550 06/25/21  0800 06/25/21  1700 06/26/21  0920   *   < > 134*  --  136  --  139   K 2.3*   < > 2.3*   < > K+ 2.5 CALLED AND RB TO DBEVER RN [id number] 3.5 3.1*   CL 87*   < > 95*  --  97*  --  105   CO2 35*   < > 39*  --  24  --  32   BUN 16   < > 13  --  12  --  12   CREATININE 1.3*   < > 1.1  --  1.1  --  1.1   WBC 12.8*  --  9.3  --   --   --   --    HCT 39.7  --  35.7*  --   --   --   --      --  276  --   --   --   --     < > = values in this interval not displayed.        IMAGING:      Discharge Time of 35minutes    Electronically signed by Lux Leahy MD on 6/26/2021 at 10:48 AM

## 2021-06-26 NOTE — PROGRESS NOTES
Discharge instructions given. No questions at this time. No new sin issues noted. To front door per w/c

## 2021-06-27 LAB
CRYPTOSPORIDIUM ANTIGEN: NEGATIVE
CULTURE: ABNORMAL
GIARDIA ANTIGEN STOOL: NEGATIVE
LACTOFERRIN, QUAL: POSITIVE
Lab: ABNORMAL
ROTAVIRUS ANTIGEN: NEGATIVE
SPECIMEN: ABNORMAL

## 2021-07-03 ENCOUNTER — APPOINTMENT (OUTPATIENT)
Dept: CT IMAGING | Age: 86
End: 2021-07-03
Payer: MEDICARE

## 2021-07-03 ENCOUNTER — HOSPITAL ENCOUNTER (OUTPATIENT)
Age: 86
Setting detail: OBSERVATION
Discharge: HOME | End: 2021-07-06
Attending: EMERGENCY MEDICINE | Admitting: INTERNAL MEDICINE
Payer: MEDICARE

## 2021-07-03 DIAGNOSIS — R19.7 DIARRHEA, UNSPECIFIED TYPE: Primary | ICD-10-CM

## 2021-07-03 DIAGNOSIS — R79.89 ELEVATED LACTIC ACID LEVEL: ICD-10-CM

## 2021-07-03 DIAGNOSIS — R53.1 GENERALIZED WEAKNESS: ICD-10-CM

## 2021-07-03 PROBLEM — Z79.01 CHRONIC ANTICOAGULATION: Status: ACTIVE | Noted: 2021-07-03

## 2021-07-03 PROBLEM — R29.898 SEVERE MUSCLE DECONDITIONING: Status: ACTIVE | Noted: 2021-07-03

## 2021-07-03 PROBLEM — T50.905A BRADYCARDIA, DRUG INDUCED: Status: ACTIVE | Noted: 2021-07-03

## 2021-07-03 PROBLEM — R00.1 BRADYCARDIA, DRUG INDUCED: Status: ACTIVE | Noted: 2021-07-03

## 2021-07-03 LAB
ALBUMIN SERPL-MCNC: 3.2 GM/DL (ref 3.4–5)
ALP BLD-CCNC: 79 IU/L (ref 40–129)
ALT SERPL-CCNC: 12 U/L (ref 10–40)
ANION GAP SERPL CALCULATED.3IONS-SCNC: 12 MMOL/L (ref 4–16)
AST SERPL-CCNC: 24 IU/L (ref 15–37)
BACTERIA: ABNORMAL /HPF
BASOPHILS ABSOLUTE: 0.1 K/CU MM
BASOPHILS RELATIVE PERCENT: 0.9 % (ref 0–1)
BILIRUB SERPL-MCNC: 0.4 MG/DL (ref 0–1)
BILIRUBIN URINE: NEGATIVE MG/DL
BLOOD, URINE: NEGATIVE
BUN BLDV-MCNC: 11 MG/DL (ref 6–23)
CALCIUM SERPL-MCNC: 8.6 MG/DL (ref 8.3–10.6)
CAST TYPE: NEGATIVE /HPF
CHLORIDE BLD-SCNC: 100 MMOL/L (ref 99–110)
CLARITY: ABNORMAL
CO2: 29 MMOL/L (ref 21–32)
COLOR: YELLOW
CREAT SERPL-MCNC: 1 MG/DL (ref 0.6–1.1)
CRYSTAL TYPE: NEGATIVE /HPF
DIFFERENTIAL TYPE: ABNORMAL
EOSINOPHILS ABSOLUTE: 0.4 K/CU MM
EOSINOPHILS RELATIVE PERCENT: 4.4 % (ref 0–3)
EPITHELIAL CELLS, UA: ABNORMAL /HPF
GFR AFRICAN AMERICAN: >60 ML/MIN/1.73M2
GFR NON-AFRICAN AMERICAN: 53 ML/MIN/1.73M2
GLUCOSE BLD-MCNC: 99 MG/DL (ref 70–99)
GLUCOSE, URINE: NEGATIVE MG/DL
HCT VFR BLD CALC: 40.8 % (ref 37–47)
HEMOGLOBIN: 13 GM/DL (ref 12.5–16)
IMMATURE NEUTROPHIL %: 0.3 % (ref 0–0.43)
KETONES, URINE: NEGATIVE MG/DL
LACTATE: NORMAL MMOL/L (ref 0.4–2)
LEUKOCYTE ESTERASE, URINE: NEGATIVE
LIPASE: 30 IU/L (ref 13–60)
LYMPHOCYTES ABSOLUTE: 2.5 K/CU MM
LYMPHOCYTES RELATIVE PERCENT: 27.2 % (ref 24–44)
MAGNESIUM: 1.9 MG/DL (ref 1.8–2.4)
MCH RBC QN AUTO: 30.3 PG (ref 27–31)
MCHC RBC AUTO-ENTMCNC: 31.9 % (ref 32–36)
MCV RBC AUTO: 95.1 FL (ref 78–100)
MONOCYTES ABSOLUTE: 0.8 K/CU MM
MONOCYTES RELATIVE PERCENT: 8.9 % (ref 0–4)
NITRITE URINE, QUANTITATIVE: NEGATIVE
PDW BLD-RTO: 15.6 % (ref 11.7–14.9)
PH, URINE: 7 (ref 5–8)
PHOSPHORUS: 3 MG/DL (ref 2.5–4.9)
PLATELET # BLD: 347 K/CU MM (ref 140–440)
PMV BLD AUTO: 9.6 FL (ref 7.5–11.1)
POTASSIUM SERPL-SCNC: 4.3 MMOL/L (ref 3.5–5.1)
PROTEIN UA: NEGATIVE MG/DL
RBC # BLD: 4.29 M/CU MM (ref 4.2–5.4)
RBC URINE: NEGATIVE /HPF (ref 0–6)
SEGMENTED NEUTROPHILS ABSOLUTE COUNT: 5.3 K/CU MM
SEGMENTED NEUTROPHILS RELATIVE PERCENT: 58.3 % (ref 36–66)
SODIUM BLD-SCNC: 141 MMOL/L (ref 135–145)
SPECIFIC GRAVITY UA: 1.01 (ref 1–1.03)
TOTAL IMMATURE NEUTOROPHIL: 0.03 K/CU MM
TOTAL PROTEIN: 6.1 GM/DL (ref 6.4–8.2)
UROBILINOGEN, URINE: 0.2 MG/DL (ref 0.2–1)
WBC # BLD: 9.1 K/CU MM (ref 4–10.5)
WBC UA: NEGATIVE /HPF (ref 0–5)

## 2021-07-03 PROCEDURE — 6360000004 HC RX CONTRAST MEDICATION: Performed by: EMERGENCY MEDICINE

## 2021-07-03 PROCEDURE — 99220 PR INITIAL OBSERVATION CARE/DAY 70 MINUTES: CPT | Performed by: INTERNAL MEDICINE

## 2021-07-03 PROCEDURE — 80053 COMPREHEN METABOLIC PANEL: CPT

## 2021-07-03 PROCEDURE — G0378 HOSPITAL OBSERVATION PER HR: HCPCS

## 2021-07-03 PROCEDURE — 96361 HYDRATE IV INFUSION ADD-ON: CPT

## 2021-07-03 PROCEDURE — 83605 ASSAY OF LACTIC ACID: CPT

## 2021-07-03 PROCEDURE — 74177 CT ABD & PELVIS W/CONTRAST: CPT

## 2021-07-03 PROCEDURE — 6370000000 HC RX 637 (ALT 250 FOR IP): Performed by: INTERNAL MEDICINE

## 2021-07-03 PROCEDURE — 83735 ASSAY OF MAGNESIUM: CPT

## 2021-07-03 PROCEDURE — 96360 HYDRATION IV INFUSION INIT: CPT

## 2021-07-03 PROCEDURE — 83690 ASSAY OF LIPASE: CPT

## 2021-07-03 PROCEDURE — 36415 COLL VENOUS BLD VENIPUNCTURE: CPT

## 2021-07-03 PROCEDURE — 85025 COMPLETE CBC W/AUTO DIFF WBC: CPT

## 2021-07-03 PROCEDURE — 99285 EMERGENCY DEPT VISIT HI MDM: CPT

## 2021-07-03 PROCEDURE — 2580000003 HC RX 258: Performed by: EMERGENCY MEDICINE

## 2021-07-03 PROCEDURE — 81001 URINALYSIS AUTO W/SCOPE: CPT

## 2021-07-03 PROCEDURE — 84100 ASSAY OF PHOSPHORUS: CPT

## 2021-07-03 PROCEDURE — 2580000003 HC RX 258: Performed by: INTERNAL MEDICINE

## 2021-07-03 PROCEDURE — 87507 IADNA-DNA/RNA PROBE TQ 12-25: CPT

## 2021-07-03 RX ORDER — SODIUM CHLORIDE 9 MG/ML
INJECTION, SOLUTION INTRAVENOUS CONTINUOUS
Status: DISCONTINUED | OUTPATIENT
Start: 2021-07-03 | End: 2021-07-06

## 2021-07-03 RX ORDER — POTASSIUM CHLORIDE 20 MEQ/1
20 TABLET, EXTENDED RELEASE ORAL DAILY
Status: ON HOLD | COMMUNITY
Start: 2021-06-29 | End: 2021-07-06 | Stop reason: SDUPTHER

## 2021-07-03 RX ORDER — 0.9 % SODIUM CHLORIDE 0.9 %
500 INTRAVENOUS SOLUTION INTRAVENOUS ONCE
Status: COMPLETED | OUTPATIENT
Start: 2021-07-03 | End: 2021-07-03

## 2021-07-03 RX ORDER — WARFARIN SODIUM 5 MG/1
5 TABLET ORAL DAILY
Status: DISCONTINUED | OUTPATIENT
Start: 2021-07-03 | End: 2021-07-06 | Stop reason: DRUGHIGH

## 2021-07-03 RX ORDER — POTASSIUM CHLORIDE 20 MEQ/1
20 TABLET, EXTENDED RELEASE ORAL DAILY
Status: DISCONTINUED | OUTPATIENT
Start: 2021-07-03 | End: 2021-07-06 | Stop reason: HOSPADM

## 2021-07-03 RX ORDER — BACLOFEN 10 MG/1
10 TABLET ORAL 2 TIMES DAILY
Status: DISCONTINUED | OUTPATIENT
Start: 2021-07-03 | End: 2021-07-06 | Stop reason: HOSPADM

## 2021-07-03 RX ORDER — ONDANSETRON 2 MG/ML
4 INJECTION INTRAMUSCULAR; INTRAVENOUS EVERY 6 HOURS PRN
Status: DISCONTINUED | OUTPATIENT
Start: 2021-07-03 | End: 2021-07-06 | Stop reason: HOSPADM

## 2021-07-03 RX ORDER — SODIUM CHLORIDE 9 MG/ML
25 INJECTION, SOLUTION INTRAVENOUS PRN
Status: DISCONTINUED | OUTPATIENT
Start: 2021-07-03 | End: 2021-07-06 | Stop reason: HOSPADM

## 2021-07-03 RX ORDER — DONEPEZIL HYDROCHLORIDE 10 MG/1
10 TABLET, FILM COATED ORAL NIGHTLY
Status: DISCONTINUED | OUTPATIENT
Start: 2021-07-03 | End: 2021-07-06 | Stop reason: HOSPADM

## 2021-07-03 RX ORDER — FLECAINIDE ACETATE 50 MG/1
50 TABLET ORAL 2 TIMES DAILY
Status: DISCONTINUED | OUTPATIENT
Start: 2021-07-03 | End: 2021-07-06 | Stop reason: HOSPADM

## 2021-07-03 RX ORDER — MAGNESIUM SULFATE IN WATER 40 MG/ML
2000 INJECTION, SOLUTION INTRAVENOUS PRN
Status: DISCONTINUED | OUTPATIENT
Start: 2021-07-03 | End: 2021-07-06 | Stop reason: HOSPADM

## 2021-07-03 RX ORDER — POTASSIUM CHLORIDE 7.45 MG/ML
10 INJECTION INTRAVENOUS PRN
Status: DISCONTINUED | OUTPATIENT
Start: 2021-07-03 | End: 2021-07-06 | Stop reason: HOSPADM

## 2021-07-03 RX ORDER — WARFARIN SODIUM 4 MG/1
4 TABLET ORAL DAILY
Status: DISCONTINUED | OUTPATIENT
Start: 2021-07-03 | End: 2021-07-03

## 2021-07-03 RX ORDER — LEVOTHYROXINE SODIUM 0.07 MG/1
75 TABLET ORAL DAILY
Status: DISCONTINUED | OUTPATIENT
Start: 2021-07-04 | End: 2021-07-06 | Stop reason: HOSPADM

## 2021-07-03 RX ORDER — POLYETHYLENE GLYCOL 3350 17 G/17G
17 POWDER, FOR SOLUTION ORAL DAILY PRN
Status: DISCONTINUED | OUTPATIENT
Start: 2021-07-03 | End: 2021-07-06 | Stop reason: HOSPADM

## 2021-07-03 RX ORDER — ACETAMINOPHEN 650 MG/1
650 SUPPOSITORY RECTAL EVERY 6 HOURS PRN
Status: DISCONTINUED | OUTPATIENT
Start: 2021-07-03 | End: 2021-07-06 | Stop reason: HOSPADM

## 2021-07-03 RX ORDER — PANTOPRAZOLE SODIUM 40 MG/1
40 TABLET, DELAYED RELEASE ORAL
Status: DISCONTINUED | OUTPATIENT
Start: 2021-07-04 | End: 2021-07-06 | Stop reason: HOSPADM

## 2021-07-03 RX ORDER — ACETAMINOPHEN 325 MG/1
650 TABLET ORAL EVERY 6 HOURS PRN
Status: DISCONTINUED | OUTPATIENT
Start: 2021-07-03 | End: 2021-07-06 | Stop reason: HOSPADM

## 2021-07-03 RX ORDER — SODIUM CHLORIDE 0.9 % (FLUSH) 0.9 %
5-40 SYRINGE (ML) INJECTION PRN
Status: DISCONTINUED | OUTPATIENT
Start: 2021-07-03 | End: 2021-07-06 | Stop reason: HOSPADM

## 2021-07-03 RX ORDER — SODIUM CHLORIDE 0.9 % (FLUSH) 0.9 %
5-40 SYRINGE (ML) INJECTION EVERY 12 HOURS SCHEDULED
Status: DISCONTINUED | OUTPATIENT
Start: 2021-07-03 | End: 2021-07-06 | Stop reason: HOSPADM

## 2021-07-03 RX ORDER — ONDANSETRON 4 MG/1
4 TABLET, ORALLY DISINTEGRATING ORAL EVERY 8 HOURS PRN
Status: DISCONTINUED | OUTPATIENT
Start: 2021-07-03 | End: 2021-07-06 | Stop reason: HOSPADM

## 2021-07-03 RX ADMIN — IOPAMIDOL 100 ML: 755 INJECTION, SOLUTION INTRAVENOUS at 16:43

## 2021-07-03 RX ADMIN — BACLOFEN 10 MG: 10 TABLET ORAL at 20:00

## 2021-07-03 RX ADMIN — SODIUM CHLORIDE 500 ML: 9 INJECTION, SOLUTION INTRAVENOUS at 14:34

## 2021-07-03 RX ADMIN — FLECAINIDE ACETATE 50 MG: 50 TABLET ORAL at 20:00

## 2021-07-03 RX ADMIN — DONEPEZIL HYDROCHLORIDE 10 MG: 10 TABLET, FILM COATED ORAL at 19:59

## 2021-07-03 RX ADMIN — WARFARIN SODIUM 5 MG: 5 TABLET ORAL at 19:59

## 2021-07-03 RX ADMIN — SODIUM CHLORIDE: 9 INJECTION, SOLUTION INTRAVENOUS at 18:24

## 2021-07-03 ASSESSMENT — PAIN DESCRIPTION - PROGRESSION: CLINICAL_PROGRESSION: GRADUALLY WORSENING

## 2021-07-03 ASSESSMENT — PAIN DESCRIPTION - LOCATION: LOCATION: ABDOMEN;HEAD

## 2021-07-03 ASSESSMENT — PAIN SCALES - GENERAL
PAINLEVEL_OUTOF10: 0
PAINLEVEL_OUTOF10: 10
PAINLEVEL_OUTOF10: 0

## 2021-07-03 ASSESSMENT — PAIN DESCRIPTION - DESCRIPTORS: DESCRIPTORS: ACHING;CRAMPING

## 2021-07-03 ASSESSMENT — PAIN DESCRIPTION - FREQUENCY: FREQUENCY: CONTINUOUS

## 2021-07-03 ASSESSMENT — PAIN DESCRIPTION - ONSET: ONSET: ON-GOING

## 2021-07-03 ASSESSMENT — PAIN DESCRIPTION - PAIN TYPE: TYPE: ACUTE PAIN

## 2021-07-03 NOTE — PROGRESS NOTES
PHARMACY ANTICOAGULATION MONITORING SERVICE    Lula Falcon is a 82 y.o. female on warfarin therapy for PAF. Pharmacy consulted by Dr. Lidia Herron for monitoring and adjustment of treatment. Indication for anticoagulation: PAF  INR goal: 2 - 3  Warfarin dose prior to admission: 5 mg po daily (Dose adjusted by Dr Heriberto Antonio on 07/02/2021)    Pertinent Laboratory Values   Recent Labs     07/03/21  1442   HGB 13.0   HCT 40.8          Assessment/Plan:  Drug Interactions:   Levothyroxine (Home med)  Acetaminophen (PRN)  INR subtherapeutic on 07/01; Dose adjusted on 07/02/2021 to 5 mg po daily (outpatient)  Will resume recently adjusted Warfarin dose of 5 mg po daily; Trend INR daily  Pharmacy will continue to monitor and adjust warfarin therapy as indicated    Thank you for the consult.   Jamaal Grimm, [facility], [address]  7/3/2021 7:40 PM

## 2021-07-03 NOTE — ED PROVIDER NOTES
EMERGENCY DEPARTMENT ENCOUNTER      CHIEF COMPLAINT:   Abdominal pain  Diarrhea    HPI: Roxie Montgomery is a 82 y.o. female who presents to the Emergency Department complaining of continued diarrhea associated with generalized abdominal cramping. The patient has had diarrhea for the past 10 days. She was admitted to the hospital  for similar symptoms and was ultimately discharged home 1 week ago. She has continued to have diarrhea several times a day. She denies any bloody stools. She has intermittent cramping abdominal pain. Here are no exacerbating or relieving factors. She is becoming weak. She has not been eating. She lives alone. The patient denies fevers, chills, hematemesis, bloody stools, flank pain, or any other complaints. REVIEW OF SYSTEMS:  CONSTITUTIONAL:  Denies fever, chills, weight loss or weakness  EYES:  Denies photophobia or discharge  ENT:  Denies sore throat or ear pain  CARDIOVASCULAR:  Denies chest pain, palpitations or swelling  RESPIRATORY:  Denies cough or shortness of breath  GI: See HPI  MUSCULOSKELETAL:  Denies back pain  SKIN:  No rash  NEUROLOGIC:  Denies headache, focal weakness or sensory changes  All systems negative except as marked. \"Remaining review of systems reviewed and negative. I have reviewed the nursing triage documentation and agree unless otherwise noted below. \"    PAST MEDICAL HISTORY:   Past Medical History:   Diagnosis Date    Arthritis     Diverticulitis     Easy bruising 5/10/2016    H/O cardiovascular stress test 4/23/16    Normal stress test. EF 88%    H/O echocardiogram 4/23/16; 10/15/2020    EF 55-60%. Sclerotic aortic valve with mild stenosis. Mild MR & TR. Mild mitral stenosis with a mean gradient of 4mmHg.  Heart murmur 6/26/2018    Mild aortic stenosis.     History of Holter monitoring 6/9/2016    Normal Holter findings suggestive of normal sinus rhythm w/rare complex supraventricular ectopy without any clinically significant arrhythmias.  Hyperlipidemia     Hypertension     Leg cramps     PAF (paroxysmal atrial fibrillation) ([facility] [address] 5/10/2016    Controled on flecainide     Thyroid disease        CURRENT MEDICATIONS:   Home medications reviewed. SURGICAL HISTORY:   Past Surgical History:   Procedure Laterality Date    ABDOMEN SURGERY      colon surgery, had diverticulitis, had colon resection    CATARACT REMOVAL WITH IMPLANT Right 06/14/2019    by Dr. Micheal Cushing Left 06/28/2019    by Dr. Amarilys Burris  06/2018    INTRACAPSULAR CATARACT EXTRACTION Right 6/14/2019    EYE CATARACT EMULSIFICATION IOL IMPLANT performed by Tressa Juarez MD at [address] [place] [address] Left 6/28/2019    EYE CATARACT EMULSIFICATION IOL IMPLANT performed by Tressa Juarez MD at [place] [address]      elvia knee replacement    JOINT REPLACEMENT      left shoulder replacement    THYROID SURGERY      goiter       FAMILY HISTORY:   Family History   Problem Relation Age of Onset    Cancer Mother     Cancer Brother     Cancer Other        SOCIAL HISTORY:   Social History     Socioeconomic History    Marital status:       Spouse name: Not on file    Number of children: Not on file    Years of education: Not on file    Highest education level: Not on file   Occupational History    Not on file   Tobacco Use    Smoking status: Never Smoker    Smokeless tobacco: Never Used   Vaping Use    Vaping Use: Never used   Substance and Sexual Activity    Alcohol use: No    Drug use: No    Sexual activity: Never   Other Topics Concern    Not on file   Social History Narrative    Not on file     Social Determinants of Health     Financial Resource Strain:     Difficulty of Paying Living Expenses:    Food Insecurity:     Worried About Running Out of Food in the Last Year:     [address] in the Last Year:    Transportation Needs:     Lack of Transportation (Medical):  Lack of Transportation (Non-Medical):    Physical Activity:     Days of Exercise per Week:     Minutes of Exercise per Session:    Stress:     Feeling of Stress :    Social Connections:     Frequency of Communication with Friends and Family:     Frequency of Social Gatherings with Friends and Family:     Attends Sabianist Services:     Active Member of Clubs or Organizations:     Attends Club or Organization Meetings:     Marital Status:    Intimate Partner Violence:     Fear of Current or Ex-Partner:     Emotionally Abused:     Physically Abused:     Sexually Abused: ALLERGIES: Patient has no known allergies. PHYSICAL EXAM:  VITAL SIGNS:   ED Triage Vitals [07/03/21 1319]   Enc Vitals Group      BP (!) 143/80      Pulse 56      Resp 21      Temp 98.2 °F (36.8 °C)      Temp Source Oral      SpO2 100 %      Weight 146 lb (66.2 kg)      Height 5' 4\" (1.626 m)      Head Circumference       Peak Flow       Pain Score       Pain Loc       Pain Edu? Excl. in [address]? Constitutional:  Non-toxic appearance, generally weak  HENT: Normocephalic, Atraumatic, Bilateral external ears normal, Oropharynx tacky, no oral exudates, Nose normal.  Eyes: PERRL, conjunctiva normal   Neck: Normal range of motion, No tenderness, Supple, No stridor,No lymphadenopathy   Cardiovascular:  Normal heart rate, Normal rhythm  Pulmonary/Chest:  Normal breath sounds, No respiratory distress, No wheezing  Abdomen:   Bowel sounds normal, Soft, No tenderness, No masses, No pulsatile masses  Back:  No tenderness, No CVA tenderness  Extremities:  Normal range of motion, Intact distal pulses, No edema, No tenderness  Skin:  Warm, Dry, No erythema, No rash      EKG:    None    Radiology / Procedures:  CT ABDOMEN PELVIS W IV CONTRAST Additional Contrast? None (Final result)  Result time 07/03/21 17:03:40  Final result by Jace Yen MD (07/03/21 17:03:40)                Impression:    No acute abnormality identified. Narrative:    EXAMINATION:   CT OF THE ABDOMEN AND PELVIS WITH CONTRAST 7/3/2021 1:42 pm     TECHNIQUE:   CT of the abdomen and pelvis was performed with the administration of   intravenous contrast. Multiplanar reformatted images are provided for review. Dose modulation, iterative reconstruction, and/or weight based adjustment of   the mA/kV was utilized to reduce the radiation dose to as low as reasonably   achievable. COMPARISON:   06/24/2021     HISTORY:   ORDERING SYSTEM PROVIDED HISTORY: Abdominal pain, diarrhea   TECHNOLOGIST PROVIDED HISTORY:   Reason for exam:->Abdominal pain, diarrhea   Additional Contrast?->None   Decision Support Exception - unselect if not a suspected or confirmed   emergency medical condition->Emergency Medical Condition (MA)   Acuity: Acute   Type of Exam: Initial   Additional signs and symptoms: Abdominal Pain; Diarrhea; Headache, iv   contrast isovue 370 100ml in RT hand @ 1639 hmj     FINDINGS:   Lower Chest: Small right pleural effusion noted.  Visualized lungs otherwise   clear.  Base of the heart unremarkable. Organs: Liver enhances normally.  The gallbladder is surgically absent. Portal vein is patent. The spleen, adrenals, and pancreas are unremarkable in appearance. Extrarenal pelves are noted bilaterally, especially on the left. Catrina Martyr are   otherwise unremarkable. GI/Bowel: There is mild to moderate diverticulosis of the large bowel, but   without CT evidence of diverticulitis.  The large bowel is otherwise   unremarkable. Distal esophagus, stomach, and duodenal sweep are unremarkable.  The   remainder of the small bowel is unremarkable in appearance. The appendix is not well visualized. Pelvis: Uterus and adnexa regions unremarkable for age. Donnamarie Boas bladder   unremarkable.      Peritoneum/Retroperitoneum: Abdominal aorta is normal in caliber.  Moderate   vascular calcifications are seen. Aubrey Kai superior mesenteric artery is   enhancing.  No lymphadenopathy. Bones/Soft Tissues: No acute or suspicious bony abnormality. Labs Reviewed   CBC WITH AUTO DIFFERENTIAL - Abnormal; Notable for the following components:       Result Value    MCHC 31.9 (*)     RDW 15.6 (*)     Monocytes % 8.9 (*)     Eosinophils % 4.4 (*)     All other components within normal limits   COMPREHENSIVE METABOLIC PANEL W/ REFLEX TO MG FOR LOW K - Abnormal; Notable for the following components:    Albumin 3.2 (*)     Total Protein 6.1 (*)     GFR Non- 53 (*)     All other components within normal limits   URINALYSIS WITH MICROSCOPIC - Abnormal; Notable for the following components:    Cast Type NEGATIVE (*)     All other components within normal limits   COMPREHENSIVE METABOLIC PANEL W/ REFLEX TO MG FOR LOW K - Abnormal; Notable for the following components:    Calcium 8.2 (*)     Albumin 3.0 (*)     Total Protein 5.4 (*)     GFR Non- 53 (*)     All other components within normal limits   PROTIME-INR - Abnormal; Notable for the following components:    Protime 16.1 (*)     All other components within normal limits   CBC WITH AUTO DIFFERENTIAL - Abnormal; Notable for the following components:    RBC 3.87 (*)     Hemoglobin 11.7 (*)     Hematocrit 36.2 (*)     RDW 15.5 (*)     Monocytes % 9.8 (*)     Eosinophils % 5.9 (*)     Basophils % 1.2 (*)     All other components within normal limits   GI DISEASE PANEL PCR   LIPASE   LACTIC ACID, PLASMA   MAGNESIUM   PHOSPHORUS   LACTIC ACID, PLASMA   TSH WITH REFLEX       ED COURSE & MEDICAL DECISION MAKING:  Pertinent Labs & Imaging studies reviewed. (See chart for details)  On exam, the patient is afebrile and nontoxic appearing. She is mildly hypertensive on arrival but is asymptomatic and this improves without treatment. She is otherwise hemodynamically stable and neurologically intact.   Labs are obtained and are significant for an elevated lactic acid of 3.0. There are no other clinically significant lab abnormalities. Stool studies are pending. I suspect her elevated lactic acid is due to dehydration rather than infection or hypotension. CT abdomen and pelvis is negative. The patient was treated with 500 mL of IV normal saline. .    I suspect that the patient has diarrhea causing generalized weakness and dehydration. I have a low suspicion for acute appendicitis, intraabdominal abscess, perforation, bowel obstruction, AAA, dissection, ischemic bowel, or acute surgical abdomen. I recommended admission to the hospital and the patient was agreeable. I discussed the case with the hospitalist who will admit the patient for further treatment and care. The patient is currently in stable condition awaiting admission. Clinical Impression:  1. Diarrhea, unspecified type    2. Generalized weakness    3. Elevated lactic acid level        Comment: Please note this report has been produced using speech recognition software and may contain errors related to that system including errors in grammar, punctuation, and spelling, as well as words and phrases that may be inappropriate. If there are any questions or concerns please feel free to contact the dictating provider for clarification.         Krys Garibay MD  07/04/21 3716

## 2021-07-03 NOTE — H&P
Department of Internal Medicine  [facility] MEDICINE  Attending Physician History and Physical    PCP: Beryl Centeno MD  MRN: [id number]    CHIEF COMPLAINT: Diarrhea with crampy abdominal pain  Reason for Admission: For further management of lactic acidosis  History Obtained From:  patient, electronic medical record, ED provider    HISTORY OF PRESENT ILLNESS:    The patient Micky Jean-Baptiste is a 82 y.o. female with significant past medical history of essential hypertension, PAF with chronic anticoagulation with Coumadin, hypothyroidism acquired through surgery, GERD and mild dementia; who presents with recurrent diarrhea with associated crampy abdominal pain, and was found to have elevated lactic acid level of 3. Patient was therefore admitted for further work-up and management. Of note is a recent admission between 6/24 and 6/26 when patient had presented with similar diarrhea and abdominal crampy pain, was found then to have JAZMYN, hypokalemia and hypomagnesemia. This time around, her renal function is within normal limits and all her electrolyte imbalances have corrected. Patient also complains of pain in the left hip due to an arthritis. She denies any associated nausea or vomiting, melena or hematochezia, tenesmus, fever or chills, dizziness, chest pain or palpitation or diaphoresis. Patient was given a fluid bolus at the ED and admitted onto the hospitalist service after scheduling for a GI panel. She was asymptomatic when seen and examined at the ED with no family at the bedside. Patient admits living alone by herself with her 2 cats. Past Medical History:        Diagnosis Date    Arthritis     Diverticulitis     Easy bruising 5/10/2016    H/O cardiovascular stress test 4/23/16    Normal stress test. EF 88%    H/O echocardiogram 4/23/16; 10/15/2020    EF 55-60%. Sclerotic aortic valve with mild stenosis. Mild MR & TR. Mild mitral stenosis with a mean gradient of 4mmHg.     Heart murmur 6/26/2018    Mild aortic stenosis.  History of Holter monitoring 6/9/2016    Normal Holter findings suggestive of normal sinus rhythm w/rare complex supraventricular ectopy without any clinically significant arrhythmias.  Hyperlipidemia     Hypertension     Leg cramps     PAF (paroxysmal atrial fibrillation) ([address] 5/10/2016    Controled on flecainide     Thyroid disease        Past Surgical History:        Procedure Laterality Date    ABDOMEN SURGERY      colon surgery, had diverticulitis, had colon resection    CATARACT REMOVAL WITH IMPLANT Right 06/14/2019    by Dr. Sunshine Campa Left 06/28/2019    by Dr. Kris Holland  06/2018    INTRACAPSULAR CATARACT EXTRACTION Right 6/14/2019    EYE CATARACT EMULSIFICATION IOL IMPLANT performed by Jaylyn Plasencia MD at [address] [place] [address] Left 6/28/2019    EYE CATARACT EMULSIFICATION IOL IMPLANT performed by Jaylyn Plasencia MD at [address] [facility]      elvia knee replacement    JOINT REPLACEMENT      left shoulder replacement    THYROID SURGERY      goiter       Immunizations:              Influenza:  Up-to-date            Pneumococcal Polysaccharide:  Up-to-date; approximate date: 7/3/2021    Medications Prior to Admission:    Prior to Admission medications    Medication Sig Start Date End Date Taking?  Authorizing Provider   potassium chloride (KLOR-CON M) 20 MEQ extended release tablet Take 20 mEq by mouth daily 6/29/21   Historical Provider, MD   warfarin (COUMADIN) 2 MG tablet Take 4 mg by mouth daily    Historical Provider, MD   metoprolol tartrate (LOPRESSOR) 25 MG tablet Take 12.5 mg by mouth 2 times daily    Historical Provider, MD   flecainide (TAMBOCOR) 50 MG tablet Take 1 tablet by mouth 2 times daily 9/22/20   Clayton Keller MD   levothyroxine (SYNTHROID) 75 MCG tablet Take 75 mcg by mouth Daily    Historical Provider, MD   donepezil (ARICEPT) 10 MG tablet Take 10 mg by mouth nightly    Historical Provider, MD   baclofen (LIORESAL) 10 MG tablet Take 10 mg by mouth 2 times daily    Historical Provider, MD   furosemide (LASIX) 40 MG tablet Take 40 mg by mouth daily  5/6/16   Historical Provider, MD   omeprazole (PRILOSEC) 40 MG capsule Take 40 mg by mouth daily    Historical Provider, MD   warfarin (COUMADIN) 5 MG tablet Take 1 tablet by mouth daily  Patient taking differently: Take 4 mg by mouth daily  4/24/16   Matilde Connolly MD       Allergies:  Patient has no known allergies. Social History:   TOBACCO:   reports that she has never smoked. She has never used smokeless tobacco.  ETOH:   reports no history of alcohol use. DOMESTIC VIOLENCE:  no  ACTIVITIES OF DAILY LIVING:  Patient is able to perform all activities of daily living. INSTRUMENTAL ACTIVITIES OF DAILY LIVING:  Patient is able to perform all instrumental activities of daily living. OCCUPATION:  Retired  Patient currently lives alone    Code Status:  Full code    Family History:       Problem Relation Age of Onset    Cancer Mother     Cancer Brother     Cancer Other        Review of Systems:  ALL SYSTEMS WERE REVIEWED AND WERE UNREMARKABLE, WITH THE PERTINENT POSITIVES AND NEGATIVES AS DOCUMENTED IN THE HPI. PHYSICAL EXAM:  Vital Signs: /62   Pulse (!) 49   Temp 98.2 °F (36.8 °C) (Oral)   Resp 16   Ht 5' 4\" (1.626 m)   Wt 146 lb (66.2 kg)   SpO2 98%   BMI 25.06 kg/m²     General appearance: alert, appears stated age, cooperative and no distress  Head: Normocephalic, without obvious abnormality, atraumatic  Eyes: conjunctivae/corneas clear. PERRL, EOM's intact. Fundi benign. Ears: normal TM's and external ear canals both ears  Nose: Nares normal. Septum midline. Mucosa normal. No drainage or sinus tenderness.   Throat: lips, mucosa, and tongue normal; teeth and gums normal  Neck: no adenopathy, no carotid bruit, no JVD, supple, symmetrical, trachea midline and thyroid not enlarged, symmetric, no tenderness/mass/nodules  Back: symmetric, no curvature. ROM normal. No CVA tenderness. Lungs: clear to auscultation bilaterally  Heart: S1 and S2 present, bradycardic with positive murmur but no gallop  Abdomen: soft, non-tender; bowel sounds normal; no masses,  no organomegaly  Extremities: extremities normal, atraumatic, no cyanosis or edema  Pulses: 2+ and symmetric  Skin: Skin color, texture, turgor normal. No rashes or lesions  Neurologic: Alert and oriented X 3, normal strength and tone. Normal symmetric reflexes.  Normal coordination and gait      DATA:  CBC with Differential:    Lab Results   Component Value Date    WBC 9.1 07/03/2021    RBC 4.29 07/03/2021    HGB 13.0 07/03/2021    HCT 40.8 07/03/2021     07/03/2021    MCV 95.1 07/03/2021    MCH 30.3 07/03/2021    MCHC 31.9 07/03/2021    RDW 15.6 07/03/2021    SEGSPCT 58.3 07/03/2021    LYMPHOPCT 27.2 07/03/2021    MONOPCT 8.9 07/03/2021    BASOPCT 0.9 07/03/2021    MONOSABS 0.8 07/03/2021    LYMPHSABS 2.5 07/03/2021    EOSABS 0.4 07/03/2021    BASOSABS 0.1 07/03/2021    DIFFTYPE AUTOMATED DIFFERENTIAL 07/03/2021     CMP:    Lab Results   Component Value Date     07/03/2021    K 4.3 07/03/2021     07/03/2021    CO2 29 07/03/2021    BUN 11 07/03/2021    CREATININE 1.0 07/03/2021    GFRAA >60 07/03/2021    AGRATIO 1.5 08/05/2013    LABGLOM 53 07/03/2021    GLUCOSE 99 07/03/2021    PROT 6.1 07/03/2021    LABALBU 3.2 07/03/2021    CALCIUM 8.6 07/03/2021    BILITOT 0.4 07/03/2021    ALKPHOS 79 07/03/2021    AST 24 07/03/2021    ALT 12 07/03/2021     Magnesium:    Lab Results   Component Value Date    MG 2.1 06/26/2021     Phosphorus:    Lab Results   Component Value Date    PHOS 1.7 06/26/2021     Uric Acid:  No results found for: LABURIC, URICACID  PT/INR:    Lab Results   Component Value Date    PROTIME 18.6 06/26/2021    PROTIME 21.7 05/23/2018    INR 1.62 06/26/2021     PTT:    Lab Results   Component Value Date APTT 44.9 04/21/2016   [APTT}  Troponin:  No results found for: TROPONINI  TSH:  No results found for: TSH    ASSESSMENT / PLAN:   Principal Problem:    Lactic acidosis  This is likely secondary to dehydration from the persistent diarrhea, and patient also being on diuretic. Hold diuretic, maintain on IV fluid hydration cautiously overnight, reassess in the morning and repeat the lactic acid level. Also obtain uric acid. Recurrent Diarrhea  This is of unclear etiology after extensive work-up during her most recent admission and discharge. Scheduled for GI panel to rule out an infectious cause. If lactic acidosis persists, this may likely signal patient's diarrhea being from ischemic colitis, and at that point will benefit from rehydration and subsequent colonoscopy by GI consult to assess for the status and as well for healing. Right Hip Arthritis  We will maintain patient on pain medications for now. Continue with PT/OT evaluation with further recommendations. Active Problems:    HTN (hypertension)  Resume and continue patient's home antihypertensive regimen. Blood pressure is well controlled. Acquired hypothyroidism  Resume patient's home dose of Synthroid. Repeat TSH with reflex to FT4. Paroxysmal atrial fibrillation (HCC)  Resume patient's home dose of metoprolol 12.5 mg and flecainide 50 mg. Pharmacy to dose Coumadin per protocol to maintain INR between 2 and 3. Severe muscle deconditioning  Likely due to the persistent dehydration from cumulative effect of the diuresis as well as the diarrhea. Maintain patient in-house overnight, PT OT evaluation with further recommendations. Patient to be assessed by case management for possible placement if indicated. Chronic anticoagulation  Pharmacy to dose Coumadin per protocol maintaining INR between 2 and 3. Bradycardia, drug induced  Patient has as her home medications flecainide and metoprolol for her PAF.   She remains asymptomatic and therefore remain on the dose for now. Maintain patient under telemetry monitoring. If patient becomes symptomatic with the bradycardia, cardiology will be invited to adjust medications. Resolved Problems:    * No resolved hospital problems. *      Management as outlined, maintain patient in-house overnight and reassess in the morning. Management discussed with patient and all questions and concerns addressed. A.m. labs.     Electronically signed by Francisco Boothe MD, MD.

## 2021-07-03 NOTE — ED NOTES
Leanna Jettutant, lab, reports lactic is 3. Dr. Jorge Otto notified.       Emir Her, GOLDEN  07/03/21 7636

## 2021-07-04 LAB
ALBUMIN SERPL-MCNC: 3 GM/DL (ref 3.4–5)
ALP BLD-CCNC: 71 IU/L (ref 40–129)
ALT SERPL-CCNC: 12 U/L (ref 10–40)
ANION GAP SERPL CALCULATED.3IONS-SCNC: 9 MMOL/L (ref 4–16)
AST SERPL-CCNC: 21 IU/L (ref 15–37)
BASOPHILS ABSOLUTE: 0.1 K/CU MM
BASOPHILS RELATIVE PERCENT: 1.2 % (ref 0–1)
BILIRUB SERPL-MCNC: 0.6 MG/DL (ref 0–1)
BUN BLDV-MCNC: 9 MG/DL (ref 6–23)
CALCIUM SERPL-MCNC: 8.2 MG/DL (ref 8.3–10.6)
CHLORIDE BLD-SCNC: 103 MMOL/L (ref 99–110)
CO2: 28 MMOL/L (ref 21–32)
CREAT SERPL-MCNC: 1 MG/DL (ref 0.6–1.1)
DIFFERENTIAL TYPE: ABNORMAL
EKG ATRIAL RATE: 57 BPM
EKG DIAGNOSIS: NORMAL
EKG P AXIS: 76 DEGREES
EKG P-R INTERVAL: 222 MS
EKG Q-T INTERVAL: 486 MS
EKG QRS DURATION: 96 MS
EKG QTC CALCULATION (BAZETT): 473 MS
EKG R AXIS: -30 DEGREES
EKG T AXIS: 35 DEGREES
EKG VENTRICULAR RATE: 57 BPM
EOSINOPHILS ABSOLUTE: 0.4 K/CU MM
EOSINOPHILS RELATIVE PERCENT: 5.9 % (ref 0–3)
GFR AFRICAN AMERICAN: >60 ML/MIN/1.73M2
GFR NON-AFRICAN AMERICAN: 53 ML/MIN/1.73M2
GLUCOSE BLD-MCNC: 83 MG/DL (ref 70–99)
HCT VFR BLD CALC: 36.2 % (ref 37–47)
HEMOGLOBIN: 11.7 GM/DL (ref 12.5–16)
IMMATURE NEUTROPHIL %: 0.1 % (ref 0–0.43)
INR BLD: 1.28 INDEX
LACTATE: 1 MMOL/L (ref 0.4–2)
LYMPHOCYTES ABSOLUTE: 2 K/CU MM
LYMPHOCYTES RELATIVE PERCENT: 29.4 % (ref 24–44)
MCH RBC QN AUTO: 30.2 PG (ref 27–31)
MCHC RBC AUTO-ENTMCNC: 32.3 % (ref 32–36)
MCV RBC AUTO: 93.5 FL (ref 78–100)
MONOCYTES ABSOLUTE: 0.7 K/CU MM
MONOCYTES RELATIVE PERCENT: 9.8 % (ref 0–4)
PDW BLD-RTO: 15.5 % (ref 11.7–14.9)
PLATELET # BLD: 296 K/CU MM (ref 140–440)
PMV BLD AUTO: 9.4 FL (ref 7.5–11.1)
POTASSIUM SERPL-SCNC: 3.7 MMOL/L (ref 3.5–5.1)
PROTHROMBIN TIME: 16.1 SECONDS (ref 11.7–14.5)
RBC # BLD: 3.87 M/CU MM (ref 4.2–5.4)
SEGMENTED NEUTROPHILS ABSOLUTE COUNT: 3.7 K/CU MM
SEGMENTED NEUTROPHILS RELATIVE PERCENT: 53.6 % (ref 36–66)
SODIUM BLD-SCNC: 140 MMOL/L (ref 135–145)
TOTAL IMMATURE NEUTOROPHIL: 0.01 K/CU MM
TOTAL PROTEIN: 5.4 GM/DL (ref 6.4–8.2)
WBC # BLD: 6.8 K/CU MM (ref 4–10.5)

## 2021-07-04 PROCEDURE — G0378 HOSPITAL OBSERVATION PER HR: HCPCS

## 2021-07-04 PROCEDURE — 83605 ASSAY OF LACTIC ACID: CPT

## 2021-07-04 PROCEDURE — 85610 PROTHROMBIN TIME: CPT

## 2021-07-04 PROCEDURE — 99226 PR SBSQ OBSERVATION CARE/DAY 35 MINUTES: CPT | Performed by: INTERNAL MEDICINE

## 2021-07-04 PROCEDURE — 36415 COLL VENOUS BLD VENIPUNCTURE: CPT

## 2021-07-04 PROCEDURE — 6370000000 HC RX 637 (ALT 250 FOR IP): Performed by: INTERNAL MEDICINE

## 2021-07-04 PROCEDURE — 2580000003 HC RX 258: Performed by: INTERNAL MEDICINE

## 2021-07-04 PROCEDURE — 80053 COMPREHEN METABOLIC PANEL: CPT

## 2021-07-04 PROCEDURE — 93005 ELECTROCARDIOGRAM TRACING: CPT

## 2021-07-04 PROCEDURE — 94761 N-INVAS EAR/PLS OXIMETRY MLT: CPT

## 2021-07-04 PROCEDURE — 85025 COMPLETE CBC W/AUTO DIFF WBC: CPT

## 2021-07-04 RX ORDER — HYDROCODONE BITARTRATE AND ACETAMINOPHEN 5; 325 MG/1; MG/1
1 TABLET ORAL EVERY 6 HOURS PRN
Status: DISCONTINUED | OUTPATIENT
Start: 2021-07-04 | End: 2021-07-06 | Stop reason: HOSPADM

## 2021-07-04 RX ADMIN — LEVOTHYROXINE SODIUM 75 MCG: 0.07 TABLET ORAL at 05:37

## 2021-07-04 RX ADMIN — BACLOFEN 10 MG: 10 TABLET ORAL at 07:51

## 2021-07-04 RX ADMIN — BACLOFEN 10 MG: 10 TABLET ORAL at 19:53

## 2021-07-04 RX ADMIN — DONEPEZIL HYDROCHLORIDE 10 MG: 10 TABLET, FILM COATED ORAL at 19:53

## 2021-07-04 RX ADMIN — FLECAINIDE ACETATE 50 MG: 50 TABLET ORAL at 19:53

## 2021-07-04 RX ADMIN — SODIUM CHLORIDE: 9 INJECTION, SOLUTION INTRAVENOUS at 11:27

## 2021-07-04 RX ADMIN — POTASSIUM CHLORIDE 20 MEQ: 20 TABLET, EXTENDED RELEASE ORAL at 07:52

## 2021-07-04 RX ADMIN — PANTOPRAZOLE SODIUM 40 MG: 40 TABLET, DELAYED RELEASE ORAL at 05:37

## 2021-07-04 RX ADMIN — WARFARIN SODIUM 5 MG: 5 TABLET ORAL at 17:25

## 2021-07-04 RX ADMIN — METOPROLOL TARTRATE 12.5 MG: 25 TABLET, FILM COATED ORAL at 07:52

## 2021-07-04 RX ADMIN — FLECAINIDE ACETATE 50 MG: 50 TABLET ORAL at 07:52

## 2021-07-04 ASSESSMENT — PAIN SCALES - GENERAL
PAINLEVEL_OUTOF10: 0

## 2021-07-04 NOTE — PROGRESS NOTES
ATTENDING PHYSICIAN'S PROGRESS NOTES    Patient:  Lina Sullivan      Unit/Bed:004/004-01    YOB: 1935    MRN: [id number]     Acct: [de-identified]     Admit date: 7/3/2021    Patient Seen, Chart, Consults notes, Labs, Radiology studies reviewed. SUBJECTIVE:   Day 1 of stay with persistent diarrhea with lactic acidosis and most recent (in last 24 hours) has had improvement in the diarrhea, having had only one bowel movement this morning, with resolution of the lactic acidosis. All other ROS negative except noted in HPI    Past, Family, Social History unchanged from admission. Diet:  ADULT DIET; Regular; Low Fat/Low Chol/High Fiber/ITA; No Added Salt (3-4 gm)    Medications:  Scheduled Meds:   baclofen  10 mg Oral BID    donepezil  10 mg Oral Nightly    flecainide  50 mg Oral BID    levothyroxine  75 mcg Oral Daily    metoprolol tartrate  12.5 mg Oral BID    pantoprazole  40 mg Oral QAM AC    potassium chloride  20 mEq Oral Daily    sodium chloride flush  5-40 mL Intravenous 2 times per day    warfarin  5 mg Oral Daily     Continuous Infusions:   sodium chloride 125 mL/hr at 07/04/21 1127    sodium chloride       PRN Meds:HYDROcodone 5 mg - acetaminophen, sodium chloride flush, sodium chloride, ondansetron **OR** ondansetron, polyethylene glycol, acetaminophen **OR** acetaminophen, potassium chloride, magnesium sulfate    OBJECTIVE:    CBC:   Recent Labs     07/03/21  1442 07/04/21  0700   WBC 9.1 6.8   HGB 13.0 11.7*    296     BMP:    Recent Labs     07/03/21  1442 07/04/21  0600    140   K 4.3 3.7    103   CO2 29 28   BUN 11 9   CREATININE 1.0 1.0   GLUCOSE 99 83     Calcium:  Recent Labs     07/04/21  0600   CALCIUM 8.2*     Ionized Calcium:No results for input(s): IONCA in the last 72 hours.   Magnesium:  Recent Labs     07/03/21  1815   MG 1.9     Phosphorus:  Recent Labs     07/03/21  1815   PHOS 3.0     BNP:No results for input(s): BNP in the last 72 hours.  Glucose:No results for input(s): POCGLU in the last 72 hours. HgbA1C: No results for input(s): LABA1C in the last 72 hours. INR:   Recent Labs     07/04/21  0600   INR 1.28     Hepatic:   Recent Labs     07/04/21  0600   ALKPHOS 71   ALT 12   AST 21   PROT 5.4*   BILITOT 0.6   LABALBU 3.0*     Amylase and Lipase:No results for input(s): LACTA, AMYLASE in the last 72 hours. Lactic Acid: No results for input(s): LACTA in the last 72 hours. Troponin: No results for input(s): CKTOTAL, CKMB, TROPONINT in the last 72 hours. BNP: No results for input(s): BNP in the last 72 hours. Lipids: No results for input(s): CHOL, TRIG, HDL, LDLCALC in the last 72 hours. Invalid input(s): LDL  ABGs: No results found for: PH, PCO2, PO2, HCO3, O2SAT    Radiology reports as per the Radiologist  Radiology: CT ABDOMEN PELVIS W IV CONTRAST Additional Contrast? None    Result Date: 7/3/2021  EXAMINATION: CT OF THE ABDOMEN AND PELVIS WITH CONTRAST 7/3/2021 1:42 pm TECHNIQUE: CT of the abdomen and pelvis was performed with the administration of intravenous contrast. Multiplanar reformatted images are provided for review. Dose modulation, iterative reconstruction, and/or weight based adjustment of the mA/kV was utilized to reduce the radiation dose to as low as reasonably achievable. COMPARISON: 06/24/2021 HISTORY: ORDERING SYSTEM PROVIDED HISTORY: Abdominal pain, diarrhea TECHNOLOGIST PROVIDED HISTORY: Reason for exam:->Abdominal pain, diarrhea Additional Contrast?->None Decision Support Exception - unselect if not a suspected or confirmed emergency medical condition->Emergency Medical Condition (MA) Acuity: Acute Type of Exam: Initial Additional signs and symptoms: Abdominal Pain; Diarrhea; Headache, iv contrast isovue 370 100ml in RT hand @ 1639 hmj FINDINGS: Lower Chest: Small right pleural effusion noted. Visualized lungs otherwise clear. Base of the heart unremarkable. Organs: Liver enhances normally.   The gallbladder is surgically absent. Portal vein is patent. The spleen, adrenals, and pancreas are unremarkable in appearance. Extrarenal pelves are noted bilaterally, especially on the left. Kidneys are otherwise unremarkable. GI/Bowel: There is mild to moderate diverticulosis of the large bowel, but without CT evidence of diverticulitis. The large bowel is otherwise unremarkable. Distal esophagus, stomach, and duodenal sweep are unremarkable. The remainder of the small bowel is unremarkable in appearance. The appendix is not well visualized. Pelvis: Uterus and adnexa regions unremarkable for age. Urinary bladder unremarkable. Peritoneum/Retroperitoneum: Abdominal aorta is normal in caliber. Moderate vascular calcifications are seen. The superior mesenteric artery is enhancing. No lymphadenopathy. Bones/Soft Tissues: No acute or suspicious bony abnormality. No acute abnormality identified. Physical Exam:  Vitals: /67   Pulse 57   Temp 98.3 °F (36.8 °C) (Oral)   Resp 16   Ht 5' 4\" (1.626 m)   Wt 142 lb (64.4 kg)   SpO2 98%   BMI 24.37 kg/m²   24 hour intake/output:    Intake/Output Summary (Last 24 hours) at 7/4/2021 1534  Last data filed at 7/4/2021 1128  Gross per 24 hour   Intake 2041.8 ml   Output 650 ml   Net 1391.8 ml     Last 3 weights:   Wt Readings from Last 3 Encounters:   07/03/21 142 lb (64.4 kg)   06/26/21 151 lb 11.2 oz (68.8 kg)   05/21/21 145 lb 9.6 oz (66 kg)       General appearance - alert, well appearing, and in no distress, oriented to person, place, and time and acyanotic, in no respiratory distress  HEENT: Normocephalic, Atraumatic, Conjuctiva pink, PERRL, Oral mucosa normal, Lips, teeth and gums normal, Trachea midline, Thyroid normal and No noted lymphadenopathy  Chest - clear to auscultation, no wheezes, rales or rhonchi, symmetric air entry  Cardiovascular -S1 and S2 present, irregular with intermittent bradycardia with murmur but no gallop  Abdomen - soft, nontender, nondistended, no masses or organomegaly   Neurological - Alert and oriented, Normal speech, No focal findings or movement disorder noted and Motor and sensory grossly normal bilaterally  Integumentary - Skin color, texture, turgor normal. No Rashes or lesions  Musculoskeletal -Full ROM times 4 extremities, No clubbing or cyanosis and No peripheral edema      DVT prophylaxis: [] Lovenox                                 [] SCDs                                 [] SQ Heparin                                 [] Encourage ambulation           [x] Already on Anticoagulation                 ASSESSMENT / PLAN :    Principal Problem:    Lactic acidosis  Resolved with IV fluid hydration. We will continue to monitor.       Recurrent Diarrhea  Awaiting GI panel to rule out infectious etiology.        Right Hip Arthritis  We will maintain patient on pain medications for now. Continue with PT/OT evaluation with further recommendations. Subtherapeutic INR  This is likely due to malabsorption of the Coumadin due to intestinal heroine from the diarrhea. Coumadin dosing as per the pharmacy to maintain INR between 2 and 3.     Active Problems:    HTN (hypertension)  Resume and continue patient's home antihypertensive regimen. Blood pressure is well controlled.       Acquired hypothyroidism  Resume patient's home dose of Synthroid. Repeat TSH with reflex to FT4.       Paroxysmal atrial fibrillation (HCC)  Resume patient's home dose of metoprolol 12.5 mg and flecainide 50 mg. Pharmacy to dose Coumadin per protocol to maintain INR between 2 and 3.       Severe muscle deconditioning  Likely due to the persistent dehydration from cumulative effect of the diuresis as well as the diarrhea. Maintain patient in-house overnight, PT OT evaluation with further recommendations.   Patient to be assessed by case management for possible placement if indicated.       Chronic anticoagulation  Pharmacy to dose Coumadin per protocol maintaining INR between 2 and 3.       Bradycardia, drug induced  Patient has as her home medications flecainide and metoprolol for her PAF. She remains asymptomatic and therefore remain on the dose for now. Maintain patient under telemetry monitoring. If patient becomes symptomatic with the bradycardia, cardiology will be invited to adjust medications.     Resolved Problems:    * No resolved hospital problems.  *      Electronically signed by Vandana Keith MD on 7/4/2021 at 3:34 PM    Rounding Hospitalist

## 2021-07-04 NOTE — PLAN OF CARE
Patient has requested assistance before getting out of bed. Safety measures used during this shift include bed alarm, call light next to patient, bed in lowest position, and side rails up times two.   Problem: Falls - Risk of:  Goal: Will remain free from falls  Description: Will remain free from falls  Outcome: Ongoing

## 2021-07-05 ENCOUNTER — APPOINTMENT (OUTPATIENT)
Dept: GENERAL RADIOLOGY | Age: 86
End: 2021-07-05
Payer: MEDICARE

## 2021-07-05 LAB
ADENOVIRUS F 40 41 PCR: NOT DETECTED
ALBUMIN SERPL-MCNC: 2.4 GM/DL (ref 3.4–5)
ALP BLD-CCNC: 56 IU/L (ref 40–129)
ALT SERPL-CCNC: 9 U/L (ref 10–40)
ANION GAP SERPL CALCULATED.3IONS-SCNC: 5 MMOL/L (ref 4–16)
AST SERPL-CCNC: 15 IU/L (ref 15–37)
ASTROVIRUS PCR: NOT DETECTED
BASOPHILS ABSOLUTE: 0.1 K/CU MM
BASOPHILS RELATIVE PERCENT: 1.4 % (ref 0–1)
BILIRUB SERPL-MCNC: 0.3 MG/DL (ref 0–1)
BUN BLDV-MCNC: 9 MG/DL (ref 6–23)
CALCIUM SERPL-MCNC: 7.6 MG/DL (ref 8.3–10.6)
CAMPYLOBACTER PCR: NOT DETECTED
CHLORIDE BLD-SCNC: 110 MMOL/L (ref 99–110)
CO2: 24 MMOL/L (ref 21–32)
CREAT SERPL-MCNC: 0.9 MG/DL (ref 0.6–1.1)
CRYPTOSPORIDIUM PCR: NOT DETECTED
CYCLOSPORA CAYETANENSIS PCR: NOT DETECTED
DIFFERENTIAL TYPE: ABNORMAL
E COLI 0157 PCR: NOT DETECTED
E COLI ENTEROAGGREGATIVE PCR: NOT DETECTED
E COLI ENTEROPATHOGENIC PCR: NOT DETECTED
E COLI ENTEROTOXIGENIC PCR: NOT DETECTED
E COLI SHIGA LIKE TOXIN PCR: NOT DETECTED
E COLI SHIGELLA/ENTEROINVASIVE PCR: NOT DETECTED
ENTAMOEBA HISTOLYTICA PCR: NOT DETECTED
EOSINOPHILS ABSOLUTE: 0.5 K/CU MM
EOSINOPHILS RELATIVE PERCENT: 7.7 % (ref 0–3)
GFR AFRICAN AMERICAN: >60 ML/MIN/1.73M2
GFR NON-AFRICAN AMERICAN: 60 ML/MIN/1.73M2
GIARDIA LAMBLIA PCR: NOT DETECTED
GLUCOSE BLD-MCNC: 78 MG/DL (ref 70–99)
HCT VFR BLD CALC: 34.8 % (ref 37–47)
HEMOGLOBIN: 11.1 GM/DL (ref 12.5–16)
IMMATURE NEUTROPHIL %: 0.1 % (ref 0–0.43)
INR BLD: 1.63 INDEX
LYMPHOCYTES ABSOLUTE: 2.8 K/CU MM
LYMPHOCYTES RELATIVE PERCENT: 39.6 % (ref 24–44)
MCH RBC QN AUTO: 30.4 PG (ref 27–31)
MCHC RBC AUTO-ENTMCNC: 31.9 % (ref 32–36)
MCV RBC AUTO: 95.3 FL (ref 78–100)
MONOCYTES ABSOLUTE: 0.6 K/CU MM
MONOCYTES RELATIVE PERCENT: 8.7 % (ref 0–4)
NOROVIRUS GI GII PCR: NOT DETECTED
PDW BLD-RTO: 15.9 % (ref 11.7–14.9)
PLATELET # BLD: 282 K/CU MM (ref 140–440)
PLESIOMONAS SHIGELLOIDES PCR: NOT DETECTED
PMV BLD AUTO: 9.5 FL (ref 7.5–11.1)
POTASSIUM SERPL-SCNC: 3.8 MMOL/L (ref 3.5–5.1)
PRO-BNP: 2175 PG/ML
PROTHROMBIN TIME: 20.5 SECONDS (ref 11.7–14.5)
RBC # BLD: 3.65 M/CU MM (ref 4.2–5.4)
ROTAVIRUS A PCR: NOT DETECTED
SALMONELLA PCR: NOT DETECTED
SAPOVIRUS PCR: NOT DETECTED
SEGMENTED NEUTROPHILS ABSOLUTE COUNT: 3 K/CU MM
SEGMENTED NEUTROPHILS RELATIVE PERCENT: 42.5 % (ref 36–66)
SODIUM BLD-SCNC: 139 MMOL/L (ref 135–145)
TOTAL IMMATURE NEUTOROPHIL: 0.01 K/CU MM
TOTAL PROTEIN: 4.4 GM/DL (ref 6.4–8.2)
VIBRIO CHOLERAE PCR: NOT DETECTED
VIBRIO PCR: NOT DETECTED
WBC # BLD: 7 K/CU MM (ref 4–10.5)
YERSINIA ENTEROCOLITICA PCR: NOT DETECTED

## 2021-07-05 PROCEDURE — 97535 SELF CARE MNGMENT TRAINING: CPT

## 2021-07-05 PROCEDURE — 94761 N-INVAS EAR/PLS OXIMETRY MLT: CPT

## 2021-07-05 PROCEDURE — 99226 PR SBSQ OBSERVATION CARE/DAY 35 MINUTES: CPT | Performed by: INTERNAL MEDICINE

## 2021-07-05 PROCEDURE — 6370000000 HC RX 637 (ALT 250 FOR IP): Performed by: INTERNAL MEDICINE

## 2021-07-05 PROCEDURE — 83880 ASSAY OF NATRIURETIC PEPTIDE: CPT

## 2021-07-05 PROCEDURE — G0378 HOSPITAL OBSERVATION PER HR: HCPCS

## 2021-07-05 PROCEDURE — 6360000002 HC RX W HCPCS: Performed by: INTERNAL MEDICINE

## 2021-07-05 PROCEDURE — 96375 TX/PRO/DX INJ NEW DRUG ADDON: CPT

## 2021-07-05 PROCEDURE — 80053 COMPREHEN METABOLIC PANEL: CPT

## 2021-07-05 PROCEDURE — 85025 COMPLETE CBC W/AUTO DIFF WBC: CPT

## 2021-07-05 PROCEDURE — 6360000002 HC RX W HCPCS: Performed by: PHYSICIAN ASSISTANT

## 2021-07-05 PROCEDURE — 94640 AIRWAY INHALATION TREATMENT: CPT

## 2021-07-05 PROCEDURE — 36415 COLL VENOUS BLD VENIPUNCTURE: CPT

## 2021-07-05 PROCEDURE — 97166 OT EVAL MOD COMPLEX 45 MIN: CPT

## 2021-07-05 PROCEDURE — 96374 THER/PROPH/DIAG INJ IV PUSH: CPT

## 2021-07-05 PROCEDURE — 2580000003 HC RX 258: Performed by: INTERNAL MEDICINE

## 2021-07-05 PROCEDURE — 85610 PROTHROMBIN TIME: CPT

## 2021-07-05 PROCEDURE — 71045 X-RAY EXAM CHEST 1 VIEW: CPT

## 2021-07-05 RX ORDER — FUROSEMIDE 10 MG/ML
40 INJECTION INTRAMUSCULAR; INTRAVENOUS ONCE
Status: COMPLETED | OUTPATIENT
Start: 2021-07-05 | End: 2021-07-05

## 2021-07-05 RX ORDER — IPRATROPIUM BROMIDE AND ALBUTEROL SULFATE 2.5; .5 MG/3ML; MG/3ML
1 SOLUTION RESPIRATORY (INHALATION) ONCE
Status: COMPLETED | OUTPATIENT
Start: 2021-07-05 | End: 2021-07-05

## 2021-07-05 RX ADMIN — PANTOPRAZOLE SODIUM 40 MG: 40 TABLET, DELAYED RELEASE ORAL at 05:48

## 2021-07-05 RX ADMIN — SODIUM CHLORIDE: 9 INJECTION, SOLUTION INTRAVENOUS at 16:11

## 2021-07-05 RX ADMIN — METOPROLOL TARTRATE 12.5 MG: 25 TABLET, FILM COATED ORAL at 20:25

## 2021-07-05 RX ADMIN — ONDANSETRON 4 MG: 2 INJECTION INTRAMUSCULAR; INTRAVENOUS at 08:08

## 2021-07-05 RX ADMIN — IPRATROPIUM BROMIDE AND ALBUTEROL SULFATE 1 AMPULE: .5; 3 SOLUTION RESPIRATORY (INHALATION) at 20:30

## 2021-07-05 RX ADMIN — FUROSEMIDE 40 MG: 10 INJECTION, SOLUTION INTRAVENOUS at 20:39

## 2021-07-05 RX ADMIN — DONEPEZIL HYDROCHLORIDE 10 MG: 10 TABLET, FILM COATED ORAL at 20:25

## 2021-07-05 RX ADMIN — FLECAINIDE ACETATE 50 MG: 50 TABLET ORAL at 20:25

## 2021-07-05 RX ADMIN — LEVOTHYROXINE SODIUM 75 MCG: 0.07 TABLET ORAL at 05:48

## 2021-07-05 RX ADMIN — SODIUM CHLORIDE, PRESERVATIVE FREE 10 ML: 5 INJECTION INTRAVENOUS at 20:24

## 2021-07-05 RX ADMIN — BACLOFEN 10 MG: 10 TABLET ORAL at 20:25

## 2021-07-05 RX ADMIN — WARFARIN SODIUM 5 MG: 5 TABLET ORAL at 17:18

## 2021-07-05 ASSESSMENT — PAIN SCALES - GENERAL
PAINLEVEL_OUTOF10: 0

## 2021-07-05 NOTE — PROGRESS NOTES
ATTENDING PHYSICIAN'S PROGRESS NOTES    Patient:  Rolanda Neal      Unit/Bed:004/004-01    YOB: 1935    MRN: [id number]     Acct: [de-identified]     Admit date: 7/3/2021    Patient Seen, Chart, Consults notes, Labs, Radiology studies reviewed. SUBJECTIVE:   Day 1 of stay with persistent diarrhea with lactic acidosis and most recent (in last 24 hours) has had improvement in the diarrhea, having had only one bowel movement this morning, with resolution of the lactic acidosis. GI panel is negative. Patient was to be discharged today, however she has started complaining of nausea with abdominal discomfort and generalized weakness. Denies any fever or chills, melena or hematochezia. All other ROS negative except noted in HPI    Past, Family, Social History unchanged from admission. Diet:  ADULT DIET;  Regular; Low Fat/Low Chol/High Fiber/ITA; No Added Salt (3-4 gm)    Medications:  Scheduled Meds:   baclofen  10 mg Oral BID    donepezil  10 mg Oral Nightly    flecainide  50 mg Oral BID    levothyroxine  75 mcg Oral Daily    metoprolol tartrate  12.5 mg Oral BID    pantoprazole  40 mg Oral QAM AC    potassium chloride  20 mEq Oral Daily    sodium chloride flush  5-40 mL Intravenous 2 times per day    warfarin  5 mg Oral Daily     Continuous Infusions:   sodium chloride 75 mL/hr at 07/05/21 0757    sodium chloride       PRN Meds:HYDROcodone 5 mg - acetaminophen, sodium chloride flush, sodium chloride, ondansetron **OR** ondansetron, polyethylene glycol, acetaminophen **OR** acetaminophen, potassium chloride, magnesium sulfate    OBJECTIVE:    CBC:   Recent Labs     07/03/21  1442 07/04/21  0700 07/05/21  0600   WBC 9.1 6.8 7.0   HGB 13.0 11.7* 11.1*    296 282     BMP:    Recent Labs     07/03/21  1442 07/04/21  0600 07/05/21  0600    140 139   K 4.3 3.7 3.8    103 110   CO2 29 28 24   BUN 11 9 9   CREATININE 1.0 1.0 0.9   GLUCOSE 99 83 78     Calcium:  Recent Labs     07/05/21  0600   CALCIUM 7.6*     Ionized Calcium:No results for input(s): IONCA in the last 72 hours. Magnesium:  Recent Labs     07/03/21  1815   MG 1.9     Phosphorus:  Recent Labs     07/03/21  1815   PHOS 3.0     BNP:No results for input(s): BNP in the last 72 hours. Glucose:No results for input(s): POCGLU in the last 72 hours. HgbA1C: No results for input(s): LABA1C in the last 72 hours. INR:   Recent Labs     07/05/21  0600   INR 1.63     Hepatic:   Recent Labs     07/05/21  0600   ALKPHOS 56   ALT 9*   AST 15   PROT 4.4*   BILITOT 0.3   LABALBU 2.4*     Amylase and Lipase:No results for input(s): LACTA, AMYLASE in the last 72 hours. Lactic Acid: No results for input(s): LACTA in the last 72 hours. Troponin: No results for input(s): CKTOTAL, CKMB, TROPONINT in the last 72 hours. BNP: No results for input(s): BNP in the last 72 hours. Lipids: No results for input(s): CHOL, TRIG, HDL, LDLCALC in the last 72 hours. Invalid input(s): LDL  ABGs: No results found for: PH, PCO2, PO2, HCO3, O2SAT    Radiology reports as per the Radiologist  Radiology: CT ABDOMEN PELVIS W IV CONTRAST Additional Contrast? None    Result Date: 7/3/2021  EXAMINATION: CT OF THE ABDOMEN AND PELVIS WITH CONTRAST 7/3/2021 1:42 pm TECHNIQUE: CT of the abdomen and pelvis was performed with the administration of intravenous contrast. Multiplanar reformatted images are provided for review. Dose modulation, iterative reconstruction, and/or weight based adjustment of the mA/kV was utilized to reduce the radiation dose to as low as reasonably achievable.  COMPARISON: 06/24/2021 HISTORY: ORDERING SYSTEM PROVIDED HISTORY: Abdominal pain, diarrhea TECHNOLOGIST PROVIDED HISTORY: Reason for exam:->Abdominal pain, diarrhea Additional Contrast?->None Decision Support Exception - unselect if not a suspected or confirmed emergency medical condition->Emergency Medical Condition (MA) Acuity: Acute Type of Exam: Initial Additional signs and symptoms: Abdominal Pain; Diarrhea; Headache, iv contrast isovue 370 100ml in RT hand @ 1639 hmj FINDINGS: Lower Chest: Small right pleural effusion noted. Visualized lungs otherwise clear. Base of the heart unremarkable. Organs: Liver enhances normally. The gallbladder is surgically absent. Portal vein is patent. The spleen, adrenals, and pancreas are unremarkable in appearance. Extrarenal pelves are noted bilaterally, especially on the left. Kidneys are otherwise unremarkable. GI/Bowel: There is mild to moderate diverticulosis of the large bowel, but without CT evidence of diverticulitis. The large bowel is otherwise unremarkable. Distal esophagus, stomach, and duodenal sweep are unremarkable. The remainder of the small bowel is unremarkable in appearance. The appendix is not well visualized. Pelvis: Uterus and adnexa regions unremarkable for age. Urinary bladder unremarkable. Peritoneum/Retroperitoneum: Abdominal aorta is normal in caliber. Moderate vascular calcifications are seen. The superior mesenteric artery is enhancing. No lymphadenopathy. Bones/Soft Tissues: No acute or suspicious bony abnormality. No acute abnormality identified. Physical Exam:  Vitals: BP (!) 148/69   Pulse 59   Temp 97.9 °F (36.6 °C) (Oral)   Resp 26   Ht 5' 4\" (1.626 m)   Wt 142 lb (64.4 kg)   SpO2 97%   BMI 24.37 kg/m²   24 hour intake/output:    Intake/Output Summary (Last 24 hours) at 7/5/2021 1410  Last data filed at 7/4/2021 1801  Gross per 24 hour   Intake 1046 ml   Output 200 ml   Net 846 ml     Last 3 weights:   Wt Readings from Last 3 Encounters:   07/03/21 142 lb (64.4 kg)   06/26/21 151 lb 11.2 oz (68.8 kg)   05/21/21 145 lb 9.6 oz (66 kg)       General appearance - alert, ill appearing, and in mild distress, oriented to person, place, and time and acyanotic, in no respiratory distress  HEENT: Normocephalic, Atraumatic, Conjuctiva pink, PERRL, Oral mucosa normal, Lips, teeth and gums normal, Trachea midline, Thyroid normal and No noted lymphadenopathy  Chest - clear to auscultation, no wheezes, rales or rhonchi, symmetric air entry  Cardiovascular -S1 and S2 present, irregular with intermittent bradycardia with murmur but no gallop  Abdomen - soft, nontender, nondistended, no masses or organomegaly   Neurological - Alert and oriented, Normal speech, No focal findings or movement disorder noted and Motor and sensory grossly normal bilaterally  Integumentary - Skin color, texture, turgor normal. No Rashes or lesions  Musculoskeletal -Full ROM times 4 extremities, No clubbing or cyanosis and No peripheral edema      DVT prophylaxis: [] Lovenox                                 [] SCDs                                 [] SQ Heparin                                 [] Encourage ambulation           [x] Already on Anticoagulation                 ASSESSMENT / PLAN :    Principal Problem:      Nausea  Unclear etiology, patient given Zofran with some relief. Will maintain her in-house overnight and reassess in the morning for possible discharge. Lactic acidosis  Resolved        Recurrent Diarrhea  Resolved and GI panel negative for any pathology.          Right Hip Arthritis  We will maintain patient on pain medications for now. Continue with PT/OT evaluation with further recommendations. Subtherapeutic INR  Still remains subtherapeutic and this is likely due to malabsorption of the Coumadin due to intestinal heroine from the diarrhea. Coumadin dosing as per the pharmacy to maintain INR between 2 and 3.     Active Problems:    HTN (hypertension)  Resume and continue patient's home antihypertensive regimen. Blood pressure is well controlled.       Acquired hypothyroidism  Resume patient's home dose of Synthroid. Repeat TSH with reflex to FT4.       Paroxysmal atrial fibrillation (HCC)  Resume patient's home dose of metoprolol 12.5 mg and flecainide 50 mg.   Pharmacy to dose Coumadin per protocol to maintain INR between 2 and 3.       Severe muscle deconditioning  Likely due to the persistent dehydration from cumulative effect of the diuresis as well as the diarrhea. Maintain patient in-house overnight, PT OT evaluation with further recommendations. Patient to be assessed by case management for possible placement if indicated.       Chronic anticoagulation  Pharmacy to dose Coumadin per protocol maintaining INR between 2 and 3.       Bradycardia, drug induced  Patient has as her home medications flecainide and metoprolol for her PAF. She remains asymptomatic and therefore remain on the dose for now. Maintain patient under telemetry monitoring. If patient becomes symptomatic with the bradycardia, cardiology will be invited to adjust medications.     Resolved Problems:    * No resolved hospital problems. *    Patient to be maintained in-house overnight and reassess in the morning for possible discharge. A.m. labs.     Electronically signed by Roge Caldwell MD on 7/5/2021 at 2:10 PM    [facility]

## 2021-07-05 NOTE — PROGRESS NOTES
Occupational Therapy   Occupational Therapy Initial Assessment  Date: 2021   Patient Name: Vin Lloyd  MRN: [id number]     : 1935    Date of Service: 2021    Discharge Recommendations:  Continue to assess pending progress, IP Rehab, Home with assist PRN  OT Equipment Recommendations  Equipment Needed: No    Assessment   Performance deficits / Impairments: Decreased functional mobility ; Decreased ADL status; Decreased strength;Decreased endurance  Assessment: Pt is a pleasant 81 yo female who would benefit from occupational therapy to address the following deficits and to improve strength, endurance and maximize indep with ADLs and functional mobility as she lives at home alone. Treatment Diagnosis: Weakness  Prognosis: Good  Decision Making: Medium Complexity  OT Education: OT Role;Plan of Care;Transfer Training  Barriers to Learning: None  REQUIRES OT FOLLOW UP: Yes  Activity Tolerance  Activity Tolerance: Patient limited by fatigue  Activity Tolerance: Pt reports feeling tired and weak and stomach does not feel well. pt reports she didn't want any breakfast  Safety Devices  Safety Devices in place: Yes  Type of devices: All fall risk precautions in place; Patient at risk for falls; Left in chair;Chair alarm in place;Call light within reach;Nurse notified;Gait belt  Restraints  Initially in place: No           Patient Diagnosis(es): The primary encounter diagnosis was Diarrhea, unspecified type. Diagnoses of Generalized weakness and Elevated lactic acid level were also pertinent to this visit. has a past medical history of Arthritis, Diverticulitis, Easy bruising, H/O cardiovascular stress test, H/O echocardiogram, Heart murmur, History of Holter monitoring, Hyperlipidemia, Hypertension, Leg cramps, PAF (paroxysmal atrial fibrillation) ([address] and Thyroid disease. has a past surgical history that includes Cholecystectomy; Thyroid surgery; Abdomen surgery;  Colonoscopy; joint replacement; joint replacement; Eye surgery (06/2018); Cataract removal with implant (Right, 06/14/2019); Intracapsular cataract extraction (Right, 6/14/2019); Cataract removal with implant (Left, 06/28/2019); and Intracapsular cataract extraction (Left, 6/28/2019).     Treatment Diagnosis: Weakness      Restrictions  Restrictions/Precautions  Restrictions/Precautions: Fall Risk, General Precautions  Required Braces or Orthoses?: No  Position Activity Restriction  Other position/activity restrictions: IV, Tele    Subjective   General  Patient assessed for rehabilitation services?: Yes  Family / Caregiver Present: Yes (Daughter arrived during session)  Subjective  Subjective: Pt reports \"I want to go home but I don't feel well\"  Pt reports feeling nauseated, constipated, bloated  General Comment  Comments: Pt presents awake sitting EOB with nurse present  Patient Currently in Pain: No  Vital Signs  Patient Currently in Pain: No  Oxygen Therapy  SpO2: 97 %  Pulse Oximeter Device Mode: Intermittent  Pulse Oximeter Device Location: Right;Finger  Social/Functional History  Social/Functional History  Lives With: Alone  Type of Home: Apartment  Home Layout: One level  Home Access: Level entry  Bathroom Shower/Tub:  (Tub with cut out)  Bathroom Toilet: Handicap height  Bathroom Equipment: Grab bars around toilet, Grab bars in shower, Built-in shower seat, Toilet raiser, Hand-held shower  Home Equipment: Rolling walker, Wheelchair-manual (Pull cord in bathroom that alerts other residents)  Receives Help From: Family  ADL Assistance: Needs assistance  Bath: Supervision (Pt reports daughter is normally there when she bathes)  Dressing: Modified independent  Grooming: Modified independent   Feeding: Independent  Toileting: Independent  Homemaking Assistance: Needs assistance  Homemaking Responsibilities: Yes  Meal Prep Responsibility: Primary  Laundry Responsibility: No (Daughter goes laundry)  Cleaning Responsibility: No (Daughter does cleaning)  Shopping Responsibility: No (Daughter takes her to the store and pushes her in [facility])  Ambulation Assistance: Needs assistance (ambulates with a walker)  Transfer Assistance: Independent  Active : No  Leisure & Hobbies: Pt has 3 cats she takes care of. Additional Comments: Pt reports she has 2 daughters that lives in town and help her when needed. Objective   Vision: Impaired  Vision Exceptions: Wears glasses for reading  Hearing: Within functional limits    Orientation  Overall Orientation Status: Within Functional Limits  Observation/Palpation  Observation: Pt awake sitting EOB with nurse present, with IV and Tele  Balance  Sitting Balance: Stand by assistance  Standing Balance: Contact guard assistance  Standing Balance  Activity: Pt trf from EOB to toilet 2ft, then amb from toilet around bed to sit in chair ~6ft, using RW with CGA. Toilet Transfers  Toilet - Technique: Ambulating  Equipment Used: Standard toilet  Toilet Transfer: Minimal assistance  Toilet Transfers Comments: Min A sit to stand, CGA stand to sit on low toilet w/o rails  ADL  Feeding: Modified independent ;Setup (Pt opened and drank can of pop)  Grooming: Setup;Stand by assistance (Pt cleaned hands wipes after s/u seated in chair)  UE Bathing: Stand by assistance  LE Bathing: Minimal assistance  UE Dressing: Stand by assistance  LE Dressing:  Moderate assistance  Toileting: Contact guard assistance (Pt toileted and performed gregg care with SBA/CGA as Pt leaned forward onto walker then stood up for wiping)  Additional Comments: Based on observation of Pt performance or current functional mobility, strength and endurance status  Tone RUE  RUE Tone: Normotonic  Tone LUE  LUE Tone: Normotonic  Coordination  Movements Are Fluid And Coordinated: Yes     Bed mobility  Comment: Not assessed, Pt seen out of bed  Transfers  Stand Step Transfers: Contact guard assistance  Sit to stand: Minimal assistance  Stand to sit: Contact guard assistance  Vision - Basic Assessment  Prior Vision: Wears glasses only for reading  Cognition  Overall Cognitive Status: WFL        Sensation  Overall Sensation Status: Impaired (Pt reports numbness in L hand from hx of shoulder injury)        LUE AROM (degrees)  LUE AROM : WFL  RUE AROM (degrees)  RUE AROM : WFL  LUE Strength  Gross LUE Strength: WFL  RUE Strength  Gross RUE Strength: WFL                   Plan   Plan  Times per week: 2+  Current Treatment Recommendations: Strengthening, Balance Training, Endurance Training, Patient/Caregiver Education & Training, Equipment Evaluation, Education, & procurement, Self-Care / ADL, Safety Education & Training      Goals  Short term goals  Time Frame for Short term goals: Until DC or goals met  Short term goal 1: Pt will complete trfs using RW mod I  Short term goal 2: Pt will complete UE/LE dressing mod I  Short term goal 3: Pt will complete UE/LE bathing with SBA  Short term goal 4: Pt will complete toileting mod I  Short term goal 5: Pt will complete BUE therex 10+ min to increase strength/endurance to perform transfers/ADLs. Patient Goals   Patient goals : To return home       Therapy Time   Individual Concurrent Group Co-treatment   Time In 0900         Time Out 0930         Minutes 30         Timed Code Treatment Minutes: 15 Minutes       Romina Mcdaniels, OTR/L [id number]

## 2021-07-05 NOTE — PLAN OF CARE
Problem: Falls - Risk of:  Goal: Will remain free from falls  Description: Will remain free from falls  7/5/2021 0857 by Noah Finnegan RN  Outcome: Ongoing  7/4/2021 1933 by Lynne Vargas RN  Outcome: Ongoing  Goal: Absence of physical injury  Description: Absence of physical injury  7/5/2021 0857 by Noah Finnegan RN  Outcome: Ongoing  7/4/2021 1933 by Lynne Vargas RN  Outcome: Ongoing     Problem: Diarrhea:  Goal: Bowel elimination is within specified parameters  Description: Bowel elimination is within specified parameters  7/5/2021 0857 by Noah Finnegan RN  Outcome: Ongoing  7/4/2021 1933 by Lynne Vargas RN  Outcome: Ongoing  Goal: Passage of soft, formed stool  Description: Passage of soft, formed stool  7/5/2021 0857 by Noah Finnegan RN  Outcome: Ongoing  7/4/2021 1933 by Lynne Vargas RN  Outcome: Ongoing  Goal: Establishment of normal bowel function will improve to within specified parameters  Description: Establishment of normal bowel function will improve to within specified parameters  7/5/2021 0857 by Noah Finnegan RN  Outcome: Ongoing  7/4/2021 1933 by Lynne Vargas RN  Outcome: Ongoing

## 2021-07-05 NOTE — PROGRESS NOTES
This RN called MOLLY Pete again to notify that patient is not responding to breathing treatment. No orders received . States he will be back to listen to patient again.      Fermin Shane RN  7:49 PM  '

## 2021-07-05 NOTE — PROGRESS NOTES
RN called for a breathing tx on pt. Pt has crackles throughout and audibly. NPC. Pt states she is urinating less then normal. Breathing tx did not help, per pt. RN called MOLLY Paredes.

## 2021-07-05 NOTE — PROGRESS NOTES
While giving handoff report patient was coughing and sounding congested. Patient did not sound congested earlier today or even earlier this evening. Fluids were stopped and Lamar Mills RN called Dr. Nora Garcia. Per Aleshia Estrada, Dr. Nora Garcia said to stop the fluids and place patient on oxygen and to monitor for improvement.

## 2021-07-05 NOTE — PROGRESS NOTES
Clinical Pharmacy Note       Warfarin Consult    Consult received from Dr. Aubrey Christian to manage Warfarin therapy. Subjective: Trevon Ramos is a 82 y.o. female ordered Warfarin. Patient Active Problem List   Diagnosis    Nocturnal leg cramps    Restless leg syndrome, controlled    HTN (hypertension)    Hypothyroid    Vertigo    Acquired hypothyroidism    Paroxysmal atrial fibrillation (HCC)    Encounter for monitoring flecainide therapy    Easy bruising    Heart murmur    Aftercare following right hip joint replacement surgery    Primary osteoarthritis of right hip    Age-related nuclear cataract of right eye    Age-related nuclear cataract of left eye    JAZMYN (acute kidney injury) ([address]    Hypokalemia    Diarrhea    Lactic acidosis    Hypomagnesemia    Severe muscle deconditioning    Chronic anticoagulation    Bradycardia, drug induced     Allergies: Patient has no known allergies. Home dose: 4mg daily  Current dose: 5mg  Indication: Atrial fibrillation  INR goal: 2-3    Objective:  INR Results:  INR   Date Value Ref Range Status   07/05/2021 1.63 INDEX Final     Comment:     THERAPEUTIC RANGE:  INDICATIONS: INR 2.0-3.0  Most (DVT, PE,  Atrial Fibillation, Bioprosthetic valve,   [place] Judes bicuspid aortic valve)  INDICATIONS: 2.5-3.5 Most mechanical valves  recurrent thrombosis. 07/04/2021 1.28 INDEX Final     Comment:     THERAPEUTIC RANGE:  INDICATIONS: INR 2.0-3.0  Most (DVT, PE,  Atrial Fibillation, Bioprosthetic valve,   [place] Judes bicuspid aortic valve)  INDICATIONS: 2.5-3.5 Most mechanical valves  recurrent thrombosis. 06/26/2021 1.62 INDEX Final     Comment:     THERAPEUTIC RANGE:  INDICATIONS: INR 2.0-3.0  Most (DVT, PE,  Atrial Fibillation, Bioprosthetic valve,   [place] Judes bicuspid aortic valve)  INDICATIONS: 2.5-3.5 Most mechanical valves  recurrent thrombosis.          Recent Labs     07/03/21  1442 07/03/21  1442 07/04/21  0700 07/04/21  0700 07/05/21  0600   HGB 13.0   < > 11.7*   < > 11.1* HCT 40.8   < > 36.2*   < > 34.8*     --  296  --  282    < > = values in this interval not displayed. Other anticoagulants: none    Assessment: INR subtherapeutic today at 1.63 but trending up appropriately. Continue same dose for now. Daily INR ordered? yes    Plan: Warfarin 5mg tonight. Thank you for the consult. Pharmacy will continue to follow.     MARIELY Guerrier [facility] PharmD, [facility] 7/5/2021 at 8:36 AM

## 2021-07-06 VITALS
BODY MASS INDEX: 25.3 KG/M2 | OXYGEN SATURATION: 96 % | SYSTOLIC BLOOD PRESSURE: 111 MMHG | HEIGHT: 64 IN | WEIGHT: 148.2 LBS | DIASTOLIC BLOOD PRESSURE: 57 MMHG | HEART RATE: 52 BPM | RESPIRATION RATE: 14 BRPM | TEMPERATURE: 98.6 F

## 2021-07-06 LAB
INR BLD: 2.06 INDEX
PROTHROMBIN TIME: 25.9 SECONDS (ref 11.7–14.5)

## 2021-07-06 PROCEDURE — 97162 PT EVAL MOD COMPLEX 30 MIN: CPT

## 2021-07-06 PROCEDURE — G0378 HOSPITAL OBSERVATION PER HR: HCPCS

## 2021-07-06 PROCEDURE — 97535 SELF CARE MNGMENT TRAINING: CPT

## 2021-07-06 PROCEDURE — 94761 N-INVAS EAR/PLS OXIMETRY MLT: CPT

## 2021-07-06 PROCEDURE — 85610 PROTHROMBIN TIME: CPT

## 2021-07-06 PROCEDURE — 97530 THERAPEUTIC ACTIVITIES: CPT

## 2021-07-06 PROCEDURE — 36415 COLL VENOUS BLD VENIPUNCTURE: CPT

## 2021-07-06 PROCEDURE — 2580000003 HC RX 258: Performed by: INTERNAL MEDICINE

## 2021-07-06 PROCEDURE — 6370000000 HC RX 637 (ALT 250 FOR IP): Performed by: INTERNAL MEDICINE

## 2021-07-06 RX ORDER — POTASSIUM CHLORIDE 20 MEQ/1
20 TABLET, EXTENDED RELEASE ORAL DAILY PRN
Qty: 60 TABLET | Refills: 3 | Status: SHIPPED | OUTPATIENT
Start: 2021-07-06

## 2021-07-06 RX ORDER — GREEN TEA/HOODIA GORDONII 315-12.5MG
1 CAPSULE ORAL DAILY
Qty: 30 TABLET | Refills: 0
Start: 2021-07-06 | End: 2021-08-05

## 2021-07-06 RX ORDER — CHOLESTYRAMINE 4 G/9G
1 POWDER, FOR SUSPENSION ORAL DAILY PRN
Qty: 5 PACKET | Refills: 0 | Status: SHIPPED | OUTPATIENT
Start: 2021-07-06

## 2021-07-06 RX ORDER — FUROSEMIDE 40 MG/1
40 TABLET ORAL DAILY PRN
Qty: 60 TABLET | Refills: 3
Start: 2021-07-06

## 2021-07-06 RX ORDER — WARFARIN SODIUM 2.5 MG/1
2.5 TABLET ORAL
Status: DISCONTINUED | OUTPATIENT
Start: 2021-07-06 | End: 2021-07-06 | Stop reason: HOSPADM

## 2021-07-06 RX ORDER — WARFARIN SODIUM 4 MG/1
TABLET ORAL
Qty: 1 TABLET | Refills: 0
Start: 2021-07-06

## 2021-07-06 RX ADMIN — ACETAMINOPHEN 650 MG: 325 TABLET ORAL at 03:35

## 2021-07-06 RX ADMIN — SODIUM CHLORIDE, PRESERVATIVE FREE 10 ML: 5 INJECTION INTRAVENOUS at 08:12

## 2021-07-06 RX ADMIN — LEVOTHYROXINE SODIUM 75 MCG: 0.07 TABLET ORAL at 05:31

## 2021-07-06 RX ADMIN — FLECAINIDE ACETATE 50 MG: 50 TABLET ORAL at 08:09

## 2021-07-06 RX ADMIN — PANTOPRAZOLE SODIUM 40 MG: 40 TABLET, DELAYED RELEASE ORAL at 05:31

## 2021-07-06 RX ADMIN — POTASSIUM CHLORIDE 20 MEQ: 20 TABLET, EXTENDED RELEASE ORAL at 08:09

## 2021-07-06 RX ADMIN — BACLOFEN 10 MG: 10 TABLET ORAL at 08:09

## 2021-07-06 ASSESSMENT — PAIN DESCRIPTION - FREQUENCY: FREQUENCY: INTERMITTENT

## 2021-07-06 ASSESSMENT — PAIN DESCRIPTION - DESCRIPTORS: DESCRIPTORS: ACHING

## 2021-07-06 ASSESSMENT — PAIN SCALES - GENERAL
PAINLEVEL_OUTOF10: 0
PAINLEVEL_OUTOF10: 3
PAINLEVEL_OUTOF10: 0

## 2021-07-06 ASSESSMENT — PAIN DESCRIPTION - ONSET: ONSET: SUDDEN

## 2021-07-06 ASSESSMENT — PAIN - FUNCTIONAL ASSESSMENT: PAIN_FUNCTIONAL_ASSESSMENT: PREVENTS OR INTERFERES SOME ACTIVE ACTIVITIES AND ADLS

## 2021-07-06 ASSESSMENT — PAIN DESCRIPTION - LOCATION: LOCATION: HIP

## 2021-07-06 ASSESSMENT — PAIN DESCRIPTION - PAIN TYPE: TYPE: CHRONIC PAIN

## 2021-07-06 ASSESSMENT — PAIN DESCRIPTION - ORIENTATION: ORIENTATION: LEFT

## 2021-07-06 ASSESSMENT — PAIN DESCRIPTION - PROGRESSION: CLINICAL_PROGRESSION: GRADUALLY WORSENING

## 2021-07-06 NOTE — CONSULTS
S: I was called to the floor to evaluate this patient for shortness of breath, and wet lung sounds. The nurses were concerned about potential respiratory distress, initially the fluids were held at the time of the call. O: Patient was found in mild discomfort, coughing, on 2 L of oxygen nasal cannula, vital signs stable, lung sounds were showing crackles at the bases and at the apex consistent with fluid overload. I reached out to the telemedicine physician on-call, we discussed the case, reviewed her echocardiogram from October 2020 which showed an EF of 55%, reviewed the intake and outtake section of the patient's chart, after discussion with the nurses the patient had 3 L of fluids today, 3 L of fluids yesterday, no record of amount of output was recorded for today. Based on the patient's condition we elected to give 40 mg of Lasix IV, we did order a chest x-ray and a BMP, subsequent review of the BMP showed that it was elevated at 2175. Plan is to reassess in 2 to 3 hours to determine if she needs more Lasix and reassess tomorrow morning.     Seamus Manriquez PA-C

## 2021-07-06 NOTE — PROGRESS NOTES
Occupational Therapy Treatment Note      Name: Chriss Piper MRN: [id number] :   1935   Date:  2021   Admission Date: 7/3/2021 Room:  [address] [place] [address]     Primary Problem:  persistent diarrhea, weakness    Restrictions/Precautions:  Restrictions/Precautions  Restrictions/Precautions: Fall Risk, General Precautions  Required Braces or Orthoses?: No     Communication with other providers:  OK to see per nurse    Subjective:  Patient states:  \"I want to get home\"  Pain:  (location, type, intensity)  0/10 Pt denies pain but reports feeling sick to her stomach    Objective:    Observation:  Pt presents awake supine in bed HOB elevated with daughter present  Objective Measures:  Pt seen for ADL training and functional mobility    Treatment, including education:    Transfers:  Supine to sit: SBA with HOB elevated, bedrail assist  Sit to supine: Mod A to lift BLE up onto bed  Sit to stand: CGA as Pt leaned forward onto walker to stand   Stand to sit: CGA  Toilet trf: CGA    ADL training:  Pt refused to do a sponge bath at this time but daughter reported she would help her later. Pt completed toileting with SBA    Therapeutic activity:   Pt completed bed mobility to sit EOB . Pt trf ~2ft from EOB to toilet with RW and SBA/CGA. Pt refused to sit up in chair at this time and wanted to lay back in bed. Pt trf back from toilet to EOB using RW with CGA. Discussed thoughts on DC with Pt/daughter. Pt reports other than her stomach not feeling well she feels like she could go home. Therapist expressed concerns with Pt's weakness and Pt/uriah report she has a small apt and doesn't have far to walk and she gets help from her 2 daughters and son each day. Pt reported she had home therapy 1x in the past and didn't care for it. Informed Pt she will continue to be seen by therapy while she is here and will continue to monitor her progress.         Assessment / Impression:    Patient's tolerance of treatment: Fair  Adverse Reaction: None  Significant change in status and impact:  none  Barriers to improvement: Stomach pain    Short term goals  Time Frame for Short term goals: Until DC or goals met  Short term goal 1: Pt will complete trfs using RW mod I  Short term goal 2: Pt will complete UE/LE dressing mod I  Short term goal 3: Pt will complete UE/LE bathing with SBA  Short term goal 4: Pt will complete toileting mod I  Short term goal 5: Pt will complete BUE therex 10+ min to increase strength/endurance to perform transfers/ADLs. Patient Goals   Patient goals : To return home    Plan for Next Session:    Continue current POC    Time in:  0954  Time out:  1017  Timed treatment minutes:  23  Total treatment time:  23      Electronically signed by:    Deion Lomax.  JUAN Charles/L [id number],   7/6/2021, 10:00 AM

## 2021-07-06 NOTE — PROGRESS NOTES
Patient discharged home with daughters. IV removed with no complications and telemetry removed. Discharge instructions reviewed with the daughter and patient at bedside and discussed with patient's other daughter at the car. All needs met at time of discharge and patient was taken out to the car via wheelchair.

## 2021-07-06 NOTE — PROGRESS NOTES
Clinical Pharmacy Note       Warfarin Consult    Consult received from Dr. Deuce Jimenez to manage Warfarin therapy. Subjective: Micky Jean-Baptiste is a 82 y.o. female ordered Warfarin. Patient Active Problem List   Diagnosis    Nocturnal leg cramps    Restless leg syndrome, controlled    HTN (hypertension)    Hypothyroid    Vertigo    Acquired hypothyroidism    Paroxysmal atrial fibrillation (HCC)    Encounter for monitoring flecainide therapy    Easy bruising    Heart murmur    Aftercare following right hip joint replacement surgery    Primary osteoarthritis of right hip    Age-related nuclear cataract of right eye    Age-related nuclear cataract of left eye    JAZMYN (acute kidney injury) ([address]    Hypokalemia    Diarrhea    Lactic acidosis    Hypomagnesemia    Severe muscle deconditioning    Chronic anticoagulation    Bradycardia, drug induced     Allergies: Patient has no known allergies. Home dose: 4mg daily  Current dose: 5mg  Indication: Atrial fibrillation  INR goal: 2-3    Objective:  INR Results:  INR   Date Value Ref Range Status   07/06/2021 2.06 INDEX Final     Comment:     THERAPEUTIC RANGE:  INDICATIONS: INR 2.0-3.0  Most (DVT, PE,  Atrial Fibillation, Bioprosthetic valve,   [place] Judes bicuspid aortic valve)  INDICATIONS: 2.5-3.5 Most mechanical valves  recurrent thrombosis. 07/05/2021 1.63 INDEX Final     Comment:     THERAPEUTIC RANGE:  INDICATIONS: INR 2.0-3.0  Most (DVT, PE,  Atrial Fibillation, Bioprosthetic valve,   [place] Judes bicuspid aortic valve)  INDICATIONS: 2.5-3.5 Most mechanical valves  recurrent thrombosis. 07/04/2021 1.28 INDEX Final     Comment:     THERAPEUTIC RANGE:  INDICATIONS: INR 2.0-3.0  Most (DVT, PE,  Atrial Fibillation, Bioprosthetic valve,   St Judes bicuspid aortic valve)  INDICATIONS: 2.5-3.5 Most mechanical valves  recurrent thrombosis.          Recent Labs     07/03/21  1442 07/03/21  1442 07/04/21  0700 07/04/21  0700 07/05/21  0600   HGB 13.0   < > 11.7*   < > 11.1* HCT 40.8   < > 36.2*   < > 34.8*     --  296  --  282    < > = values in this interval not displayed. Other anticoagulants: none    Assessment: INR therapeutic today at 2.06 but rising rapidly. Give one time reduced dose. Daily INR ordered? yes    Plan: Warfarin 2.5mg tonight. Thank you for the consult. Pharmacy will continue to follow.     Susana Mantilla, [facility] PharmD, [facility] 7/6/2021 at 8:59 AM

## 2021-07-06 NOTE — PROGRESS NOTES
Physical Therapy    Facility/Department: [facility] UNIT  Initial Assessment    NAME: Cipriano Chan  : 1935  MRN: [id number]    Date of Service: 2021    Discharge Recommendations:  Home with Home health PT, Patient would benefit from continued therapy after discharge   PT Equipment Recommendations  Equipment Needed: No    Assessment   Body structures, Functions, Activity limitations: Decreased functional mobility ; Decreased high-level IADLs;Decreased endurance;Decreased posture;Decreased ROM; Decreased strength;Decreased balance; Increased pain  Assessment: Patient is a 82year old female who presented to hospital with persistent diarrhea and abomdinal cramping. At PT eval she presents with generalized weakness, impaired balance, and decreased strength. Pt will benefit from continued skilled PT to address these impairments. Prognosis: Fair  Decision Making: Medium Complexity  History: see below  Exam: see below  Clinical Presentation: evolving  PT Education: Transfer Training;Equipment;Disease Specific Education;PT Role;Functional Mobility Training;Plan of Care;Home Exercise Program. Discussed recommedation of home health PT at discharge to assist in improving mobility and strength. Discussed role of PT while inpatient to maintain level of mobility. Pt voices understanding. Barriers to Learning: none  REQUIRES PT FOLLOW UP: Yes  Activity Tolerance  Activity Tolerance: Patient limited by fatigue       Patient Diagnosis(es): The primary encounter diagnosis was Diarrhea, unspecified type. Diagnoses of Generalized weakness and Elevated lactic acid level were also pertinent to this visit. has a past medical history of Arthritis, Diverticulitis, Easy bruising, H/O cardiovascular stress test, H/O echocardiogram, Heart murmur, History of Holter monitoring, Hyperlipidemia, Hypertension, Leg cramps, PAF (paroxysmal atrial fibrillation) ([address] and Thyroid disease.    has a past surgical history that includes Cholecystectomy; Thyroid surgery; Abdomen surgery; Colonoscopy; joint replacement; joint replacement; Eye surgery (06/2018); Cataract removal with implant (Right, 06/14/2019); Intracapsular cataract extraction (Right, 6/14/2019); Cataract removal with implant (Left, 06/28/2019); and Intracapsular cataract extraction (Left, 6/28/2019).     Restrictions  Restrictions/Precautions  Restrictions/Precautions: Fall Risk, General Precautions  Required Braces or Orthoses?: No  Position Activity Restriction  Other position/activity restrictions: Tele, room air  Vision/Hearing        Subjective  General  Chart Reviewed: Yes  Patient assessed for rehabilitation services?: Yes  Family / Caregiver Present: Yes (daughter)  Follows Commands: Within Functional Limits  Subjective  Subjective: Pt reports \"i'm frustrated that i'm not feeling well\"  Pain Screening  Patient Currently in Pain: Other (comment) (Denies at rest, c/o aching R arthritic hip pain)  Vital Signs  Patient Currently in Pain: Other (comment) (Denies at rest, c/o aching R arthritic hip pain)       Orientation  Orientation  Overall Orientation Status: Within Functional Limits  Social/Functional History  Social/Functional History  Lives With: Alone  Type of Home: Apartment  Home Layout: One level  Home Access: Level entry  Bathroom Shower/Tub:  (Tub with cut out)  Bathroom Toilet: Handicap height  Bathroom Equipment: Grab bars around toilet, Grab bars in shower, Built-in shower seat, Toilet raiser, Hand-held shower  Home Equipment: Rolling walker, Wheelchair-manual (Pull cord in bathroom that alerts other residents)  Receives Help From: Family  ADL Assistance: Needs assistance  Bath: Supervision (Pt reports daughter is normally there when she bathes)  Dressing: Modified independent  Grooming: Modified independent   Feeding: Independent  Toileting: Independent  Homemaking Assistance: Needs assistance  Homemaking Responsibilities: Yes  Meal Prep Responsibility: Primary  Laundry Responsibility: No (Daughter goes laundry)  Cleaning Responsibility: No (Daughter does cleaning)  Shopping Responsibility: No (Daughter takes her to the store and pushes her in [facility])  Ambulation Assistance: Needs assistance (ambulates with a walker)  Transfer Assistance: Independent  Active : No  Leisure & Hobbies: Pt has 3 cats she takes care of. Additional Comments: Pt reports she has 2 daughters that lives in town and help her when needed. Cognition - WFL       Objective     Observation/Palpation  Observation: Pt on commode, daughter in room, on tele    AROM RLE (degrees)  RLE General AROM: WFL, however all R hip ROM is painful  AROM LLE (degrees)  LLE AROM : WFL  Strength RLE  Comment: Grossly 4-/5 throughout hip, knee, ankle  Strength LLE  Comment: Grossly 4/5 hip, knee, ankle  Tone RLE  RLE Tone: Normotonic  Tone LLE  LLE Tone: Normotonic  Motor Control  Gross Motor?: WFL  Sensation  Overall Sensation Status: Impaired (Pt reports numbness in L hand from hx of shoulder injury)  Bed mobility  Sit to Supine: Minimal assistance  Scooting: Stand by assistance  Comment: Pt requires min assist for RLE into bed from sitting due to pain. Pt reports she does not have difficulty with her bed at home. Transfers  Sit to Stand: Stand by assistance  Stand to sit: Stand by assistance  Comment: Assessed from commode and bed. Pt requires B UE assist for sit to stand with increased time to complete with flexed posture. Ambulation  Ambulation?: Yes  Ambulation 1  Surface: level tile  Device: Rolling Walker  Assistance: Stand by assistance  Quality of Gait: Increased thoracic kyphosis on walker, slow gait speed. Gait Deviations: Shuffles;Decreased head and trunk rotation;Decreased step height;Decreased step length; Slow Jimena     Balance  Posture: Poor  Sitting - Static: Good  Sitting - Dynamic: Fair;+  Standing - Static: Fair  Standing - Dynamic: Fair  Exercises  Heelslides: x10 each  Gluteal Sets: x10 in a hospital room? []1 []2   [x]3    []4  17 42.13 50.57% CK        18 43.63 46.58% CK   Climbing 3-5 steps with a railing?  []1  []2   [x]3    []4  19 45.44 41.77% CK        20 47.67 35.83% CJ   Raw Score  18 21 50.25 28.97% CJ   Standardized Score  43.63 22 53.28 20.91% CJ   CMS 0-100% Score 46.58%  23 56.93 11.20% CI   CMS Modifier CK 24 61.14 0.00% CH     CH = 0% impaired  CI = 1-20% impaired  CJ = 20-40% impaired  CK = 40-60% impaired  CL = 60-80% impaired  CM = % impaired  CN = 100% impaired    Goals  Short term goals  Time Frame for Short term goals: until d/c  Short term goal 1: Pt will perform supine <> sit with SUP, HOB flat  Short term goal 2: Pt will perform sit to stands from bed, chair, commode with supervision  Short term goal 3: Pt will ambulate 40 ft with RW and SBA       Therapy Time   Individual Concurrent Group Co-treatment   Time In 1028         Time Out 1051         Minutes 23         Timed Code Treatment Minutes: 10 Minutes 1 TA       Alek Bustamante, PT DPT

## 2021-07-06 NOTE — CARE COORDINATION
Chart reviewed and CM met with the patient at bedside. Patient lives at home alone with her three cats she rescued. She lives at [facility]. She states she has lived alone sense her   of lung cancer about 30 years ago. The patient states she has good support with her son and two daughters. She states she does not drive but her family helps her get grocery's and go to the doctor. She takes her medications as prescribed. She states she uses a walker to get around at home. She has insurance and a PCP. She states is independent in other ADL's. Cm talked to the patient about HHC but she states that her kids take good care of her. She states she does not want to complete any rehab and does not want [facility] at this time. Cm notes that the patient told this CM that her   of lung cancer and he was a  and a smoker about 3 times. She does not seem to remember that she has told me the story before. She states her stomach pain is better, but her when her bowels move, its yellow.  She is unsure if this is normal.

## 2021-07-06 NOTE — DISCHARGE SUMMARY
Discharge Summary      Patient ID: Ysamani Vazquez      Patient's PCP: Pam Little MD    Admit Date: 7/3/2021     Discharge Date:  7/6/21    Admitting Provider: Nyla Rivas MD    Discharging Provider: Shira Bishop DO     Reason for this admission:   Dehydration, diarrhea, lactic acidosis    Discharge Diagnoses: Active Hospital Problems    Diagnosis Date Noted    Severe muscle deconditioning [R29.898] 07/03/2021    Chronic anticoagulation [Z79.01] 07/03/2021    Bradycardia, drug induced [R00.1, T50.905A] 07/03/2021    Diarrhea [R19.7] 06/24/2021    Lactic acidosis [E87.2] 06/24/2021    Paroxysmal atrial fibrillation (HCC) [I48.0] 05/10/2016    Acquired hypothyroidism [E03.9]     HTN (hypertension) [I10] 03/22/2013       Consults:   IP CONSULT TO PHARMACY  IP CONSULT TO CASE MANAGEMENT      Briefly: On admission, \"82 y.o. female with significant past medical history of essential hypertension, PAF with chronic anticoagulation with Coumadin, hypothyroidism acquired through surgery, GERD and mild dementia; who presents with recurrent diarrhea with associated crampy abdominal pain, and was found to have elevated lactic acid level of 3. Patient was therefore admitted for further work-up and management. Of note is a recent admission between 6/24 and 6/26 when patient had presented with similar diarrhea and abdominal crampy pain, was found then to have JAZMYN, hypokalemia and hypomagnesemia. This time around, her renal function is within normal limits and all her electrolyte imbalances have corrected. [facility] Course:   Patient admitted, rehydrated with IVF and lactic acidosis resolved. She continued to have intermittent yellow loose stools and stool culture panel is negative. Her lasix was held to avoid further dehydration and her electrolytes remained stable/repleted. It is unclear if her diarrhea may be due to prilosec, prior cholecystectomy or from eating fatty restaurant foods.   Patient is being discharge with a trial of cholestyramine packet. She is refusing home health and has family that will be checking in on her frequently. She is being discharge in stable and improved condiiton. Active Hospital Problems    Diagnosis Date Noted    Severe muscle deconditioning [R29.898] 07/03/2021    Chronic anticoagulation [Z79.01] 07/03/2021    Bradycardia, drug induced [R00.1, T50.905A] 07/03/2021    Diarrhea [R19.7] 06/24/2021    Lactic acidosis [E87.2] 06/24/2021    Paroxysmal atrial fibrillation (HCC) [I48.0] 05/10/2016    Acquired hypothyroidism [E03.9]     HTN (hypertension) [I10] 03/22/2013       Disposition: home    Discharged Condition: Stable    Vital Signs  Temp: 98.6 °F (37 °C)  Pulse: 52  Resp: 14  BP: (!) 111/57  SpO2: 96 %  O2 Device: None (Room air)  O2 Flow Rate (L/min): 2 L/min    Vital signs reviewed in electronic chart. Physical exam  Constitutional:  Well developed, well nourished, no acute distress. Respiratory:  No respiratory distress, no wheezing, rales or rhonchi detected. Cardiovascular:  Normal rate, normal rhythm, with murmur  GI:  Soft, nondistended, normal bowel sounds, nontender, no hepatosplenomegaly appreciated. Musculoskeletal:  No edema,. Activity: activity as tolerated  Diet: low fat, low cholesterol diet  Follow Up: Primary Care Physician in 1 week to check INR and BMP and follow up on diarrhea/electrolytes  Patient to proceed with the following lab - INR/BMP in 1 weeks    Labs:  For convenience and continuity at follow-up the following most recent labs are provided:    CBC:   Lab Results   Component Value Date    WBC 7.0 07/05/2021    HGB 11.1 07/05/2021    HCT 34.8 07/05/2021     07/05/2021       RENAL:   Lab Results   Component Value Date     07/05/2021    K 3.8 07/05/2021     07/05/2021    CO2 24 07/05/2021    BUN 9 07/05/2021    CREATININE 0.9 07/05/2021     Results for Montserrat Cintron (MRN [id number]) as of 7/6/2021 12:32   Ref. Range 7/6/2021 07:00   INR Latest Units: INDEX 2.06     Results for Li Womack (MRN [id number]) as of 7/6/2021 12:32   Ref. Range 6/25/2021 10:00   Lactoferrin, Qual Latest Ref Range: NEGATIVE  POSITIVE (A)   Results for Li Womack (MRN [id number]) as of 7/6/2021 12:32   Ref.  Range 6/24/2021 22:15 6/25/2021 10:00 7/3/2021 13:45   Campylobacter PCR Latest Ref Range: NOT DETECTED    NOT DETECTED   Yersinia Enterocolitica PCR Latest Ref Range: NOT DETECTED    NOT DETECTED   Cryptosporidium Antigen Latest Ref Range: NEGATIVE  NEGATIVE     Cryptosporidium PCR Latest Ref Range: NOT DETECTED    NOT DETECTED   Giardia Ag, Stl Latest Ref Range: NEGATIVE  NEGATIVE     O&P PANEL ([employer]AVEL [employer]) #1 Unknown Rpt     Rotavirus Latest Ref Range: NEGATIVE   NEGATIVE    Clostridium difficile, PCR Latest Ref Range: PATIENT IS NEGATIVE FOR C DIFFICILE   PATIENT IS NEGATIVE FOR C DIFFICILE    Giardia Lamblia PCR Latest Ref Range: NOT DETECTED    NOT DETECTED     Discharge Medications:     Current Discharge Medication List           Details   Probiotic Acidophilus (FLORANEX) TABS Take 1 tablet by mouth daily Or may use ativia yogurt instead  Qty: 30 tablet, Refills: 0      cholestyramine (QUESTRAN) 4 g packet Take 1 packet by mouth daily as needed (diarrhea)  Qty: 5 packet, Refills: 0              Details   warfarin (COUMADIN) 4 MG tablet 2 mg (1/2 pill) on even days, 4 mg (1 pill) on odd days  Qty: 1 tablet, Refills: 0      metoprolol tartrate (LOPRESSOR) 25 MG tablet Take 0.5 tablets by mouth 2 times daily Do not take if pulse is below 60 or SBP is under 100  Qty: 60 tablet, Refills: 0      furosemide (LASIX) 40 MG tablet Take 1 tablet by mouth daily as needed (leg swelling or weight gain more than 2 pounds overnight)  Qty: 60 tablet, Refills: 3      potassium chloride (KLOR-CON M) 20 MEQ extended release tablet Take 1 tablet by mouth daily as needed (if using lasix or if diarrhea)  Qty: 60 tablet, Refills: 3              Details   flecainide (TAMBOCOR) 50 MG tablet Take 1 tablet by mouth 2 times daily  Qty: 180 tablet, Refills: 3      levothyroxine (SYNTHROID) 75 MCG tablet Take 75 mcg by mouth Daily      donepezil (ARICEPT) 10 MG tablet Take 10 mg by mouth nightly      baclofen (LIORESAL) 10 MG tablet Take 10 mg by mouth 2 times daily                Total discharge time 35 minutes  Signed:  Electronically signed by Isreal Conley DO on 7/6/2021 at 1:05 PM       Thank you Jayme Aguilar MD for the opportunity to be involved in this patient's care. If you have any questions or concerns please feel free to contact me at [phone].

## 2021-07-06 NOTE — PROGRESS NOTES
Per patient report, patient drank one can of [employer] today.    Mendez Oates RN  8:52 PM

## 2021-11-10 ENCOUNTER — APPOINTMENT (OUTPATIENT)
Dept: GENERAL RADIOLOGY | Age: 86
End: 2021-11-10
Payer: MEDICARE

## 2021-11-10 ENCOUNTER — HOSPITAL ENCOUNTER (EMERGENCY)
Age: 86
Discharge: HOME | End: 2021-11-10
Attending: EMERGENCY MEDICINE
Payer: MEDICARE

## 2021-11-10 VITALS
DIASTOLIC BLOOD PRESSURE: 68 MMHG | RESPIRATION RATE: 18 BRPM | OXYGEN SATURATION: 98 % | WEIGHT: 146 LBS | SYSTOLIC BLOOD PRESSURE: 153 MMHG | TEMPERATURE: 96.3 F | HEART RATE: 60 BPM | HEIGHT: 66 IN | BODY MASS INDEX: 23.46 KG/M2

## 2021-11-10 DIAGNOSIS — M10.9 GOUTY ARTHROPATHY: Primary | ICD-10-CM

## 2021-11-10 PROCEDURE — 6370000000 HC RX 637 (ALT 250 FOR IP): Performed by: EMERGENCY MEDICINE

## 2021-11-10 PROCEDURE — 73110 X-RAY EXAM OF WRIST: CPT

## 2021-11-10 PROCEDURE — 99283 EMERGENCY DEPT VISIT LOW MDM: CPT

## 2021-11-10 RX ORDER — CEPHALEXIN 500 MG/1
500 CAPSULE ORAL ONCE
Status: COMPLETED | OUTPATIENT
Start: 2021-11-10 | End: 2021-11-10

## 2021-11-10 RX ORDER — PREDNISONE 10 MG/1
20 TABLET ORAL 2 TIMES DAILY
Qty: 20 TABLET | Refills: 0 | Status: SHIPPED | OUTPATIENT
Start: 2021-11-10 | End: 2021-11-15

## 2021-11-10 RX ORDER — CEPHALEXIN 500 MG/1
500 CAPSULE ORAL 4 TIMES DAILY
Qty: 28 CAPSULE | Refills: 0 | Status: SHIPPED | OUTPATIENT
Start: 2021-11-10 | End: 2021-11-17

## 2021-11-10 RX ORDER — PREDNISONE 20 MG/1
20 TABLET ORAL ONCE
Status: COMPLETED | OUTPATIENT
Start: 2021-11-10 | End: 2021-11-10

## 2021-11-10 RX ADMIN — PREDNISONE 20 MG: 20 TABLET ORAL at 18:47

## 2021-11-10 RX ADMIN — CEPHALEXIN 500 MG: 500 CAPSULE ORAL at 18:47

## 2021-11-10 ASSESSMENT — PAIN DESCRIPTION - ONSET: ONSET: ON-GOING

## 2021-11-10 ASSESSMENT — PAIN DESCRIPTION - PAIN TYPE: TYPE: ACUTE PAIN

## 2021-11-10 ASSESSMENT — PAIN SCALES - GENERAL: PAINLEVEL_OUTOF10: 10

## 2021-11-10 ASSESSMENT — PAIN DESCRIPTION - LOCATION: LOCATION: WRIST

## 2021-11-10 ASSESSMENT — PAIN DESCRIPTION - ORIENTATION: ORIENTATION: LEFT

## 2021-11-10 ASSESSMENT — PAIN DESCRIPTION - DESCRIPTORS: DESCRIPTORS: DULL

## 2021-11-10 ASSESSMENT — PAIN DESCRIPTION - FREQUENCY: FREQUENCY: CONTINUOUS

## 2021-11-10 NOTE — ED PROVIDER NOTES
eMERGENCY dEPARTMENT eNCOUnter      PCP: Mary Rivas MD    [address] [facility]    Chief Complaint   Patient presents with    Wrist Pain     Woke up with L wrist swollen and red. Pt unsure what happened. Pt denies falling. HPI    Giovanni Ramos is a 82 y.o. female who presents with Left wrist pain. Onset was yesterday. The context is no injury, states maybe she was bit by something. The pain is localized to the dorsal wrist region. The pain severity is moderate. The patient has no associated other injuries. The pain is aggravated by wrist movement and direct palpation. No other injuries. No distal numbness, tingling, weakness. Reports associated swelling and redness. Denies fever or chills. Denies nausea or vomiting. No feeling of systemic illness. REVIEW OF SYSTEMS    General: Denies fever or chills  Cardiac: Denies chest pain or chestwall pain. Pulmonary: Denies shortness of breath  GI: Denies abdominal pain, vomiting, or diarrhea  Neuro: No numbness, weakness    Denies any other muscles skeletal injuries, including elbow, shoulder,chest wall and back. All other review of systems are negative  See HPI and nursing notes for additional information     PAST MEDICAL & SURGICAL HISTORY    Past Medical History:   Diagnosis Date    Arthritis     Diverticulitis     Easy bruising 5/10/2016    H/O cardiovascular stress test 4/23/16    Normal stress test. EF 88%    H/O echocardiogram 4/23/16; 10/15/2020    EF 55-60%. Sclerotic aortic valve with mild stenosis. Mild MR & TR. Mild mitral stenosis with a mean gradient of 4mmHg.  Heart murmur 6/26/2018    Mild aortic stenosis.  History of Holter monitoring 6/9/2016    Normal Holter findings suggestive of normal sinus rhythm w/rare complex supraventricular ectopy without any clinically significant arrhythmias.     Hyperlipidemia     Hypertension     Leg cramps     PAF (paroxysmal atrial fibrillation) ([facility] [address] 5/10/2016    Controled on flecainide     Thyroid disease      Past Surgical History:   Procedure Laterality Date    ABDOMEN SURGERY      colon surgery, had diverticulitis, had colon resection    CATARACT REMOVAL WITH IMPLANT Right 06/14/2019    by Dr. Bennett Olson Left 06/28/2019    by Dr. Kasia Moffett  06/2018    INTRACAPSULAR CATARACT EXTRACTION Right 6/14/2019    EYE CATARACT EMULSIFICATION IOL IMPLANT performed by Liu Roberts MD at [address] [place] [address] Left 6/28/2019    EYE CATARACT EMULSIFICATION IOL IMPLANT performed by Liu Roberts MD at [facility]      elvia knee replacement    JOINT REPLACEMENT      left shoulder replacement    THYROID SURGERY      goiter       CURRENT MEDICATIONS    Current Outpatient Rx   Medication Sig Dispense Refill    flecainide (TAMBOCOR) 50 MG tablet Take 1 tablet by mouth 2 times daily 180 tablet 0    warfarin (COUMADIN) 4 MG tablet 2 mg (1/2 pill) on even days, 4 mg (1 pill) on odd days 1 tablet 0    metoprolol tartrate (LOPRESSOR) 25 MG tablet Take 0.5 tablets by mouth 2 times daily Do not take if pulse is below 60 or SBP is under 100 60 tablet 0    furosemide (LASIX) 40 MG tablet Take 1 tablet by mouth daily as needed (leg swelling or weight gain more than 2 pounds overnight) 60 tablet 3    potassium chloride (KLOR-CON M) 20 MEQ extended release tablet Take 1 tablet by mouth daily as needed (if using lasix or if diarrhea) 60 tablet 3    cholestyramine (QUESTRAN) 4 g packet Take 1 packet by mouth daily as needed (diarrhea) 5 packet 0    levothyroxine (SYNTHROID) 75 MCG tablet Take 75 mcg by mouth Daily      donepezil (ARICEPT) 10 MG tablet Take 10 mg by mouth nightly      baclofen (LIORESAL) 10 MG tablet Take 10 mg by mouth 2 times daily         ALLERGIES    No Known Allergies    SOCIAL & FAMILY HISTORY    Social History     Socioeconomic History    Marital status:   Spouse name: None    Number of children: None    Years of education: None    Highest education level: None   Occupational History    None   Tobacco Use    Smoking status: Never Smoker    Smokeless tobacco: Never Used   Vaping Use    Vaping Use: Never used   Substance and Sexual Activity    Alcohol use: No    Drug use: No    Sexual activity: Never   Other Topics Concern    None   Social History Narrative    None     Social Determinants of Health     Financial Resource Strain:     Difficulty of Paying Living Expenses: Not on file   Food Insecurity:     Worried About Running Out of Food in the Last Year: Not on file    Caroline of Food in the Last Year: Not on file   Transportation Needs:     Lack of Transportation (Medical): Not on file    Lack of Transportation (Non-Medical):  Not on file   Physical Activity:     Days of Exercise per Week: Not on file    Minutes of Exercise per Session: Not on file   Stress:     Feeling of Stress : Not on file   Social Connections:     Frequency of Communication with Friends and Family: Not on file    Frequency of Social Gatherings with Friends and Family: Not on file    Attends Denominational Services: Not on file    Active Member of [address] [place] [address] or Organizations: Not on file    Attends Club or Organization Meetings: Not on file    Marital Status: Not on file   Intimate Partner Violence:     Fear of Current or Ex-Partner: Not on file    Emotionally Abused: Not on file    Physically Abused: Not on file    Sexually Abused: Not on file   Housing Stability:     Unable to Pay for Housing in the Last Year: Not on file    Number of [place] in the Last Year: Not on file    Unstable Housing in the Last Year: Not on file     Family History   Problem Relation Age of Onset    Cancer Mother     Cancer Brother     Cancer Other            PHYSICAL EXAM    VITAL SIGNS: BP (!) 153/68   Pulse 60   Temp 96.3 °F (35.7 °C) (Oral)   Resp 18   Ht 5' 6\" (1.676 m)   Wt 146 lb (66.2 kg)   SpO2 98%   BMI 23.57 kg/m²   Constitutional:  Well developed, well nourished, no acute distress, non-toxic appearance   HENT:  Atraumatic, normocephalic    Musculoskeletal:    Left  Wrist:  There is moderate swelling over the dorsal aspect with associated redness and warmth. Redness not circumferential.  This region is tender to palpation. Range of motion limited due to pain. Distal sensation and capillary refill intact. Redness and swelling does extend slightly onto the dorsum of the hand. Elbow, including radial head is non-tender. No swelling, discoloration, or tenderness to palpation of the ipsilateral elbow and hand/fingers. Distal motor, sensation, capillary refill intact. Integument:  Well hydrated, no rash. skin intact  Vascular: affected extremity distally neurovascularly intact - sensation and capillary refill intact. Neurologic:  Awake alert, normal flow of speech   Psychiatric: Cooperative, pleasant affect    RADIOLOGY/PROCEDURES    Left Wrist  Xrays:  XR WRIST LEFT (MIN 3 VIEWS)    Result Date: 11/10/2021  EXAMINATION: 3 XRAY VIEWS OF THE LEFT WRIST 11/10/2021 5:33 pm COMPARISON: None. HISTORY: ORDERING SYSTEM PROVIDED HISTORY: wrist redness and swelling TECHNOLOGIST PROVIDED HISTORY: Reason for exam:->wrist redness and swelling Acuity: Acute Type of Exam: Initial Additional signs and symptoms: wrist redness and swelling FINDINGS: Three views of the left wrist were reviewed. Bones are osteopenic. 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 Cortical irregularity of the distal left radius and acute fracture would be difficult to exclude if there is any history of trauma. In the absence of recent trauma, findings favored to be chronic. Nonspecific soft tissue edema. No subcutaneous gas. Severe atherosclerotic disease. 1. Nonspecific subcutaneous edema with no subcutaneous gas identified. 2. Slight cortical irregularity of the distal radius. Acute fracture would be difficult to exclude if there is any history of trauma. In the absence of recent trauma, findings favored to be chronic. 3. Severe 1st [facility] joint and triscaphe joint osteoarthritis. ED COURSE & MEDICAL DECISION MAKING       Vital signs and nursing notes reviewed during ED course. I have independently evaluated this patient . All pertinent Lab data and radiographic results reviewed with patient at bedside. The patient and/or the family were informed of the results of any tests/labs/imaging, the treatment plan, and time was allotted to answer questions. This is an 82-year-old female who presented with left wrist redness, swelling and pain. X-ray was performed. Subcutaneous edema present. There is cortical irregularity of the distal radius, given the lack of trauma I suspect this is likely chronic. Given the appearance of erythema and the swelling along with the location of it I suspected gouty arthropathy. I am concerned about the possibility of cellulitis, thus will cover with Keflex. Will treat for gouty arthropathy, cover with Keflex for the possibility of cellulitis and have her follow-up with in 48 hours with primary care provider for recheck. If she cannot get in there she is instructed return here for reevaluation. Patient and family member at the bedside voiced understanding the discharge instructions and return precautions. Differential diagnosis: includes but not limited to ligamentous injury, tendon injury, soft tissue contusion/hematoma, fracture, dislocation, vascular injury, cellulitis, septic arthritis, gouty arthropathy, necrotizing fasciitis    The likelihood of other entities in the differential is insufficient to justify any further testing for them. This was explained to the patient. The patient was advised that persistent or worsening symptoms would require further evaluation.     Clinical  IMPRESSION    Gouty arthropathy      Diagnosis and plan discussed in detail with patient who understands and agrees. Patient agrees to return emergency department if symptoms worsen or any new symptoms develop.     (Please note the MDM and HPI sections of this note were completed with a voice recognition program.  Efforts were made to edit the dictations but occasionally words are mis-transcribed.)      Tyrese Barksdale DO  11/10/21 [id number]

## 2021-11-23 NOTE — TELEPHONE ENCOUNTER
Will pend to Dr Nicolas Pablo to review. Patient is scheduled for follow up for bradycardia at [facility] office 12/1/2021 with Dr Nicolas Pablo.

## 2021-12-01 ENCOUNTER — OFFICE VISIT (OUTPATIENT)
Dept: CARDIOLOGY CLINIC | Age: 86
End: 2021-12-01
Payer: MEDICARE

## 2021-12-01 VITALS
DIASTOLIC BLOOD PRESSURE: 70 MMHG | BODY MASS INDEX: 24.75 KG/M2 | HEIGHT: 64 IN | SYSTOLIC BLOOD PRESSURE: 124 MMHG | HEART RATE: 60 BPM | WEIGHT: 145 LBS | OXYGEN SATURATION: 100 %

## 2021-12-01 DIAGNOSIS — I35.0 AORTIC STENOSIS, MILD: ICD-10-CM

## 2021-12-01 DIAGNOSIS — I10 PRIMARY HYPERTENSION: Chronic | ICD-10-CM

## 2021-12-01 DIAGNOSIS — Z79.899 ENCOUNTER FOR MONITORING FLECAINIDE THERAPY: ICD-10-CM

## 2021-12-01 DIAGNOSIS — Z51.81 ENCOUNTER FOR MONITORING FLECAINIDE THERAPY: ICD-10-CM

## 2021-12-01 DIAGNOSIS — I48.0 PAROXYSMAL ATRIAL FIBRILLATION (HCC): Primary | ICD-10-CM

## 2021-12-01 PROCEDURE — 99214 OFFICE O/P EST MOD 30 MIN: CPT | Performed by: INTERNAL MEDICINE

## 2021-12-01 RX ORDER — FLECAINIDE ACETATE 50 MG/1
50 TABLET ORAL 2 TIMES DAILY
Qty: 180 TABLET | Refills: 3 | Status: SHIPPED | OUTPATIENT
Start: 2021-12-01

## 2021-12-01 NOTE — ASSESSMENT & PLAN NOTE
Clinically stable and unchanged from last year physical examination findings of heart murmur. We will continue to monitor clinically.

## 2021-12-01 NOTE — PROGRESS NOTES
Ronald Ahumada (:  1935) is a 82 y.o. female,     Chief Complaint   Patient presents with    1 Year Follow Up     Pt denies any new cradiac sx, no surgeries or procedures scheduled that she is aware of , pt did not bring medications or medication list to [facility] appt    Atrial Fibrillation     Patient is here for follow up for history of paroxysmal atrial fibrillation on flecainide and Toprol and has hypertension disabling bilateral hip arthritis status post replacements and valvular heart disease. She denies any cardiac symptoms. She walks with a walker at home and she is in a wheelchair today in the office because she could not walk long hallways. Denies any shortness of breath or palpitations. No Known Allergies  Prior to Admission medications    Medication Sig Start Date End Date Taking?  Authorizing Provider   flecainide (TAMBOCOR) 50 MG tablet Take 1 tablet by mouth 2 times daily 21  Yes Becca Mike MD   metoprolol tartrate (LOPRESSOR) 25 MG tablet TAKE 1/2 TABLET BY MOUTH TWICE A DAY 21   Becca Mike MD   warfarin (COUMADIN) 4 MG tablet 2 mg (1/2 pill) on even days, 4 mg (1 pill) on odd days 21   Zonia Grigsby DO   furosemide (LASIX) 40 MG tablet Take 1 tablet by mouth daily as needed (leg swelling or weight gain more than 2 pounds overnight) 21   Zonia Grigsby DO   potassium chloride (KLOR-CON M) 20 MEQ extended release tablet Take 1 tablet by mouth daily as needed (if using lasix or if diarrhea) 21   Zonia Grigsby DO   cholestyramine Lenette Dorothy) 4 g packet Take 1 packet by mouth daily as needed (diarrhea) 21   Zonia Grigsby DO   levothyroxine (SYNTHROID) 75 MCG tablet Take 75 mcg by mouth Daily    Historical Provider, MD   donepezil (ARICEPT) 10 MG tablet Take 10 mg by mouth nightly    Historical Provider, MD   baclofen (LIORESAL) 10 MG tablet Take 10 mg by mouth 2 times daily    Historical Provider, MD     Past Medical History:   Diagnosis Date    Aortic or tenderness. No organomegaly noted. Musculoskeletal: Kyphotic spinal deformities noted. Gait is unsteady and unstable walks with a walker. Muscle strength is normal for age. Neurologic:  Oriented to time, place, and person and non-anxious. No focal neurological deficit noted. Psychiatric: Normal mood and effect. July 4, 2021 EKG showed normal sinus rhythm left axis deviation rate is 57 bpm QTC was 473 ms.    10/2020 Echo reported  Technically difficult examination Patient   Left ventricular function is normal, EF is estimated at 55-60%. Normal diastolic filling pattern for age. Mildly dilated left atrium. Sclerotic aortic valve with mild stenosis. Mitral annular calcification is present. Mild mitral stenosis with a mean gradient of 4mmHg. Mild mitral and tricuspid regurgitation is present. No evidence of pericardial effusion. Pertinent records reviewed and discussed with patient and results are as follow:    Lab Results   Component Value Date    WBC 7.0 07/05/2021    HGB 11.1 07/05/2021    HCT 34.8 07/05/2021     07/05/2021     Lab Results   Component Value Date    CHOL 120 08/05/2013    TRIG 167 (H) 08/05/2013    HDL 44 08/05/2013    LDLCALC 43 08/05/2013     Lab Results   Component Value Date    BUN 9 07/05/2021    CREATININE 0.9 07/05/2021     07/05/2021    K 3.8 07/05/2021     Lab Results   Component Value Date    INR 2.06 07/06/2021     ASSESSMENT/PLAN:    1. Paroxysmal atrial fibrillation (HCC)  Assessment & Plan:  Well-controlled on flecainide 50 twice a day. She is tolerating it well and continue the same. 2. Primary hypertension  Assessment & Plan:  Well-controlled on Toprol 25 mg daily and Lasix and potassium. Continue the same   3. Encounter for monitoring flecainide therapy  Assessment & Plan: Tolerating it well. We will repeat EKG to assess QT C in 6 months and follow-up.    4. Aortic stenosis, mild  Assessment & Plan:  Clinically stable and unchanged from last year physical examination findings of heart murmur. We will continue to monitor clinically. Continue current cardiovascular medications which have been reviewed and discussed individually with you. Continue maximum fall precautions. PCP follows PT/INR. Follow-up in 6 months with EKG, sooner if needed. An electronic signature was used to authenticate this note.     --Becca Mike MD

## 2021-12-01 NOTE — PATIENT INSTRUCTIONS
Please be informed that if you contact our office outside of normal business hours the physician on call cannot help with any scheduling or rescheduling issues, procedure instruction questions or any type of medication issue. We advise you for any urgent/emergency that you go to the nearest emergency room! PLEASE CALL OUR OFFICE DURING NORMAL BUSINESS HOURS    Monday - Friday   8 am to 5 pm    [place]: [phone]    [place]:  [phone]  **It is YOUR responsibilty to bring medication bottles and/or updated medication list to [address] [place] [address]. This will allow us to better serve you and all your healthcare needs**  Continue current cardiovascular medications which have been reviewed and discussed individually with you. Continue maximum fall precautions. PCP follows PT/INR. Follow-up in 6 months with EKG, sooner if needed.

## 2022-03-23 ENCOUNTER — APPOINTMENT (OUTPATIENT)
Dept: CT IMAGING | Age: 87
End: 2022-03-23
Payer: MEDICARE

## 2022-03-23 ENCOUNTER — APPOINTMENT (OUTPATIENT)
Dept: GENERAL RADIOLOGY | Age: 87
End: 2022-03-23
Payer: MEDICARE

## 2022-03-23 ENCOUNTER — HOSPITAL ENCOUNTER (EMERGENCY)
Age: 87
Discharge: HOME | End: 2022-03-23
Attending: EMERGENCY MEDICINE
Payer: MEDICARE

## 2022-03-23 VITALS
WEIGHT: 145 LBS | SYSTOLIC BLOOD PRESSURE: 136 MMHG | BODY MASS INDEX: 24.75 KG/M2 | DIASTOLIC BLOOD PRESSURE: 66 MMHG | HEART RATE: 59 BPM | OXYGEN SATURATION: 99 % | HEIGHT: 64 IN | RESPIRATION RATE: 19 BRPM | TEMPERATURE: 98.4 F

## 2022-03-23 DIAGNOSIS — S09.90XA INJURY OF HEAD, INITIAL ENCOUNTER: ICD-10-CM
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
## 2022-03-23 DIAGNOSIS — M25.552 LEFT HIP PAIN: ICD-10-CM

## 2022-03-23 LAB
ALBUMIN SERPL-MCNC: 3.9 GM/DL (ref 3.4–5)
ALP BLD-CCNC: 79 IU/L (ref 40–129)
ALT SERPL-CCNC: 12 U/L (ref 10–40)
ANION GAP SERPL CALCULATED.3IONS-SCNC: 13 MMOL/L (ref 4–16)
APTT: 42 SECONDS (ref 25.1–37.1)
APTT: 66.2 SECONDS (ref 25.1–37.1)
AST SERPL-CCNC: 15 IU/L (ref 15–37)
BASOPHILS ABSOLUTE: 0.1 K/CU MM
BASOPHILS RELATIVE PERCENT: 1 % (ref 0–1)
BILIRUB SERPL-MCNC: 0.7 MG/DL (ref 0–1)
BUN BLDV-MCNC: 18 MG/DL (ref 6–23)
CALCIUM SERPL-MCNC: 8.8 MG/DL (ref 8.3–10.6)
CHLORIDE BLD-SCNC: 95 MMOL/L (ref 99–110)
CO2: 22 MMOL/L (ref 21–32)
CREAT SERPL-MCNC: 0.9 MG/DL (ref 0.6–1.1)
DIFFERENTIAL TYPE: ABNORMAL
EKG ATRIAL RATE: 58 BPM
EKG DIAGNOSIS: NORMAL
EKG P AXIS: 10 DEGREES
EKG P-R INTERVAL: 220 MS
EKG Q-T INTERVAL: 448 MS
EKG QRS DURATION: 78 MS
EKG QTC CALCULATION (BAZETT): 439 MS
EKG R AXIS: -21 DEGREES
EKG T AXIS: 38 DEGREES
EKG VENTRICULAR RATE: 58 BPM
EOSINOPHILS ABSOLUTE: 0.6 K/CU MM
EOSINOPHILS RELATIVE PERCENT: 7.6 % (ref 0–3)
GFR AFRICAN AMERICAN: >60 ML/MIN/1.73M2
GFR NON-AFRICAN AMERICAN: 60 ML/MIN/1.73M2
GLUCOSE BLD-MCNC: 94 MG/DL (ref 70–99)
HCT VFR BLD CALC: 43.3 % (ref 37–47)
HEMOGLOBIN: 14.2 GM/DL (ref 12.5–16)
IMMATURE NEUTROPHIL %: 0.4 % (ref 0–0.43)
INR BLD: 1.71 INDEX
INR BLD: 1.86 INDEX
LYMPHOCYTES ABSOLUTE: 1.9 K/CU MM
LYMPHOCYTES RELATIVE PERCENT: 24.7 % (ref 24–44)
MAGNESIUM: 2.1 MG/DL (ref 1.8–2.4)
MCH RBC QN AUTO: 29.8 PG (ref 27–31)
MCHC RBC AUTO-ENTMCNC: 32.8 % (ref 32–36)
MCV RBC AUTO: 91 FL (ref 78–100)
MONOCYTES ABSOLUTE: 0.7 K/CU MM
MONOCYTES RELATIVE PERCENT: 9 % (ref 0–4)
PDW BLD-RTO: 15.7 % (ref 11.7–14.9)
PLATELET # BLD: 146 K/CU MM (ref 140–440)
PMV BLD AUTO: 9.6 FL (ref 7.5–11.1)
POTASSIUM SERPL-SCNC: 4.5 MMOL/L (ref 3.5–5.1)
PROTHROMBIN TIME: 21.3 SECONDS (ref 11.7–14.5)
PROTHROMBIN TIME: 23.2 SECONDS (ref 11.7–14.5)
RBC # BLD: 4.76 M/CU MM (ref 4.2–5.4)
SEGMENTED NEUTROPHILS ABSOLUTE COUNT: 4.5 K/CU MM
SEGMENTED NEUTROPHILS RELATIVE PERCENT: 57.3 % (ref 36–66)
SODIUM BLD-SCNC: 130 MMOL/L (ref 135–145)
TOTAL IMMATURE NEUTOROPHIL: 0.03 K/CU MM
TOTAL PROTEIN: 6.2 GM/DL (ref 6.4–8.2)
WBC # BLD: 7.8 K/CU MM (ref 4–10.5)

## 2022-03-23 PROCEDURE — 83735 ASSAY OF MAGNESIUM: CPT

## 2022-03-23 PROCEDURE — 99284 EMERGENCY DEPT VISIT MOD MDM: CPT

## 2022-03-23 PROCEDURE — 73502 X-RAY EXAM HIP UNI 2-3 VIEWS: CPT

## 2022-03-23 PROCEDURE — 36415 COLL VENOUS BLD VENIPUNCTURE: CPT

## 2022-03-23 PROCEDURE — 85730 THROMBOPLASTIN TIME PARTIAL: CPT

## 2022-03-23 PROCEDURE — 70450 CT HEAD/BRAIN W/O DYE: CPT

## 2022-03-23 PROCEDURE — 72125 CT NECK SPINE W/O DYE: CPT

## 2022-03-23 PROCEDURE — 85025 COMPLETE CBC W/AUTO DIFF WBC: CPT

## 2022-03-23 PROCEDURE — 71045 X-RAY EXAM CHEST 1 VIEW: CPT

## 2022-03-23 PROCEDURE — 93010 ELECTROCARDIOGRAM REPORT: CPT | Performed by: INTERNAL MEDICINE

## 2022-03-23 PROCEDURE — 80053 COMPREHEN METABOLIC PANEL: CPT

## 2022-03-23 PROCEDURE — 85610 PROTHROMBIN TIME: CPT

## 2022-03-23 PROCEDURE — 93005 ELECTROCARDIOGRAM TRACING: CPT | Performed by: EMERGENCY MEDICINE

## 2022-03-23 PROCEDURE — 73562 X-RAY EXAM OF KNEE 3: CPT

## 2022-03-23 RX ORDER — MEMANTINE HYDROCHLORIDE 5 MG/1
TABLET ORAL
COMMUNITY
Start: 2021-09-24

## 2022-03-23 ASSESSMENT — PAIN DESCRIPTION - ORIENTATION: ORIENTATION: LEFT

## 2022-03-23 ASSESSMENT — PAIN DESCRIPTION - LOCATION: LOCATION: HIP

## 2022-03-23 ASSESSMENT — PAIN DESCRIPTION - PAIN TYPE: TYPE: ACUTE PAIN

## 2022-03-23 ASSESSMENT — PAIN DESCRIPTION - FREQUENCY: FREQUENCY: CONTINUOUS

## 2022-03-23 ASSESSMENT — PAIN - FUNCTIONAL ASSESSMENT: PAIN_FUNCTIONAL_ASSESSMENT: 0-10

## 2022-03-23 NOTE — ED TRIAGE NOTES
Arrived per W/C to room 4 for triage. Tolerated without difficulty. Bed in lowest position. Call light given. Gowned for exam. Monitor applied.

## 2022-03-23 NOTE — ED NOTES
Discharge instructions reviewed with patient and patients daughter. No additional questions asked. Voiced understanding. Encouraged patient to follow up as discussed by the ED physician.      Marci Parker RN  03/23/22 1337

## 2022-03-23 NOTE — ED PROVIDER NOTES
Emergency Department Encounter    Patient: Serena Holt  MRN: [id number]  : 1935  Date of Evaluation: 3/23/2022  ED Provider:  Conchis Ch MD    Triage Chief Complaint:   Serena Torres Ada this morning around 7860. C/O pain in her left hip and left arm. States also has a knot on the back of her head. States felt weird this morning when she got up. Also c/o her heart beating faster.  ), Hip Pain, and Head Injury    [place]:  Serena Holt is a 82 y.o. female that presents with concern for a fall. She got up around midnight, could not fall back asleep. States that it was just \"a bad morning\". She has been having some mornings where she has not felt as well recently. She did fall, states that she tripped. She landed on her left hip, left arm. She reports she has had a bad left shoulder but that is not bothering her. She does have a knot on her left hip, and had some pain at the knee as well. Was able to get up. She also struck her head, she is on blood thinners, when I reviewed the chart she is on warfarin for her paroxysmal atrial fibrillation. She did note that her heart was beating faster this morning. States that has been happening more and more often, but she had not told her daughters about it. Daughters brought her in for evaluation. She did not lose consciousness. Denies neck and back pain. Denies chest pain or difficulty breathing. No abdominal pain. No nausea or vomiting or vision changes. Denies other complaints. ROS - see HPI, below listed is current ROS at time of my eval:  10 systems reviewed negative except as above    Past Medical History:   Diagnosis Date    Aortic stenosis, mild 2021    By echo 2020    Arthritis     Diverticulitis     Easy bruising 5/10/2016    H/O cardiovascular stress test 16    Normal stress test. EF 88%    H/O echocardiogram 16; 10/15/2020    EF 55-60%. Sclerotic aortic valve with mild stenosis.  Mild MR & TR. Mild mitral Not on file   Food Insecurity:     Worried About [address] [facility] in the Last Year: Not on file    Caroline of Food in the Last Year: Not on file   Transportation Needs:     Lack of Transportation (Medical): Not on file    Lack of Transportation (Non-Medical): Not on file   Physical Activity:     Days of Exercise per Week: Not on file    Minutes of Exercise per Session: Not on file   Stress:     Feeling of Stress : Not on file   Social Connections:     Frequency of Communication with Friends and Family: Not on file    Frequency of Social Gatherings with Friends and Family: Not on file    Attends Jehovah's witness Services: Not on file    Active Member of [address] [place] [address] or Organizations: Not on file    Attends Club or Organization Meetings: Not on file    Marital Status: Not on file   Intimate Partner Violence:     Fear of Current or Ex-Partner: Not on file    Emotionally Abused: Not on file    Physically Abused: Not on file    Sexually Abused: Not on file   Housing Stability:     Unable to Pay for Housing in the Last Year: Not on file    Number of [place] in the Last Year: Not on file    Unstable Housing in the Last Year: Not on file     No current facility-administered medications for this encounter.      Current Outpatient Medications   Medication Sig Dispense Refill    memantine (NAMENDA) 5 MG tablet Take by mouth      flecainide (TAMBOCOR) 50 MG tablet Take 1 tablet by mouth 2 times daily 180 tablet 3    metoprolol tartrate (LOPRESSOR) 25 MG tablet TAKE 1/2 TABLET BY MOUTH TWICE A DAY 90 tablet 3    warfarin (COUMADIN) 4 MG tablet 2 mg (1/2 pill) on even days, 4 mg (1 pill) on odd days (Patient taking differently: 3 mg on even days, 4 mg (1 pill) on odd days) 1 tablet 0    furosemide (LASIX) 40 MG tablet Take 1 tablet by mouth daily as needed (leg swelling or weight gain more than 2 pounds overnight) 60 tablet 3    potassium chloride (KLOR-CON M) 20 MEQ extended release tablet Take 1 tablet by mouth daily as needed (if using lasix or if diarrhea) 60 tablet 3    cholestyramine (QUESTRAN) 4 g packet Take 1 packet by mouth daily as needed (diarrhea) 5 packet 0    levothyroxine (SYNTHROID) 75 MCG tablet Take 75 mcg by mouth Daily      donepezil (ARICEPT) 10 MG tablet Take 10 mg by mouth nightly      baclofen (LIORESAL) 10 MG tablet Take 10 mg by mouth 2 times daily       No Known Allergies    Nursing Notes Reviewed    Physical Exam:  Triage VS:    ED Triage Vitals   Enc Vitals Group      BP       Pulse       Resp       Temp       Temp src       SpO2       Weight       Height       Head Circumference       Peak Flow       Pain Score       Pain Loc       Pain Edu? Excl. in [address]? My pulse ox interpretation is - normal    General appearance:  No acute distress. Skin:  Warm. Dry. Eye:  Extraocular movements intact. Ears, nose, mouth and throat:  Oral mucosa moist   Neck:  Trachea midline. Extremity:  No swelling. + tender to palpation over left lateral hip and left knee without bruising noted. Normal ROM     Heart:  Regular rate and rhythm, normal S1 & S2, no extra heart sounds. Perfusion:  intact  Respiratory:  Lungs clear to auscultation bilaterally. Respirations nonlabored. Abdominal:   Soft. Nontender. Non distended. Back:  No C/T/L spine tenderness to palpation, no step offs or deformities. Neurological:  Alert and oriented, cranial nerves II through XII grossly intact, strength equal in all extremities, sensation intact light touch in all extremities.           Psychiatric:  Appropriate    I have reviewed and interpreted all of the currently available lab results from this visit (if applicable):  Results for orders placed or performed during the hospital encounter of 03/23/22   CBC with Auto Differential   Result Value Ref Range    WBC 7.8 4.0 - 10.5 K/CU MM    RBC 4.76 4.2 - 5.4 M/CU MM    Hemoglobin 14.2 12.5 - 16.0 GM/DL    Hematocrit 43.3 37 - 47 %    MCV 91.0 78 - 100 FL    MCH 29.8 27 - 31 PG    MCHC 32.8 32.0 - 36.0 %    RDW 15.7 (H) 11.7 - 14.9 %    Platelets 880 897 - 460 K/CU MM    MPV 9.6 7.5 - 11.1 FL    Differential Type AUTOMATED DIFFERENTIAL     Segs Relative 57.3 36 - 66 %    Lymphocytes % 24.7 24 - 44 %    Monocytes % 9.0 (H) 0 - 4 %    Eosinophils % 7.6 (H) 0 - 3 %    Basophils % 1.0 0 - 1 %    Segs Absolute 4.5 K/CU MM    Lymphocytes Absolute 1.9 K/CU MM    Monocytes Absolute 0.7 K/CU MM    Eosinophils Absolute 0.6 K/CU MM    Basophils Absolute 0.1 K/CU MM    Immature Neutrophil % 0.4 0 - 0.43 %    Total Immature Neutrophil 0.03 K/CU MM   Protime/INR & PTT   Result Value Ref Range    Protime 21.3 (H) 11.7 - 14.5 SECONDS    INR 1.71 INDEX    aPTT 42.0 (H) 25.1 - 37.1 SECONDS   Comprehensive Metabolic Panel   Result Value Ref Range    Sodium 130 (L) 135 - 145 MMOL/L    Potassium 4.5 3.5 - 5.1 MMOL/L    Chloride 95 (L) 99 - 110 mMol/L    CO2 22 21 - 32 MMOL/L    BUN 18 6 - 23 MG/DL    CREATININE 0.9 0.6 - 1.1 MG/DL    Glucose 94 70 - 99 MG/DL    Calcium 8.8 8.3 - 10.6 MG/DL    Albumin 3.9 3.4 - 5.0 GM/DL    Total Protein 6.2 (L) 6.4 - 8.2 GM/DL    Total Bilirubin 0.7 0.0 - 1.0 MG/DL    ALT 12 10 - 40 U/L    AST 15 15 - 37 IU/L    Alkaline Phosphatase 79 40 - 129 IU/L    GFR Non-African American 60 (L) >60 mL/min/1.73m2    GFR African American >60 >60 mL/min/1.73m2    Anion Gap 13 4 - 16   Magnesium   Result Value Ref Range    Magnesium 2.1 1.8 - 2.4 mg/dl   Protime-INR   Result Value Ref Range    Protime 23.2 (H) 11.7 - 14.5 SECONDS    INR 1.86 INDEX   APTT   Result Value Ref Range    aPTT 66.2 (H) 25.1 - 37.1 SECONDS   EKG 12 Lead   Result Value Ref Range    Ventricular Rate 58 BPM    Atrial Rate 58 BPM    P-R Interval 220 ms    QRS Duration 78 ms    Q-T Interval 448 ms    QTc Calculation (Bazett) 439 ms    P Axis 10 degrees    R Axis -21 degrees    T Axis 38 degrees    Diagnosis       Sinus bradycardia with 1st degree AV block  Moderate voltage criteria for LVH, may be normal variant  Borderline ECG  When compared with ECG of 04-JUL-2021 05:30,  QRS duration has decreased  T wave amplitude has increased in Inferior leads  T wave amplitude has increased in Lateral leads        Radiographs (if obtained):  Radiologist's Report Reviewed:  No results found. EKG (if obtained): (All EKG's are interpreted by myself in the absence of a cardiologist)        MDM:  81-year-old female with history as above presents with concern for a fall, hip and head injury. She also felt like her heart was beating faster earlier today, heart rate in the 50s to 60s here, I do see a history of paroxysmal A. fib in the chart, she is on warfarin. Currently appears to be rate controlled and we will continue to monitor. We will check labs, get chest x-ray and pelvic x-ray with left hip dedicated. As well as left knee. We will also get a CT head and cervical spine given her age and that she is on blood thinners. She is comfortable with this plan, I discussed at length with her. Her daughter Sita Edwards is still at bedside, other daughter did have to go back to work. Patient was telling me about her cats, would very much like to be able to go home later today, feels safe at home, however we will see what her work-up looks like and how she is doing. She is comfortable with plan      Clinical Impression:  1. Fall, initial encounter    2. Injury of head, initial encounter    3.  Left hip pain      Disposition referral (if applicable):  Mario Long, [address] [facility]  [phone]    Schedule an appointment as soon as possible for a visit       [facility] Emergency Department  [address]  [phone]    If symptoms worsen    Disposition medications (if applicable):  New Prescriptions    No medications on file     ED Provider Disposition Time  DISPOSITION Discharge - Pending Orders Complete 03/23/2022 12:26:53 PM      Comment: Please note this report has been produced using speech recognition software and may contain errors related to that system including errors in grammar, punctuation, and spelling, as well as words and phrases that may be inappropriate. Efforts were made to edit the dictations. Janina Downey MD  03/23/22 1020      Patient's INR is 1.86, discussed with her and daughter, they get her INR tested every Sunday, Dr. Tej Nguyen had made some adjustments this Monday they think it was about the same, so I encouraged him to follow-up with Dr. Tej Nguyen and follow those directions then recheck this Monday. Her other labs appear to be stable, sodium of 130, kidney function is normal.  Her imaging is negative for any acute abnormality. She does have an old fibular head fracture, unchanged. She is able to stand and ambulate with a walker here, she uses a walker at home. She and daughter are comfortable with her going home. Did give her return precautions. At this time she will be discharged to follow-up with her primary care. Janina Downey MD  03/23/22 1244      EKG  Sinus bradycardia with first-degree AV block, rate of 58 bpm, normal intervals other than AR interval of 220 ms. No ST elevation. No previous to compare.      Janina Downey MD  03/23/22 1250

## 2022-03-23 NOTE — ED NOTES
Discharge instructions reviewed with patient and patients daughter. No additional questions asked. Voiced understanding. Encouraged patient to follow up as discussed by the ED physician.      Keith Graham RN  03/23/22 5897

## 2022-03-23 NOTE — ED NOTES
Patient ambulated in room/hallway with walker and stand by assist - tolerated well.       Ajay Dasilva, RN  03/23/22 3221

## 2022-03-30 ENCOUNTER — TELEPHONE (OUTPATIENT)
Dept: CARDIOLOGY CLINIC | Age: 87
End: 2022-03-30

## 2022-03-30 DIAGNOSIS — R55 SYNCOPE, UNSPECIFIED SYNCOPE TYPE: Primary | ICD-10-CM

## 2022-03-30 NOTE — TELEPHONE ENCOUNTER
Advised patient Dr Ilan Perez wants a 48 hour holter moniter applied and appt to see him for results. Patient said she will have her daughter call us to schedule appt. Because she is the one that brings her to appts.

## 2022-04-05 ENCOUNTER — NURSE ONLY (OUTPATIENT)
Dept: CARDIOLOGY CLINIC | Age: 87
End: 2022-04-05

## 2022-04-05 DIAGNOSIS — R55 SYNCOPE, UNSPECIFIED SYNCOPE TYPE: Primary | ICD-10-CM

## 2022-04-05 NOTE — PROGRESS NOTES
48 hour holter monitor applied 4/5/2022 @ 12:40 PM for [facility] Serial # [id number] . Instructed patient on monitor and proper use. Instructed on diary. When to remove and bring it back. Must leave the holter monitor on  without removing for the duration of time ordered. Answered all questions the patient had. Instructed patient to call [facility] at [phone] with any questions or concerns with the monitor.

## 2022-05-09 ENCOUNTER — OFFICE VISIT (OUTPATIENT)
Dept: CARDIOLOGY CLINIC | Age: 87
End: 2022-05-09
Payer: MEDICARE

## 2022-05-09 VITALS
DIASTOLIC BLOOD PRESSURE: 75 MMHG | BODY MASS INDEX: 24.98 KG/M2 | HEART RATE: 58 BPM | WEIGHT: 141 LBS | SYSTOLIC BLOOD PRESSURE: 122 MMHG | HEIGHT: 63 IN

## 2022-05-09 DIAGNOSIS — I10 PRIMARY HYPERTENSION: Chronic | ICD-10-CM

## 2022-05-09 DIAGNOSIS — R55 SYNCOPE AND COLLAPSE: ICD-10-CM

## 2022-05-09 DIAGNOSIS — I48.0 PAROXYSMAL ATRIAL FIBRILLATION (HCC): ICD-10-CM

## 2022-05-09 DIAGNOSIS — I35.0 AORTIC STENOSIS, MILD: Primary | ICD-10-CM

## 2022-05-09 PROCEDURE — 99214 OFFICE O/P EST MOD 30 MIN: CPT | Performed by: INTERNAL MEDICINE

## 2022-05-09 NOTE — PROGRESS NOTES
Greta Gandhi (:  1935) is a 82 y.o. female,     Chief Complaint   Patient presents with    Atrial Fibrillation     Denies CP, SOB, Dizziness, and Edema. Promotes Palpitations and SOBOE.  Hyperlipidemia    Hypertension     Patient is here for follow up for recent fall and syncope for which she had 48-hour cardiac monitoring done. She had 1 episode of dizziness last week but has not had syncope. She is accompanied by her daughter and she does give most of the history. She denies any palpitations or chest pains and she is not very active. She was having more palpitations when she was taken off of flecainide in July of last year but now she is taking the medication and does not have any palpitations. Holter results are discussed with her. No Known Allergies  Prior to Admission medications    Medication Sig Start Date End Date Taking?  Authorizing Provider   [facility]) 5 MG tablet Take by mouth 21  Yes Historical Provider, MD   flecainide (TAMBOCOR) 50 MG tablet Take 1 tablet by mouth 2 times daily 21  Yes Lindbergh Homans, MD   metoprolol tartrate (LOPRESSOR) 25 MG tablet TAKE 1/2 TABLET BY MOUTH TWICE A DAY 21  Yes Lindbergh Homans, MD   warfarin (COUMADIN) 4 MG tablet 2 mg (1/2 pill) on even days, 4 mg (1 pill) on odd days  Patient taking differently: 3 mg on even days, 4 mg (1 pill) on odd days 21  Yes Casey Estrada DO   levothyroxine (SYNTHROID) 75 MCG tablet Take 75 mcg by mouth Daily   Yes Historical Provider, MD   furosemide (LASIX) 40 MG tablet Take 1 tablet by mouth daily as needed (leg swelling or weight gain more than 2 pounds overnight)  Patient not taking: Reported on 2022   Casey Estrada DO   potassium chloride (KLOR-CON M) 20 MEQ extended release tablet Take 1 tablet by mouth daily as needed (if using lasix or if diarrhea)  Patient not taking: Reported on 2022   Casey Estrada DO   cholestyramine (QUESTRAN) 4 g packet Take 1 packet by mouth daily as needed (diarrhea)  Patient not taking: Reported on 5/9/2022 7/6/21   Tejal Nye DO   donepezil (ARICEPT) 10 MG tablet Take 10 mg by mouth nightly  Patient not taking: Reported on 5/9/2022    Historical Provider, MD   baclofen (LIORESAL) 10 MG tablet Take 10 mg by mouth 2 times daily  Patient not taking: Reported on 5/9/2022    Historical Provider, MD     Past Medical History:   Diagnosis Date    Aortic stenosis, mild 12/1/2021    By echo October 2020    Arthritis     Diverticulitis     Easy bruising 5/10/2016    H/O cardiovascular stress test 4/23/16    Normal stress test. EF 88%    H/O echocardiogram 4/23/16; 10/15/2020    EF 55-60%. Sclerotic aortic valve with mild stenosis. Mild MR & TR. Mild mitral stenosis with a mean gradient of 4mmHg.  Heart murmur 6/26/2018    Mild aortic stenosis.  History of Holter monitoring 6/9/2016    Normal Holter findings suggestive of normal sinus rhythm w/rare complex supraventricular ectopy without any clinically significant arrhythmias.  Hyperlipidemia     Hypertension     Leg cramps     PAF (paroxysmal atrial fibrillation) ([facility] [address] 5/10/2016    Controled on flecainide     Syncope and collapse 5/9/2022    Thyroid disease       Vitals:    05/09/22 1538   BP: 122/75   Pulse: 58   Weight: 141 lb (64 kg)   Height: 5' 3\" (1.6 m)      Body mass index is 24.98 kg/m². Wt Readings from Last 3 Encounters:   05/09/22 141 lb (64 kg)   03/23/22 145 lb (65.8 kg)   12/01/21 145 lb (65.8 kg)     Constitutional:  Patient is elderly female in no apparent distress was sitting in a wheelchair. HEENT: Pupils are equal no conjunctival pallor noted. Cardiovascular: Auscultation: Normal S1 and S2.  2/6 systolic murmur noted in aortic area left sternal border. Peripheral pulses: Pedal pulses are 1+. Respiratory:  Respiratory effort is normal. Breath sounds are clear to auscultation. Extremities:  No edema, clubbing,  Cyanosis, petechiae.    Neurologic:  Oriented to time, place, and person and non-anxious. No focal neurological deficit noted. Psychiatric: Normal mood and effect. Ambulatory cardiac monitoring on 3/30/2022 reported  48 hours of ambulatory cardiac monitoring done to evaluate syncope is consistent with normal sinus rhythm at average rate of 57 bpm.  Minimum rate of 43 bpm occurred at 4:14 AM and the maximal rate of 89 bpm occurred at 8:20 AM.  Rare isolated 215 PACs are noted without any complex arrhythmias.  No significant ventricle ectopy noted.  Patient did not submit a diary for symptom or activity correlation. Pertinent records reviewed and discussed with patient and results are as follow:    Lab Results   Component Value Date    WBC 7.8 03/23/2022    HGB 14.2 03/23/2022    HCT 43.3 03/23/2022     03/23/2022     Lab Results   Component Value Date    CHOL 120 08/05/2013    TRIG 167 (H) 08/05/2013    HDL 44 08/05/2013    LDLCALC 43 08/05/2013     Lab Results   Component Value Date    BUN 18 03/23/2022    CREATININE 0.9 03/23/2022     03/23/2022    K 4.5 03/23/2022     Lab Results   Component Value Date    INR 1.86 03/23/2022      ASSESSMENT/PLAN:    1. Aortic stenosis, mild  Assessment & Plan:  Repeat echocardiogram to evaluate aortic valve further. Orders:  -     ECHO Complete 2D W Doppler W Color; Future  2. Primary hypertension  Assessment & Plan:  Well-controlled on current medication continue the same. 3. Paroxysmal atrial fibrillation (HCC)  Assessment & Plan:  Continue flecainide and metoprolol. Patient is anticoagulated with warfarin. 4. Syncope and collapse  Assessment & Plan:  If she has frequent recurrences we will do Sharad monitoring. Orders:  -     ECHO Complete 2D W Doppler W Color; Future    Echocardiogram to assess valvular heart disease and syncope. Continue current cardiovascular medications which have been reviewed and discussed individually with you. Follow-up in 6 months, sooner if needed.           An electronic signature was used to authenticate this note.     --Elaina Amaro MD

## 2022-05-09 NOTE — PATIENT INSTRUCTIONS
Echocardiogram to assess valvular heart disease and syncope. Continue current cardiovascular medications which have been reviewed and discussed individually with you. Follow-up in 6 months, sooner if needed.

## 2022-05-18 ENCOUNTER — PROCEDURE VISIT (OUTPATIENT)
Dept: CARDIOLOGY CLINIC | Age: 87
End: 2022-05-18
Payer: MEDICARE

## 2022-05-18 DIAGNOSIS — R55 SYNCOPE AND COLLAPSE: ICD-10-CM

## 2022-05-18 DIAGNOSIS — I35.0 AORTIC STENOSIS, MILD: Primary | ICD-10-CM

## 2022-05-18 LAB
LV EF: 58 %
LVEF MODALITY: NORMAL

## 2022-05-18 PROCEDURE — 93306 TTE W/DOPPLER COMPLETE: CPT | Performed by: INTERNAL MEDICINE

## 2022-05-19 ENCOUNTER — TELEPHONE (OUTPATIENT)
Dept: CARDIOLOGY CLINIC | Age: 87
End: 2022-05-19

## 2022-12-27 RX ORDER — FLECAINIDE ACETATE 50 MG/1
50 TABLET ORAL 2 TIMES DAILY
Qty: 180 TABLET | Refills: 3 | Status: SHIPPED | OUTPATIENT
Start: 2022-12-27

## 2023-01-03 ENCOUNTER — OFFICE VISIT (OUTPATIENT)
Dept: CARDIOLOGY CLINIC | Age: 88
End: 2023-01-03
Payer: MEDICARE

## 2023-01-03 VITALS
RESPIRATION RATE: 16 BRPM | HEIGHT: 63 IN | DIASTOLIC BLOOD PRESSURE: 76 MMHG | SYSTOLIC BLOOD PRESSURE: 128 MMHG | WEIGHT: 149 LBS | BODY MASS INDEX: 26.4 KG/M2 | HEART RATE: 67 BPM

## 2023-01-03 DIAGNOSIS — Z51.81 ENCOUNTER FOR MONITORING FLECAINIDE THERAPY: ICD-10-CM

## 2023-01-03 DIAGNOSIS — I35.0 AORTIC STENOSIS, MILD: ICD-10-CM

## 2023-01-03 DIAGNOSIS — I48.0 PAROXYSMAL ATRIAL FIBRILLATION (HCC): Primary | ICD-10-CM

## 2023-01-03 DIAGNOSIS — Z79.899 ENCOUNTER FOR MONITORING FLECAINIDE THERAPY: ICD-10-CM

## 2023-01-03 PROCEDURE — 3074F SYST BP LT 130 MM HG: CPT | Performed by: INTERNAL MEDICINE

## 2023-01-03 PROCEDURE — 93000 ELECTROCARDIOGRAM COMPLETE: CPT | Performed by: INTERNAL MEDICINE

## 2023-01-03 PROCEDURE — 99214 OFFICE O/P EST MOD 30 MIN: CPT | Performed by: INTERNAL MEDICINE

## 2023-01-03 PROCEDURE — 1123F ACP DISCUSS/DSCN MKR DOCD: CPT | Performed by: INTERNAL MEDICINE

## 2023-01-03 PROCEDURE — 3078F DIAST BP <80 MM HG: CPT | Performed by: INTERNAL MEDICINE

## 2023-01-03 NOTE — PROGRESS NOTES
Kim Levy  1935  Jacque Booker MD      Chief Complaint   Patient presents with    Atrial Fibrillation    Hyperlipidemia    Hypertension     Pt denies any new cardiac concerns. Pt is non-smoker. Chief complaint and HPI:  Kim Levy  is a 82 y.o. female following up for paroxysmal afibrillation on flecainide therapy and has valvular heart disease. She is accompanied by her daughter. She lives alone with 3 cats and has gained some weight since last time and is denying any palpitations or dizziness or any cardiac symptoms. She has been compliant to her medications and her 2 daughters check on her regularly to help her with her medications. Rest of the Cardiovascular system review is otherwise unchanged from prior encounter. Past medical history:  has a past medical history of Aortic stenosis, mild, Arthritis, Diverticulitis, Easy bruising, H/O cardiovascular stress test, H/O Doppler echocardiogram, H/O echocardiogram, Heart murmur, History of Holter monitoring, Hyperlipidemia, Hypertension, Leg cramps, PAF (paroxysmal atrial fibrillation) ([address] Syncope and collapse, and Thyroid disease. Past surgical history:  has a past surgical history that includes Cholecystectomy; Thyroid surgery; Abdomen surgery; Colonoscopy; joint replacement; joint replacement; Eye surgery (06/2018); Cataract removal with implant (Right, 06/14/2019); Intracapsular cataract extraction (Right, 6/14/2019); Cataract removal with implant (Left, 06/28/2019); and Intracapsular cataract extraction (Left, 6/28/2019). Social History:   Social History     Tobacco Use    Smoking status: Never    Smokeless tobacco: Never   Substance Use Topics    Alcohol use: No     Family history: family history includes Cancer in her brother, mother, and another family member. ALLERGIES:  Patient has no known allergies. Prior to Admission medications    Medication Sig Start Date End Date Taking?  Authorizing Provider   flecainide (TAMBOCOR) 50 MG tablet Take 1 tablet by mouth 2 times daily 12/27/22  Yes Darnell Mcarthur MD   metoprolol tartrate (LOPRESSOR) 25 MG tablet TAKE 1/2 TABLET BY MOUTH TWICE A DAY 12/12/22  Yes Darnell Mcarthur MD   warfarin (COUMADIN) 4 MG tablet 2 mg (1/2 pill) on even days, 4 mg (1 pill) on odd days  Patient taking differently: 3 mg on even days, 4 mg (1 pill) on odd days 7/6/21  Yes Tootie Grossman DO   levothyroxine (SYNTHROID) 75 MCG tablet Take 75 mcg by mouth Daily   Yes Zuleika Rucker MD   furosemide (LASIX) 40 MG tablet Take 1 tablet by mouth daily as needed (leg swelling or weight gain more than 2 pounds overnight)  Patient not taking: No sig reported 7/6/21   Tootie Grossman DO   potassium chloride (KLOR-CON M) 20 MEQ extended release tablet Take 1 tablet by mouth daily as needed (if using lasix or if diarrhea)  Patient not taking: No sig reported 7/6/21   Tootie Grossman DO     Vitals:    01/03/23 1107   BP: 128/76   Pulse: 67   Resp: 16   Weight: 149 lb (67.6 kg)   Height: 5' 3\" (1.6 m)      Body mass index is 26.39 kg/m². Wt Readings from Last 3 Encounters:   01/03/23 149 lb (67.6 kg)   05/09/22 141 lb (64 kg)   03/23/22 145 lb (65.8 kg)     Constitutional:  Patient is elderly female in no apparent distress was sitting in a wheelchair. She gained 8 pounds since last visit on 5/9/2022. HEENT: Pupils are equal no conjunctival pallor noted. Cardiovascular: Auscultation: Normal S1 and S2.  2/6 systolic murmur noted in aortic area left sternal border. Peripheral pulses: Pedal pulses are 1+. Respiratory:  Respiratory effort is normal. Breath sounds are clear to auscultation. Extremities:  No edema, clubbing,  Cyanosis, petechiae. Neurologic:  Oriented to time, place, and person and non-anxious. No focal neurological deficit noted. Psychiatric: Normal mood and effect. EKG today is consistent with the normal sinus rhythm 67 bpm with poor R wave progression in anterior leads and left axis deviation. QTc is 446 ms. Echocardiogram on 5/18/2022 reported   Left ventricular systolic function is normal with an ejection fraction of   55-60%. The left atrium is mildly dilated. Mild aortic stenosis with mean gradient of 13 mmHg. Mitral annular calcification is present. Mild Mitral stenosis is present with a mean pressure of 4 mmHg. Mild to moderate mitral regurgitation present. Right ventricular systolic pressure of 38 mmHg consistent with mild   pulmonary hypertension. No evidence of pericardial effusion. Since prior Echo 10/15/2020, no significant changes. LAB REVIEW:  CBC:   Lab Results   Component Value Date/Time    WBC 7.8 03/23/2022 10:39 AM    HGB 14.2 03/23/2022 10:39 AM    HCT 43.3 03/23/2022 10:39 AM     03/23/2022 10:39 AM     Lipids:   Lab Results   Component Value Date    CHOL 120 08/05/2013    TRIG 167 (H) 08/05/2013    HDL 44 08/05/2013    LDLCALC 43 08/05/2013     Renal:   Lab Results   Component Value Date/Time    BUN 18 03/23/2022 11:17 AM    CREATININE 0.9 03/23/2022 11:17 AM     03/23/2022 11:17 AM    K 4.5 03/23/2022 11:17 AM     PT/INR:   Component 12/18/22 12/12/22 12/04/22 11/27/22 11/20/22 11/13/22   INR, MANUAL ENTER 3.6 3.1 2.9 3.1 3.5 2.1       IMPRESSION and RECOMMENDATIONS:      1. Paroxysmal atrial fibrillation (HCC)  Assessment & Plan:  Continue flecainide and metoprolol. Patient is anticoagulated with warfarin. INR has been therapeutic and is monitored weekly by her daughter in coordination with PCP regularly. Apparently weekly monitoring is mandated by the supplier of the home monitoring system. Orders:  -     EKG 12 lead  2. Encounter for monitoring flecainide therapy  Assessment & Plan: Tolerating it well. We will repeat EKG to assess QT C in 6 months and follow-up. 3. Aortic stenosis, mild  Assessment & Plan:  Recent echo from May of this year discussed with patient. Patient is asymptomatic and will continue to monitor clinically. Continue current cardiovascular medications which have been reviewed and discussed individually with you. Appropriate prescriptions if needed on this visit are addressed. After visit summery is provided. Questions answered and patient verbalizes understanding. Follow up in 6 months with ECG,  sooner if needed. Dai Styles MD, 1/3/2023 11:57 AM     Please note this report has been partially produced using speech recognition software and may contain errors related to that system including errors in grammar, punctuation, and spelling, as well as words and phrases that may be inappropriate. If there are any questions or concerns please feel free to contact the dictating provider for clarification.

## 2023-01-03 NOTE — PROGRESS NOTES
[id number] Score for Atrial Fibrillation Stroke Risk   Risk   Factors  Component Value   C CHF No 0   H HTN Yes 1   A2 Age >= 76 Yes,  (82 y.o.) 2   D DM No 0   S2 Prior Stroke/TIA No 0   V Vascular Disease No 0   A Age 74-69 No,  (82 y.o.) 0   Sc Sex female 1    [id number]  Score  4   Score last updated 1/3/23 76:91 AM EST    Click here for a link to the UpToDate guideline \"Atrial Fibrillation: Anticoagulation therapy to prevent embolization    Disclaimer: Risk Score calculation is dependent on accuracy of patient problem list and past encounter diagnosis.

## 2023-01-03 NOTE — PATIENT INSTRUCTIONS
Continue current cardiovascular medications which have been reviewed and discussed individually with you. Appropriate prescriptions if needed on this visit are addressed. After visit summery is provided. Questions answered and patient verbalizes understanding. Follow up in 6 months with ECG,  sooner if needed.

## 2023-01-03 NOTE — ASSESSMENT & PLAN NOTE
Continue flecainide and metoprolol. Patient is anticoagulated with warfarin. INR has been therapeutic and is monitored weekly by her daughter in coordination with PCP regularly. Apparently weekly monitoring is mandated by the supplier of the home monitoring system.

## 2023-01-03 NOTE — ASSESSMENT & PLAN NOTE
Recent echo from May of this year discussed with patient. Patient is asymptomatic and will continue to monitor clinically.

## 2023-02-25 ENCOUNTER — HOSPITAL ENCOUNTER (OUTPATIENT)
Age: 88
Discharge: HOME | End: 2023-02-25
Payer: MEDICARE

## 2023-02-25 ENCOUNTER — HOSPITAL ENCOUNTER (OUTPATIENT)
Dept: GENERAL RADIOLOGY | Age: 88
Discharge: HOME | End: 2023-02-25
Payer: MEDICARE

## 2023-02-25 DIAGNOSIS — R05.9 COUGH, UNSPECIFIED TYPE: ICD-10-CM

## 2023-02-25 LAB
ANION GAP SERPL CALCULATED.3IONS-SCNC: 12 MMOL/L (ref 4–16)
BASOPHILS ABSOLUTE: 0.1 K/CU MM
BASOPHILS RELATIVE PERCENT: 1.3 % (ref 0–1)
BUN SERPL-MCNC: 23 MG/DL (ref 6–23)
CALCIUM SERPL-MCNC: 9.2 MG/DL (ref 8.3–10.6)
CHLORIDE BLD-SCNC: 96 MMOL/L (ref 99–110)
CO2: 25 MMOL/L (ref 21–32)
CREAT SERPL-MCNC: 0.9 MG/DL (ref 0.6–1.1)
DIFFERENTIAL TYPE: ABNORMAL
EOSINOPHILS ABSOLUTE: 1.2 K/CU MM
EOSINOPHILS RELATIVE PERCENT: 14 % (ref 0–3)
GFR SERPL CREATININE-BSD FRML MDRD: >60 ML/MIN/1.73M2
GLUCOSE SERPL-MCNC: 114 MG/DL (ref 70–99)
HCT VFR BLD CALC: 42.9 % (ref 37–47)
HEMOGLOBIN: 13.8 GM/DL (ref 12.5–16)
IMMATURE NEUTROPHIL %: 0.2 % (ref 0–0.43)
LYMPHOCYTES ABSOLUTE: 1.7 K/CU MM
LYMPHOCYTES RELATIVE PERCENT: 20.1 % (ref 24–44)
MCH RBC QN AUTO: 29.7 PG (ref 27–31)
MCHC RBC AUTO-ENTMCNC: 32.2 % (ref 32–36)
MCV RBC AUTO: 92.3 FL (ref 78–100)
MONOCYTES ABSOLUTE: 0.6 K/CU MM
MONOCYTES RELATIVE PERCENT: 6.7 % (ref 0–4)
PDW BLD-RTO: 15.5 % (ref 11.7–14.9)
PLATELET # BLD: 281 K/CU MM (ref 140–440)
PMV BLD AUTO: 8.8 FL (ref 7.5–11.1)
POTASSIUM SERPL-SCNC: 4.1 MMOL/L (ref 3.5–5.1)
RBC # BLD: 4.65 M/CU MM (ref 4.2–5.4)
SEGMENTED NEUTROPHILS ABSOLUTE COUNT: 5 K/CU MM
SEGMENTED NEUTROPHILS RELATIVE PERCENT: 57.7 % (ref 36–66)
SODIUM BLD-SCNC: 133 MMOL/L (ref 135–145)
TOTAL IMMATURE NEUTOROPHIL: 0.02 K/CU MM
TSH SERPL DL<=0.005 MIU/L-ACNC: 0.99 UIU/ML (ref 0.27–4.2)
WBC # BLD: 8.7 K/CU MM (ref 4–10.5)

## 2023-02-25 PROCEDURE — 36415 COLL VENOUS BLD VENIPUNCTURE: CPT

## 2023-02-25 PROCEDURE — 71046 X-RAY EXAM CHEST 2 VIEWS: CPT

## 2023-02-25 PROCEDURE — 84443 ASSAY THYROID STIM HORMONE: CPT

## 2023-02-25 PROCEDURE — 80048 BASIC METABOLIC PNL TOTAL CA: CPT

## 2023-02-25 PROCEDURE — 85025 COMPLETE CBC W/AUTO DIFF WBC: CPT

## 2023-06-21 ENCOUNTER — HOSPITAL ENCOUNTER (OUTPATIENT)
Age: 88
Setting detail: OBSERVATION
Discharge: HOME | End: 2023-06-22
Attending: STUDENT IN AN ORGANIZED HEALTH CARE EDUCATION/TRAINING PROGRAM | Admitting: INTERNAL MEDICINE
Payer: MEDICARE

## 2023-06-21 ENCOUNTER — APPOINTMENT (OUTPATIENT)
Dept: CT IMAGING | Age: 88
End: 2023-06-21
Payer: MEDICARE

## 2023-06-21 ENCOUNTER — APPOINTMENT (OUTPATIENT)
Dept: GENERAL RADIOLOGY | Age: 88
End: 2023-06-21
Payer: MEDICARE

## 2023-06-21 DIAGNOSIS — R55 SYNCOPE AND COLLAPSE: Primary | ICD-10-CM

## 2023-06-21 LAB
ALBUMIN SERPL-MCNC: 3.9 GM/DL (ref 3.4–5)
ALP BLD-CCNC: 59 IU/L (ref 40–129)
ALT SERPL-CCNC: 9 U/L (ref 10–40)
ANION GAP SERPL CALCULATED.3IONS-SCNC: 11 MMOL/L (ref 4–16)
APTT: 70.8 SECONDS (ref 25.1–37.1)
AST SERPL-CCNC: 17 IU/L (ref 15–37)
BASOPHILS ABSOLUTE: 0.1 K/CU MM
BASOPHILS RELATIVE PERCENT: 0.7 % (ref 0–1)
BILIRUB SERPL-MCNC: 0.6 MG/DL (ref 0–1)
BUN SERPL-MCNC: 17 MG/DL (ref 6–23)
CALCIUM SERPL-MCNC: 9 MG/DL (ref 8.3–10.6)
CHLORIDE BLD-SCNC: 96 MMOL/L (ref 99–110)
CO2: 24 MMOL/L (ref 21–32)
CREAT SERPL-MCNC: 0.9 MG/DL (ref 0.6–1.1)
DIFFERENTIAL TYPE: ABNORMAL
EKG ATRIAL RATE: 56 BPM
EKG DIAGNOSIS: NORMAL
EKG P AXIS: 50 DEGREES
EKG P-R INTERVAL: 222 MS
EKG Q-T INTERVAL: 506 MS
EKG QRS DURATION: 110 MS
EKG QTC CALCULATION (BAZETT): 488 MS
EKG R AXIS: -31 DEGREES
EKG T AXIS: 31 DEGREES
EKG VENTRICULAR RATE: 56 BPM
EOSINOPHILS ABSOLUTE: 1.1 K/CU MM
EOSINOPHILS RELATIVE PERCENT: 13.1 % (ref 0–3)
GFR SERPL CREATININE-BSD FRML MDRD: >60 ML/MIN/1.73M2
GLUCOSE SERPL-MCNC: 110 MG/DL (ref 70–99)
HCT VFR BLD CALC: 42.9 % (ref 37–47)
HEMOGLOBIN: 14 GM/DL (ref 12.5–16)
IMMATURE NEUTROPHIL %: 0.2 % (ref 0–0.43)
INR BLD: 2.8 INDEX
LYMPHOCYTES ABSOLUTE: 1.8 K/CU MM
LYMPHOCYTES RELATIVE PERCENT: 21.4 % (ref 24–44)
MAGNESIUM: 1.9 MG/DL (ref 1.8–2.4)
MCH RBC QN AUTO: 30.5 PG (ref 27–31)
MCHC RBC AUTO-ENTMCNC: 32.6 % (ref 32–36)
MCV RBC AUTO: 93.5 FL (ref 78–100)
MONOCYTES ABSOLUTE: 0.8 K/CU MM
MONOCYTES RELATIVE PERCENT: 8.8 % (ref 0–4)
PDW BLD-RTO: 13.8 % (ref 11.7–14.9)
PLATELET # BLD: 216 K/CU MM (ref 140–440)
PMV BLD AUTO: 10 FL (ref 7.5–11.1)
POTASSIUM SERPL-SCNC: 4 MMOL/L (ref 3.5–5.1)
PROTHROMBIN TIME: 29.6 SECONDS (ref 11.7–14.5)
RBC # BLD: 4.59 M/CU MM (ref 4.2–5.4)
SEGMENTED NEUTROPHILS ABSOLUTE COUNT: 4.8 K/CU MM
SEGMENTED NEUTROPHILS RELATIVE PERCENT: 55.8 % (ref 36–66)
SODIUM BLD-SCNC: 131 MMOL/L (ref 135–145)
TOTAL IMMATURE NEUTOROPHIL: 0.02 K/CU MM
TOTAL PROTEIN: 7 GM/DL (ref 6.4–8.2)
TROPONIN T: <0.01 NG/ML
TSH SERPL DL<=0.005 MIU/L-ACNC: 2.6 UIU/ML (ref 0.27–4.2)
WBC # BLD: 8.6 K/CU MM (ref 4–10.5)

## 2023-06-21 PROCEDURE — 6370000000 HC RX 637 (ALT 250 FOR IP): Performed by: NURSE PRACTITIONER

## 2023-06-21 PROCEDURE — 36415 COLL VENOUS BLD VENIPUNCTURE: CPT

## 2023-06-21 PROCEDURE — G0378 HOSPITAL OBSERVATION PER HR: HCPCS

## 2023-06-21 PROCEDURE — 80053 COMPREHEN METABOLIC PANEL: CPT

## 2023-06-21 PROCEDURE — 81001 URINALYSIS AUTO W/SCOPE: CPT

## 2023-06-21 PROCEDURE — 85730 THROMBOPLASTIN TIME PARTIAL: CPT

## 2023-06-21 PROCEDURE — 84443 ASSAY THYROID STIM HORMONE: CPT

## 2023-06-21 PROCEDURE — 99285 EMERGENCY DEPT VISIT HI MDM: CPT

## 2023-06-21 PROCEDURE — 2580000003 HC RX 258: Performed by: NURSE PRACTITIONER

## 2023-06-21 PROCEDURE — 85025 COMPLETE CBC W/AUTO DIFF WBC: CPT

## 2023-06-21 PROCEDURE — 84484 ASSAY OF TROPONIN QUANT: CPT

## 2023-06-21 PROCEDURE — 83735 ASSAY OF MAGNESIUM: CPT

## 2023-06-21 PROCEDURE — 85610 PROTHROMBIN TIME: CPT

## 2023-06-21 PROCEDURE — 71045 X-RAY EXAM CHEST 1 VIEW: CPT

## 2023-06-21 PROCEDURE — 93005 ELECTROCARDIOGRAM TRACING: CPT | Performed by: STUDENT IN AN ORGANIZED HEALTH CARE EDUCATION/TRAINING PROGRAM

## 2023-06-21 PROCEDURE — 70450 CT HEAD/BRAIN W/O DYE: CPT

## 2023-06-21 PROCEDURE — 93010 ELECTROCARDIOGRAM REPORT: CPT | Performed by: INTERNAL MEDICINE

## 2023-06-21 RX ORDER — SODIUM CHLORIDE 9 MG/ML
INJECTION, SOLUTION INTRAVENOUS CONTINUOUS
Status: DISCONTINUED | OUTPATIENT
Start: 2023-06-21 | End: 2023-06-21

## 2023-06-21 RX ORDER — LEVOTHYROXINE SODIUM 0.07 MG/1
75 TABLET ORAL DAILY
Status: DISCONTINUED | OUTPATIENT
Start: 2023-06-22 | End: 2023-06-22 | Stop reason: HOSPADM

## 2023-06-21 RX ORDER — ONDANSETRON 4 MG/1
4 TABLET, ORALLY DISINTEGRATING ORAL EVERY 8 HOURS PRN
Status: DISCONTINUED | OUTPATIENT
Start: 2023-06-21 | End: 2023-06-22 | Stop reason: HOSPADM

## 2023-06-21 RX ORDER — SODIUM CHLORIDE 9 MG/ML
INJECTION, SOLUTION INTRAVENOUS PRN
Status: DISCONTINUED | OUTPATIENT
Start: 2023-06-21 | End: 2023-06-22 | Stop reason: HOSPADM

## 2023-06-21 RX ORDER — ACETAMINOPHEN 650 MG/1
650 SUPPOSITORY RECTAL EVERY 6 HOURS PRN
Status: DISCONTINUED | OUTPATIENT
Start: 2023-06-21 | End: 2023-06-22 | Stop reason: HOSPADM

## 2023-06-21 RX ORDER — WARFARIN SODIUM 5 MG/1
5 TABLET ORAL EVERY EVENING
COMMUNITY

## 2023-06-21 RX ORDER — ACETAMINOPHEN 325 MG/1
650 TABLET ORAL EVERY 6 HOURS PRN
Status: DISCONTINUED | OUTPATIENT
Start: 2023-06-21 | End: 2023-06-22 | Stop reason: HOSPADM

## 2023-06-21 RX ORDER — SODIUM CHLORIDE 0.9 % (FLUSH) 0.9 %
5-40 SYRINGE (ML) INJECTION PRN
Status: DISCONTINUED | OUTPATIENT
Start: 2023-06-21 | End: 2023-06-22 | Stop reason: HOSPADM

## 2023-06-21 RX ORDER — FLECAINIDE ACETATE 50 MG/1
50 TABLET ORAL 2 TIMES DAILY
Status: DISCONTINUED | OUTPATIENT
Start: 2023-06-21 | End: 2023-06-22 | Stop reason: HOSPADM

## 2023-06-21 RX ORDER — SODIUM CHLORIDE 0.9 % (FLUSH) 0.9 %
5-40 SYRINGE (ML) INJECTION EVERY 12 HOURS SCHEDULED
Status: DISCONTINUED | OUTPATIENT
Start: 2023-06-21 | End: 2023-06-22 | Stop reason: HOSPADM

## 2023-06-21 RX ORDER — ONDANSETRON 2 MG/ML
4 INJECTION INTRAMUSCULAR; INTRAVENOUS EVERY 6 HOURS PRN
Status: DISCONTINUED | OUTPATIENT
Start: 2023-06-21 | End: 2023-06-21 | Stop reason: ALTCHOICE

## 2023-06-21 RX ORDER — WARFARIN SODIUM 5 MG/1
5 TABLET ORAL DAILY
Status: DISCONTINUED | OUTPATIENT
Start: 2023-06-21 | End: 2023-06-22 | Stop reason: HOSPADM

## 2023-06-21 RX ORDER — POLYETHYLENE GLYCOL 3350 17 G/17G
17 POWDER, FOR SOLUTION ORAL DAILY PRN
Status: DISCONTINUED | OUTPATIENT
Start: 2023-06-21 | End: 2023-06-22 | Stop reason: HOSPADM

## 2023-06-21 RX ADMIN — FLECAINIDE ACETATE 50 MG: 50 TABLET ORAL at 20:01

## 2023-06-21 RX ADMIN — METOPROLOL TARTRATE 12.5 MG: 25 TABLET, FILM COATED ORAL at 20:03

## 2023-06-21 RX ADMIN — SODIUM CHLORIDE, PRESERVATIVE FREE 10 ML: 5 INJECTION INTRAVENOUS at 20:04

## 2023-06-21 RX ADMIN — ACETAMINOPHEN 650 MG: 325 TABLET ORAL at 20:00

## 2023-06-21 RX ADMIN — WARFARIN SODIUM 5 MG: 5 TABLET ORAL at 20:03

## 2023-06-21 ASSESSMENT — PAIN - FUNCTIONAL ASSESSMENT
PAIN_FUNCTIONAL_ASSESSMENT: NONE - DENIES PAIN
PAIN_FUNCTIONAL_ASSESSMENT: ACTIVITIES ARE NOT PREVENTED

## 2023-06-21 ASSESSMENT — PAIN SCALES - GENERAL
PAINLEVEL_OUTOF10: 0
PAINLEVEL_OUTOF10: 0
PAINLEVEL_OUTOF10: 4

## 2023-06-21 ASSESSMENT — PAIN DESCRIPTION - LOCATION: LOCATION: HEAD

## 2023-06-21 ASSESSMENT — LIFESTYLE VARIABLES
HOW OFTEN DO YOU HAVE A DRINK CONTAINING ALCOHOL: NEVER
HOW MANY STANDARD DRINKS CONTAINING ALCOHOL DO YOU HAVE ON A TYPICAL DAY: PATIENT DOES NOT DRINK

## 2023-06-21 ASSESSMENT — PAIN DESCRIPTION - ORIENTATION: ORIENTATION: RIGHT;LEFT

## 2023-06-21 ASSESSMENT — PAIN DESCRIPTION - PAIN TYPE: TYPE: ACUTE PAIN

## 2023-06-21 ASSESSMENT — PAIN DESCRIPTION - ONSET: ONSET: GRADUAL

## 2023-06-21 ASSESSMENT — PAIN DESCRIPTION - FREQUENCY: FREQUENCY: INTERMITTENT

## 2023-06-21 ASSESSMENT — PAIN DESCRIPTION - DESCRIPTORS: DESCRIPTORS: ACHING

## 2023-06-21 NOTE — ED PROVIDER NOTES
blood thinners, presenting to the ED, with a history of loss of consciousness. But daughter is by the bedside and assisting with the history. Patient admitted to feeling dizzy this morning while she was in the bathroom and said that she blacked out. Daughter by the bedside states she held the patient and avoid a fall that would have led to head injury. Patient endorses a history of nausea but no vomiting    No history of head injury, headache, focal neurologic deficits, chest pain, shortness of breath, abdominal pain, back pain, fever, visual changes, vomiting, or dysuria. Vital signs unremarkable. Physical examination significant for systolic murmurs, otherwise unremarkable. Blood drawn for labs and patient also scheduled for chest x-ray and EKG. EKG significant for sinus bradycardia with first-degree AV block, LAD, incomplete left bundle branch block, LVH but no prolonged QT or STEMI. Patient's laboratory evaluation is unremarkable for any acute abnormality  Patient chest x-ray is also unremarkable    Patient remained stable in the ED and is scheduled for admission for observation. Discussed with hospitalist who accepted the admission. Clinical Impression:  1. Syncope and collapse      Disposition referral (if applicable):  No follow-up provider specified. Disposition medications (if applicable):  New Prescriptions    No medications on file     ED Provider Disposition Time  DISPOSITION        Comment: Please note this report has been produced using speech recognition software and may contain errors related to that system including errors in grammar, punctuation, and spelling, as well as words and phrases that may be inappropriate. Efforts were made to edit the dictations.         [facility],[address], DO  06/25/23 9803

## 2023-06-21 NOTE — ED TRIAGE NOTES
Arrived per [facility] to room 7-1 for triage. Tolerated without difficulty. Bed in lowest position. Call light given. Gowned for exam. Monitor applied. EKG obtained.

## 2023-06-21 NOTE — PROGRESS NOTES
4 Eyes Skin Assessment     NAME:  Tyler Joseph  YOB: 1935  MEDICAL RECORD NUMBER:  [id number]    The patient is being assessed for  Admission    I agree that at least one RN has performed a thorough Head to Toe Skin Assessment on the patient. ALL assessment sites listed below have been assessed. Areas assessed by both nurses:    Head, Face, Ears, Shoulders, Back, Chest, Arms, Elbows, Hands, Sacrum. Buttock, Coccyx, Ischium, and Legs. Feet and Heels        Does the Patient have a Wound?  No noted wound(s)    Redness under L breast, large callous on bottom of R foot       Grady Prevention initiated by RN: No  Wound Care Orders initiated by RN: No    Pressure Injury (Stage 3,4, Unstageable, DTI, NWPT, and Complex wounds) if present, place Wound referral order by RN under : No    New Ostomies, if present place, Ostomy referral order under : No     Nurse 1 eSignature: Electronically signed by Nancy Julio RN on 6/21/23 at 4:58 PM EDT    **SHARE this note so that the co-signing nurse can place an eSignature**    Nurse 2 eSignature: Electronically signed by Graciela Slade RN on 6/21/23 at 5:12 PM EDT

## 2023-06-21 NOTE — ED NOTES
Dr Uziel Lyn in to discuss test results and plan to get admitted.      Iman Styles RN  06/21/23 [id number]

## 2023-06-21 NOTE — PROGRESS NOTES
PHARMACY ANTICOAGULATION MONITORING SERVICE    Jaylyn Sullivan is a 82 y.o. female on warfarin therapy for A-fib. Pharmacy consulted by MAHENDRA. Mike Escalante for monitoring and adjustment of treatment. Indication for anticoagulation: A-Fib  INR goal: 2-3  Warfarin dose prior to admission: 5mg daily    Pertinent Laboratory Values   Recent Labs     06/21/23  1145   INR 2.8   HGB 14.0   HCT 42.9          Assessment/Plan:  Drug Interactions: levothyroxine - may increase INR or anticoag effect of the warfarin  INR 2.8, therapeutic   Will continue warfarin 5mg this evening (confirmed with nurse patient had not taken dose today)  Pharmacy will continue to monitor and adjust warfarin therapy as indicated    Thank you for the consult.   Isabel James, [facility]  6/21/2023 7:12 PM

## 2023-06-21 NOTE — H&P
V2.0  History and Physical      Name:  Helen Roman /Age/Sex: 1935  (82 y.o. female)   MRN & CSN:  [id number] & [id number] Encounter Date/Time: 2023 4:04 PM EDT   Location:   PCP: Aide Tillman [address] Day: 1    Assessment and Plan:   Helen Roman is a 82 y.o. female with a pmh as listed below who presents with Syncope and collapse    Hospital Problems             Last Modified POA    * (Principal) Syncope and collapse 2023 Yes       Plan:  Syncope and collapse, patient and her daughter tell me she was getting ready to take shower, she was sitting on the commode attempting to have a bowel movement when she became dizzy and passed out. Most likely this is vasovagal syncope. Patient will be placed on telemetry monitor, cycle cardiac enzymes. Cardiology has been consulted, repeat echocardiogram has been ordered. Last echocardiogram was completed on 2022. At that time left ventricular systolic function was normal with an ejection fraction of 55 to 60%, left atrium is mildly dilated mild aortic stenosis mitral annular calcification, mild to moderate mitral regurgitation, right ventricular systolic pressure of 38 mmHg consistent with mild pulmonary hypertension, there was no evidence of pericardial effusion. Paroxysmal atrial fibrillation, patient is currently in sinus rhythm, will continue Coumadin pharmacy to dose, continue Tambocor, metoprolol. Hypertension, continue metoprolol  Hypothyroidism, continue Synthroid  Hyperlipidemia continue statin  Mild hyponatremia sodium is 131 we will monitor        Disposition:   Current Living situation: Home  Expected Disposition: Home  Estimated D/C: 24-48 hours    Diet ADULT DIET;  Regular; Low Fat/Low Chol/High Fiber/ITA   DVT Prophylaxis [] Lovenox, []  Heparin, [] SCDs, [] Ambulation,  [] Eliquis, [] Xarelto, [x] Coumadin   Code Status Full Code   Surrogate Decision Maker/ POA      Personally reviewed Lab Studies and

## 2023-06-21 NOTE — PROGRESS NOTES
Pt arrived to room 15 from ED at 1645. Pt in bed, with bed alarm on, call light in reach. Pt's daughter at bedside.

## 2023-06-22 VITALS
RESPIRATION RATE: 14 BRPM | SYSTOLIC BLOOD PRESSURE: 88 MMHG | OXYGEN SATURATION: 100 % | WEIGHT: 149 LBS | BODY MASS INDEX: 26.4 KG/M2 | HEART RATE: 61 BPM | HEIGHT: 63 IN | DIASTOLIC BLOOD PRESSURE: 59 MMHG | TEMPERATURE: 98.4 F

## 2023-06-22 LAB
ANION GAP SERPL CALCULATED.3IONS-SCNC: 6 MMOL/L (ref 4–16)
BACTERIA: ABNORMAL /HPF
BILIRUBIN URINE: NEGATIVE MG/DL
BLOOD, URINE: ABNORMAL
BUN SERPL-MCNC: 17 MG/DL (ref 6–23)
CALCIUM SERPL-MCNC: 8.7 MG/DL (ref 8.3–10.6)
CAST TYPE: ABNORMAL /HPF
CHLORIDE BLD-SCNC: 97 MMOL/L (ref 99–110)
CLARITY: ABNORMAL
CO2: 27 MMOL/L (ref 21–32)
COLOR: YELLOW
CREAT SERPL-MCNC: 0.9 MG/DL (ref 0.6–1.1)
CRYSTAL TYPE: NEGATIVE /HPF
EPITHELIAL CELLS, UA: 2 /HPF
GFR SERPL CREATININE-BSD FRML MDRD: >60 ML/MIN/1.73M2
GLUCOSE SERPL-MCNC: 93 MG/DL (ref 70–99)
GLUCOSE, URINE: NEGATIVE MG/DL
HCT VFR BLD CALC: 39.7 % (ref 37–47)
HEMOGLOBIN: 12.9 GM/DL (ref 12.5–16)
INR BLD: 2.9 INDEX
KETONES, URINE: NEGATIVE MG/DL
LEUKOCYTE ESTERASE, URINE: ABNORMAL
LV EF: 55 %
LVEF MODALITY: NORMAL
MAGNESIUM: 2.1 MG/DL (ref 1.8–2.4)
MCH RBC QN AUTO: 30.4 PG (ref 27–31)
MCHC RBC AUTO-ENTMCNC: 32.5 % (ref 32–36)
MCV RBC AUTO: 93.6 FL (ref 78–100)
NITRITE URINE, QUANTITATIVE: NEGATIVE
PDW BLD-RTO: 13.8 % (ref 11.7–14.9)
PH, URINE: 6.5 (ref 5–8)
PLATELET # BLD: 236 K/CU MM (ref 140–440)
PMV BLD AUTO: 9.1 FL (ref 7.5–11.1)
POTASSIUM SERPL-SCNC: 4.3 MMOL/L (ref 3.5–5.1)
PROTEIN UA: NEGATIVE MG/DL
PROTHROMBIN TIME: 30.3 SECONDS (ref 11.7–14.5)
RBC # BLD: 4.24 M/CU MM (ref 4.2–5.4)
RBC URINE: 1 /HPF (ref 0–6)
SODIUM BLD-SCNC: 130 MMOL/L (ref 135–145)
SPECIFIC GRAVITY UA: 1.01 (ref 1–1.03)
UROBILINOGEN, URINE: 0.2 MG/DL (ref 0.2–1)
WBC # BLD: 8.4 K/CU MM (ref 4–10.5)
WBC UA: 8 /HPF (ref 0–5)

## 2023-06-22 PROCEDURE — 80048 BASIC METABOLIC PNL TOTAL CA: CPT

## 2023-06-22 PROCEDURE — 36415 COLL VENOUS BLD VENIPUNCTURE: CPT

## 2023-06-22 PROCEDURE — 99223 1ST HOSP IP/OBS HIGH 75: CPT | Performed by: INTERNAL MEDICINE

## 2023-06-22 PROCEDURE — G0378 HOSPITAL OBSERVATION PER HR: HCPCS

## 2023-06-22 PROCEDURE — 85027 COMPLETE CBC AUTOMATED: CPT

## 2023-06-22 PROCEDURE — 93306 TTE W/DOPPLER COMPLETE: CPT

## 2023-06-22 PROCEDURE — 6370000000 HC RX 637 (ALT 250 FOR IP): Performed by: NURSE PRACTITIONER

## 2023-06-22 PROCEDURE — 85610 PROTHROMBIN TIME: CPT

## 2023-06-22 PROCEDURE — 94761 N-INVAS EAR/PLS OXIMETRY MLT: CPT

## 2023-06-22 PROCEDURE — 2580000003 HC RX 258: Performed by: NURSE PRACTITIONER

## 2023-06-22 PROCEDURE — 83735 ASSAY OF MAGNESIUM: CPT

## 2023-06-22 RX ADMIN — LEVOTHYROXINE SODIUM 75 MCG: 0.07 TABLET ORAL at 05:15

## 2023-06-22 RX ADMIN — METOPROLOL TARTRATE 12.5 MG: 25 TABLET, FILM COATED ORAL at 08:17

## 2023-06-22 RX ADMIN — FLECAINIDE ACETATE 50 MG: 50 TABLET ORAL at 08:17

## 2023-06-22 RX ADMIN — SODIUM CHLORIDE, PRESERVATIVE FREE 10 ML: 5 INJECTION INTRAVENOUS at 08:17

## 2023-06-22 ASSESSMENT — PAIN SCALES - GENERAL: PAINLEVEL_OUTOF10: 0

## 2023-06-22 NOTE — CARE COORDINATION
06/22/23 0645   Service Assessment   Patient Orientation Alert and Oriented   Cognition Alert   History Provided By Patient   Primary [place] is: Patient Declined (Legal Next of Kin Remains as Decision Maker)   PCP Verified by CM Yes   Prior Functional Level Assistance with the following:;Shopping; Bathing;Housework   Current Functional Level Assistance with the following:;Shopping; Bathing;Housework   Can patient return to prior living arrangement Yes   Ability to make needs known: Good   Family able to assist with home care needs: Yes   Would you like for me to discuss the discharge plan with any other family members/significant others, and if so, who? Yes  (Daughters)   Financial Resources Medicare;Medicaid   Community Resources None   Social/Functional History   Lives With Alone   Type of [address] One level   Home Equipment Walker, [address] Help From Family   ADL Assistance Independent   Homemaking Assistance Needs assistance   Homemaking Responsibilities Yes   Ambulation Assistance Independent   Transfer Assistance Independent   Active  No   Patient's  Info Daughters   Discharge Planning   Type of Residence Apartment   Living Arrangements Alone   Current Services Prior To Admission None   Potential Assistance Needed N/A   DME Ordered? No   Potential Assistance Purchasing Medications No   Type of Home Care Services None   Patient expects to be discharged to: Apartment   One/Two Story Residence One story   History of falls? 0   Services At/After Discharge   Transition of Care Consult (CM Consult) N/A   Services At/After Discharge None   [employer] Information Provided?  No   Mode of Transport at Discharge Self   Confirm Follow Up Transport Family   Condition of Participation: Discharge Planning   The Plan for Transition of Care is related to the following treatment goals: Patient plans to return

## 2023-06-22 NOTE — PLAN OF CARE
Problem: Discharge Planning  Goal: Discharge to home or other facility with appropriate resources  6/22/2023 1025 by Haylie Delgado RN  Outcome: Progressing  6/21/2023 2224 by Sharyle Snow, RN  Outcome: Progressing     Problem: Safety - Adult  Goal: Free from fall injury  6/22/2023 1025 by Haylie Delgado RN  Outcome: Progressing  6/21/2023 2224 by Sharyle Snow, RN  Outcome: Progressing     Problem: ABCDS Injury Assessment  Goal: Absence of physical injury  6/22/2023 1025 by Haylie Delgado RN  Outcome: Progressing  6/21/2023 2224 by Sharyle Snow, RN  Outcome: Progressing     Problem: Pain  Goal: Verbalizes/displays adequate comfort level or baseline comfort level  Outcome: Progressing

## 2023-06-22 NOTE — PLAN OF CARE
Problem: Discharge Planning  Goal: Discharge to home or other facility with appropriate resources  6/22/2023 1645 by Carol Ocampo RN  Outcome: Completed  6/22/2023 1025 by Carol Ocampo RN  Outcome: Progressing     Problem: Safety - Adult  Goal: Free from fall injury  6/22/2023 1645 by Carol Ocampo RN  Outcome: Completed  6/22/2023 1025 by Carol Ocampo RN  Outcome: Progressing     Problem: ABCDS Injury Assessment  Goal: Absence of physical injury  6/22/2023 1645 by Carol Ocampo RN  Outcome: Completed  6/22/2023 1025 by Carol Ocampo RN  Outcome: Progressing     Problem: Pain  Goal: Verbalizes/displays adequate comfort level or baseline comfort level  6/22/2023 1645 by Carol Ocampo RN  Outcome: Completed  6/22/2023 1025 by Carol Ocampo RN  Outcome: Progressing

## 2023-06-22 NOTE — PROGRESS NOTES
PHARMACY ANTICOAGULATION MONITORING SERVICE    Tyson Carpio is a 82 y.o. female on warfarin therapy for A-fib. Pharmacy consulted by JESUS De Dios for monitoring and adjustment of treatment. Indication for anticoagulation: A-Fib  INR goal: 2-3  Warfarin dose prior to admission: 5mg daily    Pertinent Laboratory Values   Recent Labs     06/21/23  1145 06/22/23  0650   INR 2.8  --    HGB 14.0 12.9   HCT 42.9 39.7    236         Assessment/Plan:  Drug Interactions: levothyroxine - may increase INR or anticoag effect of the warfarin  INR 2.8, therapeutic on 6/21/23  No INR was ordered for 6/22/23, I placed an order to start a daily INR today. 6/22/23 INR = 2.9, continue 5mg daily and evaluate on 6/23 if INR continues to increase. Will continue warfarin 5mg daily (confirmed with nurse patient had not taken dose today)  Pharmacy will continue to monitor and adjust warfarin therapy as indicated    Thank you for the consult.   [address] [place] [address]  6/22/2023 8:02 AM

## 2023-06-22 NOTE — PLAN OF CARE
Problem: Discharge Planning  Goal: Discharge to home or other facility with appropriate resources  6/21/2023 2224 by Alnaa Park RN  Outcome: Progressing  6/21/2023 2000 by Quique Chavez RN  Outcome: Progressing     Problem: Safety - Adult  Goal: Free from fall injury  6/21/2023 2224 by Alana Park RN  Outcome: Progressing  6/21/2023 2000 by Quique Chavez RN  Outcome: Progressing     Problem: ABCDS Injury Assessment  Goal: Absence of physical injury  6/21/2023 2224 by Alana Park RN  Outcome: Progressing  6/21/2023 2000 by Quique Chavez RN  Outcome: Progressing

## 2023-06-22 NOTE — PROGRESS NOTES
Called and spoke to [facility], patient's daughter, and let her know that her mother was being discharged today. Annmarie stated she had to go to a doctors appointment and then she would be here. The patient was updated that I was able to get ahold of her daughter and that she would be here around dinner time. Patient understands and is eager to go.

## 2023-06-22 NOTE — CONSULTS
CARDIOLOGY CONSULT NOTE   Reason for consultation:  sycnope    Referring physician:  Orlin Colorado MD     Primary care physician: Britni Barksdale MD      Dear  Dr. Orlin Colorado MD   Thanks for the consult. Chief Complaints :  Chief Complaint   Patient presents with    Loss of Consciousness     States was getting up to get a shower. States does not remember passing out. States is nauseated. Denies further complaint. History of present illness:Luna is a 82 y. o.year old who presents after passing out event as she was having a bowel movement and straining hard she passed out for few minutes daughter was able to help her and she did not have any injury she states that she has had a syncope leg this event few years ago she does have history of atrial fibrillation she is on chronic flecainide therapy last echo in May 2022 showed moderate mitral regurgitation and mild aortic stenosis. EKG shows a calculated QTc of 466 she is in sinus rhythm    Past medical history:    has a past medical history of Aortic stenosis, mild, Arthritis, Diverticulitis, Easy bruising, H/O cardiovascular stress test, H/O Doppler echocardiogram, H/O echocardiogram, Heart murmur, History of Holter monitoring, Hyperlipidemia, Hypertension, Leg cramps, PAF (paroxysmal atrial fibrillation) ([address] Syncope and collapse, and Thyroid disease. Past surgical history:   has a past surgical history that includes Cholecystectomy; Thyroid surgery; Abdomen surgery; Colonoscopy; joint replacement; joint replacement; Eye surgery (06/2018); Cataract removal with implant (Right, 06/14/2019); Intracapsular cataract extraction (Right, 6/14/2019); Cataract removal with implant (Left, 06/28/2019); and Intracapsular cataract extraction (Left, 6/28/2019). Social History:   reports that she has never smoked. She has never used smokeless tobacco. She reports that she does not drink alcohol and does not use drugs.   Family history:   no family history of

## 2023-06-22 NOTE — DISCHARGE SUMMARY
to auscultation  Gastrointestinal: Soft, non tender  Genitourinary: no suprapubic tenderness  Musculoskeletal: No edema  Skin: warm, dry  Neuro: Alert. Psych: Mood appropriate. Labs and Imaging   CT HEAD WO CONTRAST    Result Date: 6/21/2023  EXAMINATION: CT OF THE HEAD WITHOUT CONTRAST  6/21/2023 1:48 pm TECHNIQUE: CT of the head was performed without the administration of intravenous contrast. Automated exposure control, iterative reconstruction, and/or weight based adjustment of the mA/kV was utilized to reduce the radiation dose to as low as reasonably achievable. COMPARISON: None. HISTORY: ORDERING SYSTEM PROVIDED HISTORY: loc, TECHNOLOGIST PROVIDED HISTORY: Reason for exam:->loc, Has a \"code stroke\" or \"stroke alert\" been called? ->No Decision Support Exception - unselect if not a suspected or confirmed emergency medical condition->Emergency Medical Condition (MA) Reason for Exam: Loss of Consciousness Additional signs and symptoms: Loss of Consciousness Relevant Medical/Surgical History: Loss of Consciousness FINDINGS: BRAIN/VENTRICLES: There is moderate cerebral atrophy. There are white matter hypodensities in keeping with underlying microvascular disease. There is no acute intracranial hemorrhage, mass effect or midline shift. No abnormal extra-axial fluid collection. The gray-white differentiation is maintained without evidence of an acute infarct. There is no evidence of hydrocephalus. ORBITS: The visualized portion of the orbits demonstrate no acute abnormality. SINUSES: The visualized paranasal sinuses and mastoid air cells demonstrate no acute abnormality. SOFT TISSUES/SKULL:  No acute abnormality of the visualized skull or soft tissues. No acute intracranial abnormality. No change from prior examination. Moderate cerebral atrophy. White matter microvascular disease.      XR CHEST PORTABLE    Result Date: 6/21/2023  EXAMINATION: ONE XRAY VIEW OF THE CHEST 6/21/2023 12:07 pm

## 2023-06-22 NOTE — PROGRESS NOTES
Patient's other daughter, Pati Busch here to get her. Patient's IV and telemetry removed by [place] [facility] and patient's belongings packed up. Discharge instructions reviewed with the patient. Patient has a follow up appointment with her cardiologist on July 10th and she is aware of this. No changes in her medications and patient understands when she needs to take her next dose. Patient wheeled out to the front entrance with her belongings.

## 2023-06-23 DIAGNOSIS — R55 SYNCOPE AND COLLAPSE: Primary | ICD-10-CM

## 2023-06-23 DIAGNOSIS — I48.0 PAROXYSMAL ATRIAL FIBRILLATION (HCC): ICD-10-CM

## 2023-07-03 ENCOUNTER — HOSPITAL ENCOUNTER (EMERGENCY)
Age: 88
Discharge: HOME | End: 2023-07-03
Attending: EMERGENCY MEDICINE
Payer: MEDICARE

## 2023-07-03 ENCOUNTER — TELEPHONE (OUTPATIENT)
Dept: CARDIOLOGY CLINIC | Age: 88
End: 2023-07-03

## 2023-07-03 VITALS
WEIGHT: 134 LBS | SYSTOLIC BLOOD PRESSURE: 130 MMHG | HEART RATE: 52 BPM | OXYGEN SATURATION: 99 % | BODY MASS INDEX: 23.74 KG/M2 | DIASTOLIC BLOOD PRESSURE: 60 MMHG | RESPIRATION RATE: 15 BRPM | TEMPERATURE: 97.9 F | HEIGHT: 63 IN

## 2023-07-03 DIAGNOSIS — R55 VASOVAGAL EPISODE: Primary | ICD-10-CM

## 2023-07-03 LAB
CHP ED QC CHECK: NORMAL
EKG ATRIAL RATE: 54 BPM
EKG DIAGNOSIS: NORMAL
EKG P AXIS: 76 DEGREES
EKG P-R INTERVAL: 260 MS
EKG Q-T INTERVAL: 504 MS
EKG QRS DURATION: 110 MS
EKG QTC CALCULATION (BAZETT): 477 MS
EKG R AXIS: -34 DEGREES
EKG T AXIS: 19 DEGREES
EKG VENTRICULAR RATE: 54 BPM
GLUCOSE BLD-MCNC: 92 MG/DL
GLUCOSE BLD-MCNC: 92 MG/DL (ref 70–99)

## 2023-07-03 PROCEDURE — 99284 EMERGENCY DEPT VISIT MOD MDM: CPT

## 2023-07-03 PROCEDURE — 93005 ELECTROCARDIOGRAM TRACING: CPT | Performed by: EMERGENCY MEDICINE

## 2023-07-03 PROCEDURE — 82962 GLUCOSE BLOOD TEST: CPT

## 2023-07-03 PROCEDURE — 93010 ELECTROCARDIOGRAM REPORT: CPT | Performed by: INTERNAL MEDICINE

## 2023-07-03 ASSESSMENT — LIFESTYLE VARIABLES
HOW MANY STANDARD DRINKS CONTAINING ALCOHOL DO YOU HAVE ON A TYPICAL DAY: PATIENT DOES NOT DRINK
HOW OFTEN DO YOU HAVE A DRINK CONTAINING ALCOHOL: NEVER

## 2023-07-03 ASSESSMENT — ENCOUNTER SYMPTOMS
GASTROINTESTINAL NEGATIVE: 1
EYE PAIN: 0
EYE DISCHARGE: 0
PHOTOPHOBIA: 0
EYE REDNESS: 0
RESPIRATORY NEGATIVE: 1
EYE ITCHING: 0

## 2023-07-03 ASSESSMENT — PAIN - FUNCTIONAL ASSESSMENT: PAIN_FUNCTIONAL_ASSESSMENT: NONE - DENIES PAIN

## 2023-07-03 NOTE — ED NOTES
According to pt her vision to her Rt eye went black for a second and her daughter called the squad. When the daughter got here she said that this morning she was C/O dizziness and then when she got her to the  she C/O of it going black so she called the squad. Pt denies any vision problems, pain, or dizziness at this time.      Joanne Rondon RN  07/03/23 [id number]

## 2023-07-03 NOTE — ED NOTES
Discharge instructions reviewed with pt and pt's Daughter and verbalizes understanding.      Patricia Contreras RN  07/03/23 [id number]

## 2023-07-03 NOTE — ED PROVIDER NOTES
Triage Chief Complaint:   Eye Problem  [place]:    Sulaiman Strauss is a 82 y.o. female that presents to the ED where she had her daughter called on her  The patient tells me she lost vision in the right eye for 1 second she had no associated headache. Currently she is back to normal no problems with speech thought cognition denies any numbness or tingling INR back on June 22 was 2.9. She is being treated for A-fib denies any pain pressure tightness rapid irregular heart rate. She is unsure why she is in the ED she is awake alert and oriented in fact to person place and time. Patient really is recently hospitalized discharged on [phone] after she had a vasovagal episode. Cardiology was consulted no further work-up was necessary. 12:03 PM-discussion with the daughter the patient apparently is on the commode when her symptoms occurred is very similar to recent admit and discharge about 2 weeks ago. Patient is having no further neurological symptoms. She does have very poor eyesight and is seeing a ophthalmologist in the past there is no new findings I do not believe she had an episode of amaurosis fugax symptoms lasted only 1 second at best.  Patient was observed without any dysrhythmia at the bedside monitor Accu-Chek normal blood pressure minimally elevated systolic BP which can be controlled the patient informed the patient and daughter she will be observed in the ED and recheck. Patient does not want to stay      Past Medical History:   Diagnosis Date    Aortic stenosis, mild 12/01/2021    By echo October 2020    Arthritis     Diverticulitis     Easy bruising 05/10/2016    H/O cardiovascular stress test 04/23/2016    Normal stress test. EF 88%    H/O Doppler echocardiogram 05/18/2022    EF 55-60%. LA mildly dilated. Mild aortic stenosis with mean gradient of 13. Mitral annular calcification is present. Mild mitral stenosis is present with a mean pressure of 4. Mild to moderate mitral regurg.  RVSP 38 mild

## 2023-07-03 NOTE — TELEPHONE ENCOUNTER
Rec'vd a call from daughter to inform us that her mother has returned to the hospital today after having another vasovagal episode since prior admission on June 21st. Daughter states that they received the event monitor in the mail, but the monitor was broke and she was sending the monitor back to the company regardless because she does not know how to set up the monitor or attach it. I explained to the daughter that there is a toll free number that should have been included in the packaging of the monitor that she could call and a representative would be able to walk her through the steps of applying the monitor and activating it or she could bring the monitor in and we could apply the monitor and activate it for her mother. Daughter states that she will wait until her appointment scheduled for next month to discuss monitor with the provider.

## 2023-07-31 ENCOUNTER — NURSE ONLY (OUTPATIENT)
Dept: CARDIOLOGY CLINIC | Age: 88
End: 2023-07-31

## 2023-07-31 DIAGNOSIS — I48.0 PAROXYSMAL ATRIAL FIBRILLATION (HCC): Primary | ICD-10-CM

## 2023-08-04 ENCOUNTER — APPOINTMENT (OUTPATIENT)
Dept: CT IMAGING | Age: 88
End: 2023-08-04
Attending: EMERGENCY MEDICINE
Payer: MEDICARE

## 2023-08-04 ENCOUNTER — HOSPITAL ENCOUNTER (INPATIENT)
Age: 88
LOS: 6 days | Discharge: NURSING, REHAB OR LONG-TERM CARE | DRG: 280 | End: 2023-08-10
Attending: STUDENT IN AN ORGANIZED HEALTH CARE EDUCATION/TRAINING PROGRAM | Admitting: STUDENT IN AN ORGANIZED HEALTH CARE EDUCATION/TRAINING PROGRAM
Payer: MEDICARE

## 2023-08-04 ENCOUNTER — APPOINTMENT (OUTPATIENT)
Dept: GENERAL RADIOLOGY | Age: 88
DRG: 280 | End: 2023-08-04
Attending: STUDENT IN AN ORGANIZED HEALTH CARE EDUCATION/TRAINING PROGRAM
Payer: MEDICARE

## 2023-08-04 ENCOUNTER — APPOINTMENT (OUTPATIENT)
Dept: GENERAL RADIOLOGY | Age: 88
End: 2023-08-04
Payer: MEDICARE

## 2023-08-04 ENCOUNTER — APPOINTMENT (OUTPATIENT)
Dept: CT IMAGING | Age: 88
End: 2023-08-04
Payer: MEDICARE

## 2023-08-04 ENCOUNTER — HOSPITAL ENCOUNTER (EMERGENCY)
Age: 88
Discharge: ANOTHER HOSPITAL | End: 2023-08-04
Attending: EMERGENCY MEDICINE
Payer: MEDICARE

## 2023-08-04 VITALS
SYSTOLIC BLOOD PRESSURE: 102 MMHG | BODY MASS INDEX: 26.8 KG/M2 | OXYGEN SATURATION: 98 % | HEIGHT: 64 IN | RESPIRATION RATE: 20 BRPM | TEMPERATURE: 98.7 F | WEIGHT: 157 LBS | HEART RATE: 94 BPM | DIASTOLIC BLOOD PRESSURE: 62 MMHG

## 2023-08-04 DIAGNOSIS — R79.1 SUBTHERAPEUTIC INTERNATIONAL NORMALIZED RATIO (INR): ICD-10-CM

## 2023-08-04 DIAGNOSIS — I50.9 ACUTE CONGESTIVE HEART FAILURE, UNSPECIFIED HEART FAILURE TYPE (HCC): Primary | ICD-10-CM

## 2023-08-04 DIAGNOSIS — E87.1 HYPONATREMIA: ICD-10-CM

## 2023-08-04 DIAGNOSIS — I21.4 NSTEMI (NON-ST ELEVATED MYOCARDIAL INFARCTION) (HCC): ICD-10-CM

## 2023-08-04 LAB
ALBUMIN SERPL-MCNC: 4 GM/DL (ref 3.4–5)
ALP BLD-CCNC: 53 IU/L (ref 40–129)
ALT SERPL-CCNC: 14 U/L (ref 10–40)
ANION GAP SERPL CALCULATED.3IONS-SCNC: 12 MMOL/L (ref 4–16)
ANTI-XA UNFRAC HEPARIN: <0.1 IU/ML (ref 0.3–0.7)
APTT: 55.3 SECONDS (ref 25.1–37.1)
AST SERPL-CCNC: 40 IU/L (ref 15–37)
BACTERIA: NEGATIVE /HPF
BASOPHILS ABSOLUTE: 0 K/CU MM
BASOPHILS RELATIVE PERCENT: 0.3 % (ref 0–1)
BILIRUB SERPL-MCNC: 1.8 MG/DL (ref 0–1)
BILIRUBIN URINE: NEGATIVE MG/DL
BLOOD, URINE: ABNORMAL
BUN SERPL-MCNC: 22 MG/DL (ref 6–23)
CALCIUM SERPL-MCNC: 9.4 MG/DL (ref 8.3–10.6)
CAST TYPE: ABNORMAL /HPF
CHLORIDE BLD-SCNC: 94 MMOL/L (ref 99–110)
CLARITY: CLEAR
CO2: 23 MMOL/L (ref 21–32)
COLOR: YELLOW
CREAT SERPL-MCNC: 0.8 MG/DL (ref 0.6–1.1)
CRYSTAL TYPE: NEGATIVE /HPF
DIFFERENTIAL TYPE: ABNORMAL
EOSINOPHILS ABSOLUTE: 0 K/CU MM
EOSINOPHILS RELATIVE PERCENT: 0.3 % (ref 0–3)
EPITHELIAL CELLS, UA: 15 /HPF
GFR SERPL CREATININE-BSD FRML MDRD: >60 ML/MIN/1.73M2
GLUCOSE BLD-MCNC: 116 MG/DL (ref 70–99)
GLUCOSE SERPL-MCNC: 116 MG/DL (ref 70–99)
GLUCOSE, URINE: NEGATIVE MG/DL
HCT VFR BLD CALC: 41.4 % (ref 37–47)
HEMOGLOBIN: 13.9 GM/DL (ref 12.5–16)
IMMATURE NEUTROPHIL %: 0.3 % (ref 0–0.43)
INR BLD: 1.6 INDEX
KETONES, URINE: ABNORMAL MG/DL
LEUKOCYTE ESTERASE, URINE: ABNORMAL
LIPASE: 23 IU/L (ref 13–60)
LYMPHOCYTES ABSOLUTE: 1.6 K/CU MM
LYMPHOCYTES RELATIVE PERCENT: 13.8 % (ref 24–44)
MAGNESIUM: 2 MG/DL (ref 1.8–2.4)
MCH RBC QN AUTO: 30.8 PG (ref 27–31)
MCHC RBC AUTO-ENTMCNC: 33.6 % (ref 32–36)
MCV RBC AUTO: 91.6 FL (ref 78–100)
MONOCYTES ABSOLUTE: 1.1 K/CU MM
MONOCYTES RELATIVE PERCENT: 9.4 % (ref 0–4)
NITRITE URINE, QUANTITATIVE: NEGATIVE
PDW BLD-RTO: 14.3 % (ref 11.7–14.9)
PH, URINE: 7 (ref 5–8)
PLATELET # BLD: 295 K/CU MM (ref 140–440)
PMV BLD AUTO: 9.3 FL (ref 7.5–11.1)
POTASSIUM SERPL-SCNC: 4.7 MMOL/L (ref 3.5–5.1)
PRO-BNP: ABNORMAL PG/ML
PROTEIN UA: 30 MG/DL
PROTHROMBIN TIME: 20 SECONDS (ref 11.7–14.5)
RBC # BLD: 4.52 M/CU MM (ref 4.2–5.4)
RBC URINE: 10 /HPF (ref 0–6)
SEGMENTED NEUTROPHILS ABSOLUTE COUNT: 8.8 K/CU MM
SEGMENTED NEUTROPHILS RELATIVE PERCENT: 75.9 % (ref 36–66)
SODIUM BLD-SCNC: 129 MMOL/L (ref 135–145)
SPECIFIC GRAVITY UA: 1.02 (ref 1–1.03)
TOTAL IMMATURE NEUTOROPHIL: 0.04 K/CU MM
TOTAL PROTEIN: 6.7 GM/DL (ref 6.4–8.2)
TROPONIN T: 0.55 NG/ML
TROPONIN T: 0.65 NG/ML
UROBILINOGEN, URINE: 1 MG/DL (ref 0.2–1)
WBC # BLD: 11.6 K/CU MM (ref 4–10.5)
WBC UA: 8 /HPF (ref 0–5)

## 2023-08-04 PROCEDURE — 6360000002 HC RX W HCPCS: Performed by: EMERGENCY MEDICINE

## 2023-08-04 PROCEDURE — 6370000000 HC RX 637 (ALT 250 FOR IP): Performed by: EMERGENCY MEDICINE

## 2023-08-04 PROCEDURE — 83690 ASSAY OF LIPASE: CPT

## 2023-08-04 PROCEDURE — 82962 GLUCOSE BLOOD TEST: CPT

## 2023-08-04 PROCEDURE — 81001 URINALYSIS AUTO W/SCOPE: CPT

## 2023-08-04 PROCEDURE — 93005 ELECTROCARDIOGRAM TRACING: CPT | Performed by: INTERNAL MEDICINE

## 2023-08-04 PROCEDURE — 87086 URINE CULTURE/COLONY COUNT: CPT

## 2023-08-04 PROCEDURE — 85730 THROMBOPLASTIN TIME PARTIAL: CPT

## 2023-08-04 PROCEDURE — 71045 X-RAY EXAM CHEST 1 VIEW: CPT

## 2023-08-04 PROCEDURE — 93005 ELECTROCARDIOGRAM TRACING: CPT | Performed by: EMERGENCY MEDICINE

## 2023-08-04 PROCEDURE — 2500000003 HC RX 250 WO HCPCS: Performed by: EMERGENCY MEDICINE

## 2023-08-04 PROCEDURE — 2580000003 HC RX 258: Performed by: STUDENT IN AN ORGANIZED HEALTH CARE EDUCATION/TRAINING PROGRAM

## 2023-08-04 PROCEDURE — 2580000003 HC RX 258: Performed by: EMERGENCY MEDICINE

## 2023-08-04 PROCEDURE — 85610 PROTHROMBIN TIME: CPT

## 2023-08-04 PROCEDURE — 71275 CT ANGIOGRAPHY CHEST: CPT

## 2023-08-04 PROCEDURE — 51702 INSERT TEMP BLADDER CATH: CPT

## 2023-08-04 PROCEDURE — 6360000004 HC RX CONTRAST MEDICATION: Performed by: EMERGENCY MEDICINE

## 2023-08-04 PROCEDURE — 2140000000 HC CCU INTERMEDIATE R&B

## 2023-08-04 PROCEDURE — 2500000003 HC RX 250 WO HCPCS: Performed by: STUDENT IN AN ORGANIZED HEALTH CARE EDUCATION/TRAINING PROGRAM

## 2023-08-04 PROCEDURE — 80053 COMPREHEN METABOLIC PANEL: CPT

## 2023-08-04 PROCEDURE — 83735 ASSAY OF MAGNESIUM: CPT

## 2023-08-04 PROCEDURE — 84484 ASSAY OF TROPONIN QUANT: CPT

## 2023-08-04 PROCEDURE — 85025 COMPLETE CBC W/AUTO DIFF WBC: CPT

## 2023-08-04 PROCEDURE — 83880 ASSAY OF NATRIURETIC PEPTIDE: CPT

## 2023-08-04 PROCEDURE — 96374 THER/PROPH/DIAG INJ IV PUSH: CPT

## 2023-08-04 PROCEDURE — 99285 EMERGENCY DEPT VISIT HI MDM: CPT

## 2023-08-04 PROCEDURE — 70450 CT HEAD/BRAIN W/O DYE: CPT

## 2023-08-04 RX ORDER — POTASSIUM CHLORIDE 20 MEQ/1
40 TABLET, EXTENDED RELEASE ORAL PRN
Status: DISCONTINUED | OUTPATIENT
Start: 2023-08-04 | End: 2023-08-10 | Stop reason: HOSPADM

## 2023-08-04 RX ORDER — POTASSIUM CHLORIDE 7.45 MG/ML
10 INJECTION INTRAVENOUS PRN
Status: DISCONTINUED | OUTPATIENT
Start: 2023-08-04 | End: 2023-08-10 | Stop reason: HOSPADM

## 2023-08-04 RX ORDER — ONDANSETRON 4 MG/1
4 TABLET, ORALLY DISINTEGRATING ORAL EVERY 8 HOURS PRN
Status: DISCONTINUED | OUTPATIENT
Start: 2023-08-04 | End: 2023-08-04

## 2023-08-04 RX ORDER — HEPARIN SODIUM 10000 [USP'U]/100ML
5-30 INJECTION, SOLUTION INTRAVENOUS CONTINUOUS
Status: DISCONTINUED | OUTPATIENT
Start: 2023-08-04 | End: 2023-08-05

## 2023-08-04 RX ORDER — SODIUM CHLORIDE 0.9 % (FLUSH) 0.9 %
5-40 SYRINGE (ML) INJECTION PRN
Status: DISCONTINUED | OUTPATIENT
Start: 2023-08-04 | End: 2023-08-10 | Stop reason: HOSPADM

## 2023-08-04 RX ORDER — ACETAMINOPHEN 650 MG/1
650 SUPPOSITORY RECTAL EVERY 6 HOURS PRN
Status: DISCONTINUED | OUTPATIENT
Start: 2023-08-04 | End: 2023-08-10 | Stop reason: HOSPADM

## 2023-08-04 RX ORDER — ACETAMINOPHEN 325 MG/1
650 TABLET ORAL EVERY 6 HOURS PRN
Status: DISCONTINUED | OUTPATIENT
Start: 2023-08-04 | End: 2023-08-10 | Stop reason: HOSPADM

## 2023-08-04 RX ORDER — ENOXAPARIN SODIUM 100 MG/ML
40 INJECTION SUBCUTANEOUS DAILY
Status: DISCONTINUED | OUTPATIENT
Start: 2023-08-05 | End: 2023-08-04

## 2023-08-04 RX ORDER — FUROSEMIDE 10 MG/ML
40 INJECTION INTRAMUSCULAR; INTRAVENOUS ONCE
Status: DISCONTINUED | OUTPATIENT
Start: 2023-08-04 | End: 2023-08-04 | Stop reason: HOSPADM

## 2023-08-04 RX ORDER — SODIUM CHLORIDE 9 MG/ML
INJECTION, SOLUTION INTRAVENOUS PRN
Status: DISCONTINUED | OUTPATIENT
Start: 2023-08-04 | End: 2023-08-10 | Stop reason: HOSPADM

## 2023-08-04 RX ORDER — MAGNESIUM SULFATE IN WATER 40 MG/ML
2000 INJECTION, SOLUTION INTRAVENOUS PRN
Status: DISCONTINUED | OUTPATIENT
Start: 2023-08-04 | End: 2023-08-10 | Stop reason: HOSPADM

## 2023-08-04 RX ORDER — HEPARIN SODIUM 10000 [USP'U]/100ML
5-30 INJECTION, SOLUTION INTRAVENOUS CONTINUOUS
Status: DISCONTINUED | OUTPATIENT
Start: 2023-08-04 | End: 2023-08-04 | Stop reason: HOSPADM

## 2023-08-04 RX ORDER — HEPARIN SODIUM 1000 [USP'U]/ML
2000 INJECTION, SOLUTION INTRAVENOUS; SUBCUTANEOUS PRN
Status: DISCONTINUED | OUTPATIENT
Start: 2023-08-04 | End: 2023-08-04 | Stop reason: HOSPADM

## 2023-08-04 RX ORDER — PROCHLORPERAZINE EDISYLATE 5 MG/ML
5 INJECTION INTRAMUSCULAR; INTRAVENOUS EVERY 6 HOURS PRN
Status: DISCONTINUED | OUTPATIENT
Start: 2023-08-04 | End: 2023-08-10 | Stop reason: HOSPADM

## 2023-08-04 RX ORDER — HEPARIN SODIUM 1000 [USP'U]/ML
4000 INJECTION, SOLUTION INTRAVENOUS; SUBCUTANEOUS PRN
Status: DISCONTINUED | OUTPATIENT
Start: 2023-08-04 | End: 2023-08-04 | Stop reason: HOSPADM

## 2023-08-04 RX ORDER — POLYETHYLENE GLYCOL 3350 17 G/17G
17 POWDER, FOR SOLUTION ORAL DAILY PRN
Status: DISCONTINUED | OUTPATIENT
Start: 2023-08-04 | End: 2023-08-10 | Stop reason: HOSPADM

## 2023-08-04 RX ORDER — 0.9 % SODIUM CHLORIDE 0.9 %
1000 INTRAVENOUS SOLUTION INTRAVENOUS ONCE
Status: COMPLETED | OUTPATIENT
Start: 2023-08-04 | End: 2023-08-04

## 2023-08-04 RX ORDER — SODIUM CHLORIDE 0.9 % (FLUSH) 0.9 %
5-40 SYRINGE (ML) INJECTION EVERY 12 HOURS SCHEDULED
Status: DISCONTINUED | OUTPATIENT
Start: 2023-08-04 | End: 2023-08-10 | Stop reason: HOSPADM

## 2023-08-04 RX ORDER — ONDANSETRON 2 MG/ML
4 INJECTION INTRAMUSCULAR; INTRAVENOUS EVERY 6 HOURS PRN
Status: DISCONTINUED | OUTPATIENT
Start: 2023-08-04 | End: 2023-08-04

## 2023-08-04 RX ORDER — HEPARIN SODIUM 1000 [USP'U]/ML
60 INJECTION, SOLUTION INTRAVENOUS; SUBCUTANEOUS PRN
Status: DISCONTINUED | OUTPATIENT
Start: 2023-08-04 | End: 2023-08-05

## 2023-08-04 RX ORDER — ACETAMINOPHEN 325 MG/1
650 TABLET ORAL ONCE
Status: COMPLETED | OUTPATIENT
Start: 2023-08-04 | End: 2023-08-04

## 2023-08-04 RX ORDER — HEPARIN SODIUM 1000 [USP'U]/ML
30 INJECTION, SOLUTION INTRAVENOUS; SUBCUTANEOUS PRN
Status: DISCONTINUED | OUTPATIENT
Start: 2023-08-04 | End: 2023-08-05

## 2023-08-04 RX ORDER — ASPIRIN 81 MG/1
324 TABLET, CHEWABLE ORAL ONCE
Status: COMPLETED | OUTPATIENT
Start: 2023-08-04 | End: 2023-08-04

## 2023-08-04 RX ORDER — BUMETANIDE 0.25 MG/ML
2 INJECTION INTRAMUSCULAR; INTRAVENOUS 2 TIMES DAILY
Status: DISCONTINUED | OUTPATIENT
Start: 2023-08-04 | End: 2023-08-05

## 2023-08-04 RX ORDER — HEPARIN SODIUM 1000 [USP'U]/ML
4000 INJECTION, SOLUTION INTRAVENOUS; SUBCUTANEOUS ONCE
Status: COMPLETED | OUTPATIENT
Start: 2023-08-04 | End: 2023-08-04

## 2023-08-04 RX ADMIN — ACETAMINOPHEN 650 MG: 325 TABLET ORAL at 16:14

## 2023-08-04 RX ADMIN — ASPIRIN 81 MG CHEWABLE TABLET 324 MG: 81 TABLET CHEWABLE at 16:14

## 2023-08-04 RX ADMIN — SODIUM CHLORIDE, PRESERVATIVE FREE 10 ML: 5 INJECTION INTRAVENOUS at 23:10

## 2023-08-04 RX ADMIN — BUMETANIDE 2 MG: 0.25 INJECTION INTRAMUSCULAR; INTRAVENOUS at 23:03

## 2023-08-04 RX ADMIN — IOPAMIDOL 75 ML: 755 INJECTION, SOLUTION INTRAVENOUS at 16:40

## 2023-08-04 RX ADMIN — HEPARIN SODIUM 4000 UNITS: 1000 INJECTION INTRAVENOUS; SUBCUTANEOUS at 16:50

## 2023-08-04 RX ADMIN — SODIUM CHLORIDE 1000 ML: 9 INJECTION, SOLUTION INTRAVENOUS at 15:12

## 2023-08-04 RX ADMIN — HEPARIN SODIUM 12 UNITS/KG/HR: 10000 INJECTION, SOLUTION INTRAVENOUS at 16:50

## 2023-08-04 ASSESSMENT — PAIN DESCRIPTION - ORIENTATION: ORIENTATION: OTHER (COMMENT)

## 2023-08-04 ASSESSMENT — PAIN - FUNCTIONAL ASSESSMENT
PAIN_FUNCTIONAL_ASSESSMENT: ACTIVITIES ARE NOT PREVENTED
PAIN_FUNCTIONAL_ASSESSMENT: 0-10
PAIN_FUNCTIONAL_ASSESSMENT: NONE - DENIES PAIN
PAIN_FUNCTIONAL_ASSESSMENT: NONE - DENIES PAIN

## 2023-08-04 ASSESSMENT — PAIN DESCRIPTION - LOCATION
LOCATION: HEAD
LOCATION: HEAD

## 2023-08-04 ASSESSMENT — PAIN SCALES - GENERAL
PAINLEVEL_OUTOF10: 0
PAINLEVEL_OUTOF10: 5
PAINLEVEL_OUTOF10: 0
PAINLEVEL_OUTOF10: 5
PAINLEVEL_OUTOF10: 0

## 2023-08-04 ASSESSMENT — HEART SCORE: ECG: 1

## 2023-08-04 ASSESSMENT — PAIN DESCRIPTION - DESCRIPTORS
DESCRIPTORS: ACHING
DESCRIPTORS: ACHING

## 2023-08-04 ASSESSMENT — PAIN DESCRIPTION - PAIN TYPE: TYPE: CHRONIC PAIN

## 2023-08-04 ASSESSMENT — PAIN DESCRIPTION - FREQUENCY: FREQUENCY: CONTINUOUS

## 2023-08-04 NOTE — ED PROVIDER NOTES
Emergency Department Encounter    Patient: Heather Genao  MRN: [id number]  : 1935  Date of Evaluation: 2023  ED Provider:  Genoveva Grover DO    Triage Chief Complaint:   Dizziness and Blurred Vision (Recent complaints of shortness of breath, patient states she was sitting on the side of the bed and became short of breath daughters wanted her to come the the ER no complaints of blurred vision or dizziness)    [place]:  Heather Genao is a 82 y.o. female with history of restless leg, hypertension, hypothyroid, syncope and collapse, vertigo, paroxysmal atrial fibrillation, on flecainide therapy, heart murmur, aortic stenosis, arthritis, hyperlipidemia, hypertension that presents to the emergency department via EMS for dizziness and blurry vision. Upon arrival patient tells nursing she has some shortness of breath sitting on the side of the bed and her daughters want her to come in to be evaluated. Daughter at the bedside states patient has had dizziness and lightheadedness for months. She seen primary care physician been in the emergency department couple times for dizziness lightheadedness and test come back normal so they sent her home. She states patient seen her primary care physician last week for dizziness they did lab work but did not give any new prescriptions. They state patient also seen cardiologist last week they ordered a heart monitor that came in today to the patient's house. She states patient does live alone. States patient is on Coumadin therapy. Patient states she does have dizziness lightheadedness mostly when getting up and walking. Patient admits to weakness all over. She states been going on for a while. She denies any chest pain. Patient states she did have a shortness of breath this morning. Patient denies any nausea vomiting diarrhea no abdominal pain no dysuria hematuria no fever chills. Patient states she has had a cough.   Patient denies any recent falls

## 2023-08-04 NOTE — ED NOTES
Critical Troponin 0.646 relayed to Dr. Janina Decker @ [phone].      Clementine Gutierrez RN  08/04/23 [id number]

## 2023-08-05 LAB
ANION GAP SERPL CALCULATED.3IONS-SCNC: 14 MMOL/L (ref 4–16)
ANTI-XA UNFRAC HEPARIN: 0.4 IU/ML (ref 0.3–0.7)
ANTI-XA UNFRAC HEPARIN: 0.4 IU/ML (ref 0.3–0.7)
ANTI-XA UNFRAC HEPARIN: 0.5 IU/ML (ref 0.3–0.7)
ANTI-XA UNFRAC HEPARIN: 0.6 IU/ML (ref 0.3–0.7)
BASOPHILS ABSOLUTE: 0.1 K/CU MM
BASOPHILS RELATIVE PERCENT: 0.6 % (ref 0–1)
BUN SERPL-MCNC: 22 MG/DL (ref 6–23)
CALCIUM SERPL-MCNC: 8.5 MG/DL (ref 8.3–10.6)
CHLORIDE BLD-SCNC: 91 MMOL/L (ref 99–110)
CO2: 23 MMOL/L (ref 21–32)
CREAT SERPL-MCNC: 1 MG/DL (ref 0.6–1.1)
DIFFERENTIAL TYPE: ABNORMAL
EKG ATRIAL RATE: 76 BPM
EKG ATRIAL RATE: 88 BPM
EKG DIAGNOSIS: NORMAL
EKG DIAGNOSIS: NORMAL
EKG P AXIS: 64 DEGREES
EKG P AXIS: 79 DEGREES
EKG P-R INTERVAL: 216 MS
EKG P-R INTERVAL: 242 MS
EKG Q-T INTERVAL: 456 MS
EKG Q-T INTERVAL: 508 MS
EKG QRS DURATION: 140 MS
EKG QRS DURATION: 152 MS
EKG QTC CALCULATION (BAZETT): 551 MS
EKG QTC CALCULATION (BAZETT): 571 MS
EKG R AXIS: -25 DEGREES
EKG R AXIS: -42 DEGREES
EKG T AXIS: 235 DEGREES
EKG T AXIS: 51 DEGREES
EKG VENTRICULAR RATE: 76 BPM
EKG VENTRICULAR RATE: 88 BPM
EOSINOPHILS ABSOLUTE: 0.3 K/CU MM
EOSINOPHILS RELATIVE PERCENT: 2.4 % (ref 0–3)
GFR SERPL CREATININE-BSD FRML MDRD: 55 ML/MIN/1.73M2
GLUCOSE SERPL-MCNC: 98 MG/DL (ref 70–99)
HCT VFR BLD CALC: 37.5 % (ref 37–47)
HEMOGLOBIN: 12.6 GM/DL (ref 12.5–16)
IMMATURE NEUTROPHIL %: 0.5 % (ref 0–0.43)
LV EF: 43 %
LVEF MODALITY: NORMAL
LYMPHOCYTES ABSOLUTE: 2.5 K/CU MM
LYMPHOCYTES RELATIVE PERCENT: 24.3 % (ref 24–44)
MAGNESIUM: 2 MG/DL (ref 1.8–2.4)
MCH RBC QN AUTO: 30.7 PG (ref 27–31)
MCHC RBC AUTO-ENTMCNC: 33.6 % (ref 32–36)
MCV RBC AUTO: 91.5 FL (ref 78–100)
MONOCYTES ABSOLUTE: 1.3 K/CU MM
MONOCYTES RELATIVE PERCENT: 12.5 % (ref 0–4)
NUCLEATED RBC %: 0 %
PDW BLD-RTO: 14.3 % (ref 11.7–14.9)
PLATELET # BLD: 256 K/CU MM (ref 140–440)
PMV BLD AUTO: 9.5 FL (ref 7.5–11.1)
POTASSIUM SERPL-SCNC: 3.6 MMOL/L (ref 3.5–5.1)
RBC # BLD: 4.1 M/CU MM (ref 4.2–5.4)
SEGMENTED NEUTROPHILS ABSOLUTE COUNT: 6.2 K/CU MM
SEGMENTED NEUTROPHILS RELATIVE PERCENT: 59.7 % (ref 36–66)
SODIUM BLD-SCNC: 128 MMOL/L (ref 135–145)
TOTAL IMMATURE NEUTOROPHIL: 0.05 K/CU MM
TOTAL NUCLEATED RBC: 0 K/CU MM
TROPONIN T: 0.67 NG/ML
WBC # BLD: 10.3 K/CU MM (ref 4–10.5)

## 2023-08-05 PROCEDURE — 2500000003 HC RX 250 WO HCPCS: Performed by: STUDENT IN AN ORGANIZED HEALTH CARE EDUCATION/TRAINING PROGRAM

## 2023-08-05 PROCEDURE — 36415 COLL VENOUS BLD VENIPUNCTURE: CPT

## 2023-08-05 PROCEDURE — 94761 N-INVAS EAR/PLS OXIMETRY MLT: CPT

## 2023-08-05 PROCEDURE — 99222 1ST HOSP IP/OBS MODERATE 55: CPT | Performed by: INTERNAL MEDICINE

## 2023-08-05 PROCEDURE — 2580000003 HC RX 258: Performed by: STUDENT IN AN ORGANIZED HEALTH CARE EDUCATION/TRAINING PROGRAM

## 2023-08-05 PROCEDURE — 2140000000 HC CCU INTERMEDIATE R&B

## 2023-08-05 PROCEDURE — 80048 BASIC METABOLIC PNL TOTAL CA: CPT

## 2023-08-05 PROCEDURE — 85025 COMPLETE CBC W/AUTO DIFF WBC: CPT

## 2023-08-05 PROCEDURE — 93306 TTE W/DOPPLER COMPLETE: CPT

## 2023-08-05 PROCEDURE — 93010 ELECTROCARDIOGRAM REPORT: CPT | Performed by: INTERNAL MEDICINE

## 2023-08-05 PROCEDURE — 84484 ASSAY OF TROPONIN QUANT: CPT

## 2023-08-05 PROCEDURE — 6360000002 HC RX W HCPCS: Performed by: INTERNAL MEDICINE

## 2023-08-05 PROCEDURE — 83735 ASSAY OF MAGNESIUM: CPT

## 2023-08-05 RX ORDER — BUMETANIDE 0.25 MG/ML
1 INJECTION INTRAMUSCULAR; INTRAVENOUS 2 TIMES DAILY
Status: DISCONTINUED | OUTPATIENT
Start: 2023-08-05 | End: 2023-08-07

## 2023-08-05 RX ORDER — ENOXAPARIN SODIUM 100 MG/ML
1 INJECTION SUBCUTANEOUS 2 TIMES DAILY
Status: DISCONTINUED | OUTPATIENT
Start: 2023-08-05 | End: 2023-08-07

## 2023-08-05 RX ADMIN — SODIUM CHLORIDE, PRESERVATIVE FREE 10 ML: 5 INJECTION INTRAVENOUS at 10:26

## 2023-08-05 RX ADMIN — HEPARIN SODIUM 12 UNITS/KG/HR: 10000 INJECTION, SOLUTION INTRAVENOUS at 00:59

## 2023-08-05 RX ADMIN — ENOXAPARIN SODIUM 60 MG: 100 INJECTION SUBCUTANEOUS at 08:59

## 2023-08-05 RX ADMIN — ENOXAPARIN SODIUM 60 MG: 100 INJECTION SUBCUTANEOUS at 21:23

## 2023-08-05 RX ADMIN — SODIUM CHLORIDE, PRESERVATIVE FREE 10 ML: 5 INJECTION INTRAVENOUS at 21:24

## 2023-08-05 NOTE — PROGRESS NOTES
to as low as reasonably achievable. COMPARISON: 02/23/2022 HISTORY: ORDERING SYSTEM PROVIDED HISTORY: dizziness, blurry vision hx of cataract, vertigo, and same TECHNOLOGIST PROVIDED HISTORY: Reason for exam:->dizziness, blurry vision hx of cataract, vertigo, and same Has a \"code stroke\" or \"stroke alert\" been called? ->No Decision Support Exception - unselect if not a suspected or confirmed emergency medical condition->Emergency Medical Condition (MA) Initial evaluation FINDINGS: BRAIN/VENTRICLES:  The ventricles and cisternal spaces are prominent consistent with cerebral atrophy. There is atherosclerotic calcification of the cavernous carotids. There are confluent areas of hypoattenuation in the periventricular white matter and centrum semiovale that are likely related to chronic small vessel ischemic disease. There is a remote lacunar infarct in the right basal ganglia. There is no midline shift or mass effect. No hemorrhage is identified in the brain parenchyma. ORBITS: The visualized portion of the orbits demonstrate no acute abnormality. SINUSES:  The visualized paranasal sinuses and mastoid air cells are for the most part clear. SOFT TISSUES/SKULL:  No acute abnormality of the visualized skull or soft tissues. No acute intracranial abnormality. Cerebral atrophy. Remote lacunar infarct in the right basal ganglia. Chronic small vessel ischemic changes. XR CHEST PORTABLE    Result Date: 8/5/2023  EXAMINATION: ONE XRAY VIEW OF THE CHEST 8/4/2023 10:42 pm COMPARISON: August 4, 2023. HISTORY: ORDERING SYSTEM PROVIDED HISTORY: pulmonary edema TECHNOLOGIST PROVIDED HISTORY: Reason for exam:->pulmonary edema Reason for Exam: pulmonary edema FINDINGS: No lines or tubes. Stable cardiomediastinal silhouette. Subtle reticular opacities, stable from the most recent radiograph but increased since June 2023. No dense consolidation or significant pleural effusions. No suspicious pulmonary nodules.   No Support Exception - unselect if not a suspected or confirmed emergency medical condition->Emergency Medical Condition (MA) Reason for Exam: elevated troponin, elevated bnp, rule out pe chest xray no acute pulmonary edema Additional signs and symptoms: 75ml isovue 370 @ lt wrist @ 1635hrs, gfr>60, creat 0.8, 8-4-2023, elevated troponin, elevated bnp, rule out pe chest xray no acute pulmonary edema FINDINGS: Pulmonary Arteries: Pulmonary arteries are adequately opacified for evaluation. No evidence of intraluminal filling defect to suggest pulmonary embolism. Main pulmonary artery is normal in caliber. Mediastinum: No evidence of mediastinal lymphadenopathy. The heart and pericardium demonstrate no acute abnormality. There is no acute abnormality of the thoracic aorta. Lungs/pleura: Tiny bilateral pleural effusions are identified. .  No focal consolidation or pulmonary edema. No evidence of pleural effusion or pneumothorax. Upper Abdomen: Limited images of the upper abdomen are unremarkable. Soft Tissues/Bones: No acute bone or soft tissue abnormality.      No evidence of pulmonary embolism Tiny bilateral pleural effusions       CBC:   Recent Labs     08/04/23  1513 08/05/23  0505   WBC 11.6* 10.3   HGB 13.9 12.6    256     BMP:    Recent Labs     08/04/23  1513 08/05/23  0505   * 128*   K 4.7 3.6   CL 94* 91*   CO2 23 23   BUN 22 22   CREATININE 0.8 1.0   GLUCOSE 116* 98     Hepatic:   Recent Labs     08/04/23  1513   AST 40*   ALT 14   BILITOT 1.8*   ALKPHOS 53     Lipids:   Lab Results   Component Value Date/Time    CHOL 120 08/05/2013 09:06 AM    HDL 44 08/05/2013 09:06 AM    TRIG 167 08/05/2013 09:06 AM     Hemoglobin A1C: No results found for: LABA1C  TSH: No results found for: TSH  Troponin:   Lab Results   Component Value Date/Time    TROPONINT 0.667 08/05/2023 12:14 AM    TROPONINT 0.548 08/04/2023 05:00 PM    TROPONINT 0.646 08/04/2023 03:13 PM     Lactic Acid: No results for input(s): LACTA

## 2023-08-05 NOTE — PROGRESS NOTES
Nutrition Note    Admit with NSTEMI. NPO this morning but able to resume cardiac diet for lunch. Consult on admit for heart failure diet. Patient feeling hungry with little intake in past day. Family in room assist/provide patient with meals at home and indicated patient would not be happy with limiting salt. Will defer diet ed at this time and continue to follow up during stay.     Electronically signed by Yazmin Cade RD, SHIRA on 8/5/23 at 10:47 AM EDT    Contact: [phone]

## 2023-08-05 NOTE — H&P
V2.0  History and Physical      Name:  Marcella Tao /Age/Sex: 1935  (82 y.o. female)   MRN & CSN:  [id number] & [id number] Encounter Date/Time: 2023 10:43 PM EDT   Location:  [address] [place] [address] PCP: Aston Romero MD       Assessment and Plan:   Marcella Tao is a 82 y.o. female with past medical history of paroxysmal A-fib, hypertension, hyperlipidemia, GERD, hypothyroidism who presents with NSTEMI (non-ST elevated myocardial infarction) [facility]    Hospital Problems             Last Modified POA    * (Principal) NSTEMI (non-ST elevated myocardial infarction) ([address]) 2023 Yes     Syncope and collapse  Recurrent Lightheadedness  Likely secondary to underlying heart failure and NSTEMI, multiple recent ED visits and hospitalization, concern was for vasovagal episode  Fall precautions  Rest of the management as below    NSTEMI  0.646>0.548  EKG with SR, 76/min, Qtc 571ms, LBBB which appears new from prior EKG  CXR w/o acute abnormality  Aspirin load in ED  Admit to inpatient, stepdown  Start Aspirin and Lipitor   IV Heparin drip  ED provider discussed with cardiology, recommended Heparin drip and Lasix     Acute Heart Failure w/ concern for BiV failure and inferoseptal RWMA  Possible etiologies for exacerbation include VHD, cardiac ischemia.    ProBNP 31k  CTA chest with enlarged RV and PA trunk, contrast extravasation in hepatic veins  Prior Echocardiogram,   IV Bumex 2mg BID   I/O and measure daily weight   Repeat Echocardiogram     Mild Hyponatremia   Secondary to volume overload   129 on admission   Diuresis as above  Recheck in am    Paroxysmal A-Fib  Long term use of Coumadin  INR 1.6  Hold flecainide and Lopressor  Hold Coumadin    Inpatient, stepdown with telemetry  [facility]    Disposition:   Current Living situation: Home  Expected Disposition: Home  Estimated D/C: 3-4 days    Diet Diet NPO   DVT Prophylaxis [x] Lovenox, []  Heparin, [] SCDs, [] Ambulation,  [] Eliquis, [] Xarelto, [] Coumadin There is no midline shift or mass effect. No hemorrhage is identified in the brain parenchyma. ORBITS: The visualized portion of the orbits demonstrate no acute abnormality. SINUSES:  The visualized paranasal sinuses and mastoid air cells are for the most part clear. SOFT TISSUES/SKULL:  No acute abnormality of the visualized skull or soft tissues. No acute intracranial abnormality. Cerebral atrophy. Remote lacunar infarct in the right basal ganglia. Chronic small vessel ischemic changes. XR CHEST PORTABLE    Result Date: 8/4/2023  EXAMINATION: ONE XRAY VIEW OF THE CHEST 8/4/2023 2:12 pm COMPARISON: 06/21/2023 HISTORY: ORDERING SYSTEM PROVIDED HISTORY: Chest Pain TECHNOLOGIST PROVIDED HISTORY: Reason for exam:->Chest Pain Reason for Exam: Chest Pain Additional signs and symptoms: Chest Pain Relevant Medical/Surgical History: Chest Pain Initial evaluation. FINDINGS: Monitor wires overlie the chest.  The trachea is midline. There is atherosclerotic calcification of the aortic knob. Cardiac silhouette is unremarkable. There is prominence of the interstitial markings which appears to be in part chronic. No obvious superimposed acute process. There is no pleural fluid. There is a left shoulder replacement. There are mild degenerative changes of the right shoulder. Stable appearance to the chest with chronic interstitial markings. No obvious superimposed acute process. CTA PULMONARY W CONTRAST    Result Date: 8/4/2023  EXAMINATION: CTA OF THE CHEST 8/4/2023 4:21 pm TECHNIQUE: CTA of the chest was performed after the administration of intravenous contrast.  Multiplanar reformatted images are provided for review. MIP images are provided for review. Automated exposure control, iterative reconstruction, and/or weight based adjustment of the mA/kV was utilized to reduce the radiation dose to as low as reasonably achievable. COMPARISON: None.  HISTORY: ORDERING SYSTEM PROVIDED HISTORY: elevated

## 2023-08-05 NOTE — PROGRESS NOTES
4 Eyes Skin Assessment     NAME:  Sayda Disla  YOB: 1935  MEDICAL RECORD NUMBER:  [id number]    The patient is being assessed for  Admission    I agree that at least one RN has performed a thorough Head to Toe Skin Assessment on the patient. ALL assessment sites listed below have been assessed. Areas assessed by both nurses:    Head, Face, Ears, Shoulders, Back, Chest, Arms, Elbows, Hands, Sacrum. Buttock, Coccyx, Ischium, Legs. Feet and Heels, and Under Medical Devices         Does the Patient have a Wound?  No noted wound(s)       Grady Prevention initiated by RN: Yes  Wound Care Orders initiated by RN: Yes    Pressure Injury (Stage 3,4, Unstageable, DTI, NWPT, and Complex wounds) if present, place Wound referral order by RN under : No    New Ostomies, if present place, Ostomy referral order under : No     Nurse 1 eSignature: Electronically signed by Diana Singleton RN on 8/5/23 at 5:42 AM EDT    **SHARE this note so that the co-signing nurse can place an eSignature**    Nurse 2 eSignature: Electronically signed by Aline Madera RN on 8/5/23 at 6:07 AM EDT

## 2023-08-06 LAB
ANION GAP SERPL CALCULATED.3IONS-SCNC: 12 MMOL/L (ref 4–16)
ANTI-XA UNFRAC HEPARIN: 0.4 IU/ML (ref 0.3–0.7)
BUN SERPL-MCNC: 24 MG/DL (ref 6–23)
CALCIUM SERPL-MCNC: 8.3 MG/DL (ref 8.3–10.6)
CHLORIDE BLD-SCNC: 94 MMOL/L (ref 99–110)
CO2: 24 MMOL/L (ref 21–32)
CREAT SERPL-MCNC: 1.1 MG/DL (ref 0.6–1.1)
CULTURE: NORMAL
GFR SERPL CREATININE-BSD FRML MDRD: 49 ML/MIN/1.73M2
GLUCOSE SERPL-MCNC: 106 MG/DL (ref 70–99)
Lab: NORMAL
MAGNESIUM: 1.9 MG/DL (ref 1.8–2.4)
POTASSIUM SERPL-SCNC: 3.4 MMOL/L (ref 3.5–5.1)
REASON FOR REJECTION: NORMAL
REJECTED TEST: NORMAL
SODIUM BLD-SCNC: 130 MMOL/L (ref 135–145)
SPECIMEN: NORMAL

## 2023-08-06 PROCEDURE — 2580000003 HC RX 258: Performed by: STUDENT IN AN ORGANIZED HEALTH CARE EDUCATION/TRAINING PROGRAM

## 2023-08-06 PROCEDURE — 2500000003 HC RX 250 WO HCPCS: Performed by: STUDENT IN AN ORGANIZED HEALTH CARE EDUCATION/TRAINING PROGRAM

## 2023-08-06 PROCEDURE — 36415 COLL VENOUS BLD VENIPUNCTURE: CPT

## 2023-08-06 PROCEDURE — 99233 SBSQ HOSP IP/OBS HIGH 50: CPT | Performed by: INTERNAL MEDICINE

## 2023-08-06 PROCEDURE — 6370000000 HC RX 637 (ALT 250 FOR IP): Performed by: STUDENT IN AN ORGANIZED HEALTH CARE EDUCATION/TRAINING PROGRAM

## 2023-08-06 PROCEDURE — 6360000002 HC RX W HCPCS: Performed by: STUDENT IN AN ORGANIZED HEALTH CARE EDUCATION/TRAINING PROGRAM

## 2023-08-06 PROCEDURE — 94761 N-INVAS EAR/PLS OXIMETRY MLT: CPT

## 2023-08-06 PROCEDURE — 6360000002 HC RX W HCPCS: Performed by: INTERNAL MEDICINE

## 2023-08-06 PROCEDURE — 2140000000 HC CCU INTERMEDIATE R&B

## 2023-08-06 PROCEDURE — APPNB60 APP NON BILLABLE TIME 46-60 MINS: Performed by: NURSE PRACTITIONER

## 2023-08-06 PROCEDURE — 83735 ASSAY OF MAGNESIUM: CPT

## 2023-08-06 PROCEDURE — 80048 BASIC METABOLIC PNL TOTAL CA: CPT

## 2023-08-06 RX ORDER — MORPHINE SULFATE 2 MG/ML
2 INJECTION, SOLUTION INTRAMUSCULAR; INTRAVENOUS EVERY 4 HOURS PRN
Status: DISCONTINUED | OUTPATIENT
Start: 2023-08-06 | End: 2023-08-10 | Stop reason: HOSPADM

## 2023-08-06 RX ORDER — GUAIFENESIN/DEXTROMETHORPHAN 100-10MG/5
5 SYRUP ORAL EVERY 4 HOURS PRN
Status: DISCONTINUED | OUTPATIENT
Start: 2023-08-06 | End: 2023-08-10 | Stop reason: HOSPADM

## 2023-08-06 RX ORDER — LORAZEPAM 0.5 MG/1
0.5 TABLET ORAL
OUTPATIENT
Start: 2023-08-06 | End: 2023-08-07

## 2023-08-06 RX ADMIN — BUMETANIDE 1 MG: 0.25 INJECTION INTRAMUSCULAR; INTRAVENOUS at 09:53

## 2023-08-06 RX ADMIN — SODIUM CHLORIDE, PRESERVATIVE FREE 10 ML: 5 INJECTION INTRAVENOUS at 09:54

## 2023-08-06 RX ADMIN — SODIUM CHLORIDE, PRESERVATIVE FREE 10 ML: 5 INJECTION INTRAVENOUS at 22:12

## 2023-08-06 RX ADMIN — ENOXAPARIN SODIUM 60 MG: 100 INJECTION SUBCUTANEOUS at 22:11

## 2023-08-06 RX ADMIN — ENOXAPARIN SODIUM 60 MG: 100 INJECTION SUBCUTANEOUS at 09:52

## 2023-08-06 RX ADMIN — MICONAZOLE NITRATE: 2 POWDER TOPICAL at 14:01

## 2023-08-06 RX ADMIN — MICONAZOLE NITRATE: 2 POWDER TOPICAL at 22:12

## 2023-08-06 RX ADMIN — POTASSIUM CHLORIDE 40 MEQ: 1500 TABLET, EXTENDED RELEASE ORAL at 15:05

## 2023-08-06 RX ADMIN — MORPHINE SULFATE 2 MG: 2 INJECTION, SOLUTION INTRAMUSCULAR; INTRAVENOUS at 14:02

## 2023-08-06 ASSESSMENT — PAIN DESCRIPTION - LOCATION
LOCATION: CHEST
LOCATION: CHEST

## 2023-08-06 ASSESSMENT — PAIN - FUNCTIONAL ASSESSMENT
PAIN_FUNCTIONAL_ASSESSMENT: ACTIVITIES ARE NOT PREVENTED
PAIN_FUNCTIONAL_ASSESSMENT: PREVENTS OR INTERFERES SOME ACTIVE ACTIVITIES AND ADLS

## 2023-08-06 ASSESSMENT — PAIN SCALES - GENERAL
PAINLEVEL_OUTOF10: 8
PAINLEVEL_OUTOF10: 1
PAINLEVEL_OUTOF10: 0

## 2023-08-06 ASSESSMENT — PAIN DESCRIPTION - ORIENTATION: ORIENTATION: LEFT

## 2023-08-06 ASSESSMENT — PAIN DESCRIPTION - DESCRIPTORS
DESCRIPTORS: ACHING;STABBING;SHARP
DESCRIPTORS: ACHING

## 2023-08-06 NOTE — PROGRESS NOTES
Cardiology Progress Note     Today's Plan keep NPO after midnight   Plan for C tomorrow     Admit Date:  8/4/2023    Consult reason/ Seen today for: possible NSTEMI    Subjective and  Overnight Events:  shortness of breath is improving- she denies CP    Discussed abn troponin/ CHF with patient and family- recommend [facility] to define coronary anatomy- risk and benefits discussed including stroke/heart attack/ death/ bleeding /infection and possible renal dysfunction/failure. They would like to proceed with [facility] possible PCI. Telemetry: SR    Assessment / Plan:     NSTEMI- ruled in for NSTEMI: peak troponin 0.667-currently chest pain free:Transthoracic echo shows mildly depressed EF and hypokinesis of apical/inferior segments :  on full dose lovenox: patient and family would like to proceed with C :   Combined systolic diastolic HF: new onset: EF 40-45% grade II DD and mild LVH: BNP >31K- + dyspnea on admit: started on bumex- symptoms are improving  Echo done - Ef 40-45 %   Paroxysmal atrial fib : currently in sinus ; on lovenox  Hypertension-bp is soft   Syncope- no further episodes : holding tambocor ? Arrhythmia for cause of syncope  VHD- moderate AS  LBBB      History of Presenting Illness:    Chief complain on admission : 82 y. o.year old who is admitted forNo chief complaint on file. Past medical history:    has a past medical history of Aortic stenosis, mild, Arthritis, Diverticulitis, Easy bruising, H/O cardiovascular stress test, H/O Doppler echocardiogram, H/O echocardiogram, Heart murmur, History of Holter monitoring, Hyperlipidemia, Hypertension, Leg cramps, PAF (paroxysmal atrial fibrillation) ([address]), Syncope and collapse, and Thyroid disease. Past surgical history:   has a past surgical history that includes Cholecystectomy; Thyroid surgery; Abdomen surgery; Colonoscopy; joint replacement; joint replacement;  Eye proarrhythmic at times hence we will hold off on that for now             Uche Durbin MD [facility]

## 2023-08-06 NOTE — PROGRESS NOTES
V2.0    [facility] Progress Note      Name:  Rosy Iglesias /Age/Sex: 1935  (82 y.o. female)   MRN & CSN:  [id number] & [id number] Encounter Date/Time: 2023 1:54 PM EDT   Location:   PCP: Jacob Ha MD     Attending:Spencer Regalado MD       Hospital Day: 3    Assessment and Recommendations   Rosy Iglesias is a 82 y.o. female  who presents with NSTEMI (non-ST elevated myocardial infarction) ([address])      1. Syncope and collapse  Recurrent Lightheadedness  Likely secondary to underlying heart failure and NSTEMI, multiple recent ED visits and hospitalization, concern was for vasovagal episode  Fall precautions  Rest of the management as below     2. NSTEMI  0.646>0.548  EKG with SR, 76/min, Qtc 571ms, LBBB which appears new from prior EKG  CXR w/o acute abnormality  Was started on IV Heparin drip now discontinued  Continue aspirin and Plavix  Cardiology following      3. Acute Heart Failure w/ concern for BiV failure and inferoseptal RWMA  ProBNP 31k  -2D echo 2020 through show EF of 55%. Repeat on this admission show EF of 40 to 45% with apical hypokinesis  CTA chest with enlarged RV and PA trunk, contrast extravasation in hepatic veins  IV Bumex 2mg BID   I/O and measure daily weight   Cardiology consulted    4. Mild Hyponatremia   Secondary to volume overload   129 on admission   Diuresis as above  Recheck in am     5. Paroxysmal A-Fib  Long term use of Coumadin  INR 1.6  Hold flecainide and Lopressor  Hold Coumadin     Inpatient, stepdown with telemetry  [facility]      Diet ADULT DIET;  Regular; Low Fat/Low Chol/High Fiber/2 gm Na   DVT Prophylaxis [] Lovenox, []  Heparin, [] SCDs, [] Ambulation,  [] Eliquis, [] Xarelto  [] Coumadin   Code Status DNR-CCA   Disposition From: Home  Expected Disposition: TBD  Estimated Date of Discharge: TBD  Patient requires continued admission due to NSTEMI   Surrogate Decision Maker/ POA               Subjective:     Chief Complaint:     Patient seen TROPONINT 0.646 08/04/2023 03:13 PM     Lactic Acid: No results for input(s): LACTA in the last 72 hours.   BNP:   Recent Labs     08/04/23  1513   PROBNP 31,667*     UA:  Lab Results   Component Value Date/Time    NITRU NEGATIVE 08/04/2023 04:07 PM    COLORU YELLOW 08/04/2023 04:07 PM    WBCUA 8 08/04/2023 04:07 PM    RBCUA 10 08/04/2023 04:07 PM    MUCUS NEGATIVE 02/07/2012 08:28 AM    BACTERIA NEGATIVE 08/04/2023 04:07 PM    CLARITYU CLEAR 08/04/2023 04:07 PM    SPECGRAV 1.020 08/04/2023 04:07 PM    LEUKOCYTESUR SMALL NUMBER OR AMOUNT OBSERVED 08/04/2023 04:07 PM    UROBILINOGEN 1.0 08/04/2023 04:07 PM    BILIRUBINUR NEGATIVE 08/04/2023 04:07 PM    BLOODU MODERATE NUMBER OR AMOUNT OBSERVED 08/04/2023 04:07 PM    KETUA TRACE 08/04/2023 04:07 PM     Urine Cultures: No results found for: LABURIN  Blood Cultures: No results found for: BC  No results found for: BLOODCULT2  Organism: No results found for: [facility]      Electronically signed by Sharon Salgado MD on 8/6/2023 at 11:46 AM

## 2023-08-07 LAB
ANION GAP SERPL CALCULATED.3IONS-SCNC: 11 MMOL/L (ref 4–16)
ANTI-XA UNFRAC HEPARIN: 0.5 IU/ML (ref 0.3–0.7)
ANTI-XA UNFRAC HEPARIN: 0.7 IU/ML (ref 0.3–0.7)
ANTI-XA UNFRAC HEPARIN: 0.8 IU/ML (ref 0.3–0.7)
B PARAP IS1001 DNA NPH QL NAA+NON-PROBE: NOT DETECTED
B PERT.PT PRMT NPH QL NAA+NON-PROBE: NOT DETECTED
BACTERIA: NEGATIVE /HPF
BILIRUBIN URINE: NEGATIVE
BLOOD, URINE: NORMAL
BUN SERPL-MCNC: 21 MG/DL (ref 6–23)
C PNEUM DNA NPH QL NAA+NON-PROBE: NOT DETECTED
CALCIUM SERPL-MCNC: 8.3 MG/DL (ref 8.3–10.6)
CHLORIDE BLD-SCNC: 95 MMOL/L (ref 99–110)
CLARITY: NORMAL
CO2: 24 MMOL/L (ref 21–32)
COLOR: NORMAL
CREAT SERPL-MCNC: 1.1 MG/DL (ref 0.6–1.1)
FLUAV H1 2009 PAN RNA NPH NAA+NON-PROBE: NOT DETECTED
FLUAV H1 RNA NPH QL NAA+NON-PROBE: NOT DETECTED
FLUAV H3 RNA NPH QL NAA+NON-PROBE: NOT DETECTED
FLUAV RNA NPH QL NAA+NON-PROBE: NOT DETECTED
FLUBV RNA NPH QL NAA+NON-PROBE: NOT DETECTED
GFR SERPL CREATININE-BSD FRML MDRD: 49 ML/MIN/1.73M2
GLUCOSE SERPL-MCNC: 111 MG/DL (ref 70–99)
GLUCOSE, URINE: NEGATIVE MG/DL
HADV DNA NPH QL NAA+NON-PROBE: NOT DETECTED
HCOV 229E RNA NPH QL NAA+NON-PROBE: NOT DETECTED
HCOV HKU1 RNA NPH QL NAA+NON-PROBE: NOT DETECTED
HCOV NL63 RNA NPH QL NAA+NON-PROBE: NOT DETECTED
HCOV OC43 RNA NPH QL NAA+NON-PROBE: NOT DETECTED
HCT VFR BLD CALC: 37 % (ref 37–47)
HEMOGLOBIN: 12.1 GM/DL (ref 12.5–16)
HMPV RNA NPH QL NAA+NON-PROBE: NOT DETECTED
HPIV1 RNA NPH QL NAA+NON-PROBE: NOT DETECTED
HPIV2 RNA NPH QL NAA+NON-PROBE: NOT DETECTED
HPIV3 RNA NPH QL NAA+NON-PROBE: NOT DETECTED
HPIV4 RNA NPH QL NAA+NON-PROBE: NOT DETECTED
KETONES, URINE: NEGATIVE MG/DL
LEUKOCYTE ESTERASE, URINE: NORMAL
M PNEUMO DNA NPH QL NAA+NON-PROBE: NOT DETECTED
MAGNESIUM: 2 MG/DL (ref 1.8–2.4)
MCH RBC QN AUTO: 30.5 PG (ref 27–31)
MCHC RBC AUTO-ENTMCNC: 32.7 % (ref 32–36)
MCV RBC AUTO: 93.2 FL (ref 78–100)
MUCUS: NORMAL HPF
NITRITE URINE, QUANTITATIVE: POSITIVE
PDW BLD-RTO: 14.4 % (ref 11.7–14.9)
PH, URINE: 6.5
PLATELET # BLD: 283 K/CU MM (ref 140–440)
PMV BLD AUTO: 9.4 FL (ref 7.5–11.1)
POTASSIUM SERPL-SCNC: 4.2 MMOL/L (ref 3.5–5.1)
PROTEIN UA: 100 MG/DL
RBC # BLD: 3.97 M/CU MM (ref 4.2–5.4)
RBC URINE: 1603 /HPF
REASON FOR REJECTION: NORMAL
REJECTED TEST: NORMAL
RSV RNA NPH QL NAA+NON-PROBE: NOT DETECTED
RV+EV RNA NPH QL NAA+NON-PROBE: NOT DETECTED
SARS-COV-2 RNA NPH QL NAA+NON-PROBE: NOT DETECTED
SODIUM BLD-SCNC: 130 MMOL/L (ref 135–145)
SPECIFIC GRAVITY UA: 1.01
T4 FREE SERPL-MCNC: 1.51 NG/DL (ref 0.9–1.8)
TRICHOMONAS: NORMAL /HPF
TSH SERPL DL<=0.005 MIU/L-ACNC: 1.68 UIU/ML (ref 0.27–4.2)
UROBILINOGEN, URINE: 4 MG/DL
WBC # BLD: 9.1 K/CU MM (ref 4–10.5)
WBC UA: 128 /HPF

## 2023-08-07 PROCEDURE — 97166 OT EVAL MOD COMPLEX 45 MIN: CPT

## 2023-08-07 PROCEDURE — 6360000002 HC RX W HCPCS

## 2023-08-07 PROCEDURE — 84439 ASSAY OF FREE THYROXINE: CPT

## 2023-08-07 PROCEDURE — 2140000000 HC CCU INTERMEDIATE R&B

## 2023-08-07 PROCEDURE — 6360000002 HC RX W HCPCS: Performed by: INTERNAL MEDICINE

## 2023-08-07 PROCEDURE — 0202U NFCT DS 22 TRGT SARS-COV-2: CPT

## 2023-08-07 PROCEDURE — 36415 COLL VENOUS BLD VENIPUNCTURE: CPT

## 2023-08-07 PROCEDURE — 36410 VNPNXR 3YR/> PHY/QHP DX/THER: CPT

## 2023-08-07 PROCEDURE — 80048 BASIC METABOLIC PNL TOTAL CA: CPT

## 2023-08-07 PROCEDURE — 93458 L HRT ARTERY/VENTRICLE ANGIO: CPT | Performed by: INTERNAL MEDICINE

## 2023-08-07 PROCEDURE — 76937 US GUIDE VASCULAR ACCESS: CPT

## 2023-08-07 PROCEDURE — 6360000004 HC RX CONTRAST MEDICATION

## 2023-08-07 PROCEDURE — 2709999900 HC NON-CHARGEABLE SUPPLY

## 2023-08-07 PROCEDURE — 6370000000 HC RX 637 (ALT 250 FOR IP): Performed by: INTERNAL MEDICINE

## 2023-08-07 PROCEDURE — 6370000000 HC RX 637 (ALT 250 FOR IP)

## 2023-08-07 PROCEDURE — C1894 INTRO/SHEATH, NON-LASER: HCPCS

## 2023-08-07 PROCEDURE — B2111ZZ FLUOROSCOPY OF MULTIPLE CORONARY ARTERIES USING LOW OSMOLAR CONTRAST: ICD-10-PCS | Performed by: INTERNAL MEDICINE

## 2023-08-07 PROCEDURE — 2580000003 HC RX 258: Performed by: STUDENT IN AN ORGANIZED HEALTH CARE EDUCATION/TRAINING PROGRAM

## 2023-08-07 PROCEDURE — 81001 URINALYSIS AUTO W/SCOPE: CPT

## 2023-08-07 PROCEDURE — 99232 SBSQ HOSP IP/OBS MODERATE 35: CPT | Performed by: INTERNAL MEDICINE

## 2023-08-07 PROCEDURE — 2500000003 HC RX 250 WO HCPCS

## 2023-08-07 PROCEDURE — 93458 L HRT ARTERY/VENTRICLE ANGIO: CPT

## 2023-08-07 PROCEDURE — C1769 GUIDE WIRE: HCPCS

## 2023-08-07 PROCEDURE — 97530 THERAPEUTIC ACTIVITIES: CPT

## 2023-08-07 PROCEDURE — 94761 N-INVAS EAR/PLS OXIMETRY MLT: CPT

## 2023-08-07 PROCEDURE — 4A023N7 MEASUREMENT OF CARDIAC SAMPLING AND PRESSURE, LEFT HEART, PERCUTANEOUS APPROACH: ICD-10-PCS | Performed by: INTERNAL MEDICINE

## 2023-08-07 PROCEDURE — 84443 ASSAY THYROID STIM HORMONE: CPT

## 2023-08-07 PROCEDURE — 85027 COMPLETE CBC AUTOMATED: CPT

## 2023-08-07 PROCEDURE — 83735 ASSAY OF MAGNESIUM: CPT

## 2023-08-07 RX ORDER — SODIUM CHLORIDE 9 MG/ML
INJECTION, SOLUTION INTRAVENOUS PRN
Status: DISCONTINUED | OUTPATIENT
Start: 2023-08-07 | End: 2023-08-10 | Stop reason: HOSPADM

## 2023-08-07 RX ORDER — LEVOTHYROXINE SODIUM 0.07 MG/1
75 TABLET ORAL DAILY
Status: DISCONTINUED | OUTPATIENT
Start: 2023-08-07 | End: 2023-08-10 | Stop reason: HOSPADM

## 2023-08-07 RX ORDER — FUROSEMIDE 20 MG/1
20 TABLET ORAL DAILY
Status: DISCONTINUED | OUTPATIENT
Start: 2023-08-08 | End: 2023-08-10 | Stop reason: HOSPADM

## 2023-08-07 RX ORDER — SODIUM CHLORIDE 0.9 % (FLUSH) 0.9 %
5-40 SYRINGE (ML) INJECTION PRN
Status: DISCONTINUED | OUTPATIENT
Start: 2023-08-07 | End: 2023-08-10 | Stop reason: HOSPADM

## 2023-08-07 RX ORDER — SODIUM CHLORIDE 0.9 % (FLUSH) 0.9 %
5-40 SYRINGE (ML) INJECTION EVERY 12 HOURS SCHEDULED
Status: DISCONTINUED | OUTPATIENT
Start: 2023-08-07 | End: 2023-08-10 | Stop reason: HOSPADM

## 2023-08-07 RX ORDER — 0.9 % SODIUM CHLORIDE 0.9 %
500 INTRAVENOUS SOLUTION INTRAVENOUS ONCE
Status: DISCONTINUED | OUTPATIENT
Start: 2023-08-07 | End: 2023-08-10 | Stop reason: HOSPADM

## 2023-08-07 RX ORDER — FLECAINIDE ACETATE 50 MG/1
50 TABLET ORAL 2 TIMES DAILY
Status: DISCONTINUED | OUTPATIENT
Start: 2023-08-07 | End: 2023-08-07

## 2023-08-07 RX ORDER — METOPROLOL SUCCINATE 25 MG/1
25 TABLET, EXTENDED RELEASE ORAL DAILY
Status: DISCONTINUED | OUTPATIENT
Start: 2023-08-07 | End: 2023-08-10 | Stop reason: HOSPADM

## 2023-08-07 RX ORDER — CALCIUM CARBONATE 500 MG/1
500 TABLET, CHEWABLE ORAL 3 TIMES DAILY PRN
Status: DISCONTINUED | OUTPATIENT
Start: 2023-08-07 | End: 2023-08-10 | Stop reason: HOSPADM

## 2023-08-07 RX ORDER — ENOXAPARIN SODIUM 100 MG/ML
40 INJECTION SUBCUTANEOUS DAILY
Status: DISCONTINUED | OUTPATIENT
Start: 2023-08-07 | End: 2023-08-10 | Stop reason: HOSPADM

## 2023-08-07 RX ORDER — ACETAMINOPHEN 325 MG/1
650 TABLET ORAL EVERY 4 HOURS PRN
Status: DISCONTINUED | OUTPATIENT
Start: 2023-08-07 | End: 2023-08-10 | Stop reason: HOSPADM

## 2023-08-07 RX ADMIN — METOPROLOL SUCCINATE 25 MG: 25 TABLET, FILM COATED, EXTENDED RELEASE ORAL at 09:01

## 2023-08-07 RX ADMIN — SODIUM CHLORIDE, PRESERVATIVE FREE 10 ML: 5 INJECTION INTRAVENOUS at 21:53

## 2023-08-07 RX ADMIN — MICONAZOLE NITRATE: 2 POWDER TOPICAL at 21:52

## 2023-08-07 RX ADMIN — LEVOTHYROXINE SODIUM 75 MCG: 0.07 TABLET ORAL at 09:01

## 2023-08-07 RX ADMIN — ENOXAPARIN SODIUM 40 MG: 100 INJECTION SUBCUTANEOUS at 09:01

## 2023-08-07 NOTE — PROGRESS NOTES
Plan for cath due to abnormal troponin   Alternates and risk of the procedure were dicussed in detail  Patient is in agreement to proceed  Mallampati is 2  ASA is 2   Called [phone] but no reply

## 2023-08-07 NOTE — PROGRESS NOTES
Physician Progress Note      PATIENTPhilipp Bloch  CSN #:                  [id number]  :                       1935  ADMIT DATE:       2023 10:00 PM  [address] [facility] DATE:  RESPONDING  PROVIDER #:        Marycruz Dunham MD          QUERY TEXT:    Internal Medicine,    Patient admitted with syncope secondary to underlying heart failure and   NSTEMI. It is unclear if Cardiology confirmed or ruled out NSTEMI. If   possible, please document in the progress notes and discharge summary  any of   the following: The medical record reflects the following:  Risk Factors: apical ballooning  Clinical Indicators: Per Cardiology documentation: \"Cardiac cath done this   morning showed no CAD, Elevated troponin and abnormal echo probably secondary   to apical ballooning i.e. Takotsubo syndrome  Treatment: labs, imaging, Cardiology consult,  ECHO, cardiac cath, Bumex,   medical management    Thank you,  Darwin Hayes RN [facility]  [phone]  Options provided:  -- NSTEMI  -- Type 2 MI  -- Demand Ischemia with MI  -- Demand Ischemia only, no MI  -- Other - I will add my own diagnosis  -- Disagree - Not applicable / Not valid  -- Disagree - Clinically unable to determine / Unknown  -- Refer to Clinical Documentation Reviewer    PROVIDER RESPONSE TEXT:    This patient has an NSTEMI.     Query created by: Darwin Hayes on 2023 12:41 PM      Electronically signed by:  Marycruz Dunham MD 2023 12:45 PM

## 2023-08-07 NOTE — PROGRESS NOTES
Met with patient, daughter and grand daughter (by Phone). Introduced myself as the Heart failure education R.N. Patient reports grief from losing her only son to cancer in January. Patient currently lives a lone in an apartment. Family has been providing meals, like fast food and TV dinners. Patient drinks 4 sodas  daily. Currently patient is very weak and not eating. Admitting diagnosis- NSTEMI  Cardiologist- MEIR Rosado   Heart Failure Education Nurse consulted- yes  Ejection fraction 40-45% as of 8/5 with grade II DD  Pro BNP- 31,667 on 8/4  Hospital follow up appt-  8/15 Dr. Miranda Franklin and 8/21 Dr. Anel Rosado (previously scheduled)  Patient informed of appointments  Cardiac rehabilitation referral- N/A   ICD information- N/A   Patient informed of Heart Failure Clinic at the [facility]  Smoking Cessation information and referral-N/A   Pneumonia vaccine- 2015  PCP- Loren   Patient has a digital scale? Given to patient   Transportation- daughter takes to appointments      Able to obtain medications without difficulty? - Yes- Patient denies difficulty in obtaining or taking medications. Advised not to stop taking a medication without notifying provider. Heart failure specific Medications-  Metoprolol, Lasix, Potassium    Reviewed Heart failure patient education book with patient. Heart failure education included: Type of heart failure, How it is diagnosed, causes, symptoms, medications, diet, daily weights, exercise and fluid control. Patient's heart failure medications reviewed and information given on each. Reviewed the Stop Light Handout with patient and instructed when to call her provider. Patient/ family were engaged and attentive during education session.     The following handouts were reviewed with patient and patient was given a copy of the following : Heart Failure education booklet, the 'Stop Light' Handout,  a link to the American Heart Association's Healthier Living with Heart

## 2023-08-07 NOTE — PROGRESS NOTES
Physician Progress Note      Aubree Collado  CSN #:                  [id number]  :                       1935  ADMIT DATE:       2023 10:00 PM  [facility] DATE:  RESPONDING  PROVIDER #:        Angy Mendoza MD          QUERY TEXT:    Internal Medicine,    Pt admitted with NSTEMI and has acute heart failure. If possible, please   document in progress notes and discharge summary further specificity regarding   the type and acuity of CHF:    The medical record reflects the following:  Risk Factors: NSTEMI  Clinical Indicators: Per Internal Medicine progress note documentation: \"Acute   Heart Failure w/ concern for BiV failure and inferoseptal RWMA ProBNP 31k -2D   echo 2020 through show EF of 55%. Repeat on this admission show EF of   40 to 45% with apical hypokinesis CTA chest with enlarged RV and PA trunk,   contrast extravasation in hepatic veins  Treatment: labs, imaging, Cardiology consults, ECHO, cardiac cath, IV Bumex,   I&O, supportive care,    Thank you,  Preethi Kurtz RN CDS  [phone]  Options provided:  -- Acute Systolic CHF/HFrEF  -- Acute Diastolic CHF/HFpEF  -- Acute Systolic and Diastolic CHF  -- Other - I will add my own diagnosis  -- Disagree - Not applicable / Not valid  -- Disagree - Clinically unable to determine / Unknown  -- Refer to Clinical Documentation Reviewer    PROVIDER RESPONSE TEXT:    This patient is in acute systolic CHF/HFrEF.     Query created by: Preethi Kurtz on 2023 11:48 AM      Electronically signed by:  Angy Mendoza MD 2023 11:55 AM

## 2023-08-07 NOTE — CARE COORDINATION
.CM met with pt for d/c planning. Introduced self and updated white board. Discussed the OT recommendation for SNF. Pt is agreeable. SNF list offered. Pt states that she wants to go to [facility] d/t she lives there. Discussed Swing bed vs SNF with pt. Pt is agreeable to go to a Swing Bed. Her #2 choice is [facility]. Pt requested that CM call her daughter Jay Sahni to verify she is agreeable. CM called daughter/Annmarie and she is agreeable with the plan for Swing Bed or [facility]. Referral made to Rubi/Swing Bed Coordinator via confidential VM. TE     08/07/23 8846   Service Assessment   Patient Orientation Alert and Oriented   Cognition Alert   History Provided By Patient   Primary [address] is: Patient Declined (Legal Next of Kin Remains as Decision Maker)   PCP Verified by CM Yes   Prior Functional Level Assistance with the following:;Bathing; Shopping;Housework   Current Functional Level Assistance with the following:;Bathing;Dressing; Toileting;Mobility   Can patient return to prior living arrangement Unknown at present   Ability to make needs known: Good   Family able to assist with home care needs: Yes   Would you like for me to discuss the discharge plan with any other family members/significant others, and if so, who? Yes  (DAUGHTER/ANNMARIE)   Financial Resources Medicaid; Medicare   Community Resources None   Social/Functional History   Lives With Alone   Type of Home Apartment   Home Layout One level   Home Equipment Walker Fredertammy Help From [address] Needs assistance   Active  No   Patient's  Info DAUGHTERS PROVIDE HER TRANSPORTATION   Mode of Transportation Car   Discharge Planning   Type of Residence [address] [place] [address] Prior To Admission None   Potential Assistance Needed [address]   Patient expects to be discharged to: Swing bed unit   Services At/After Discharge   Transition of Care Consult (CM Consult) SNF   Partner SNF Yes  (SWING BED)   Services At/After Discharge [facility] (SNF)   Condition of Participation: Discharge Planning   The Patient and/or Patient Representative was provided with a Choice of Provider? Patient   The Patient and/Or Patient Representative agree with the Discharge Plan? Yes   Freedom of Choice list was provided with basic dialogue that supports the patient's individualized plan of care/goals, treatment preferences, and shares the quality data associated with the providers?   Yes

## 2023-08-07 NOTE — PROGRESS NOTES
Cardiac cath done this morning showed no CAD  Elevated troponin and abnormal echo probably secondary to apical ballooning i.e. Takotsubo syndrome  We will continue with present medical therapy and will follow-up as needed

## 2023-08-07 NOTE — PROGRESS NOTES
4 Eyes Skin Assessment     NAME:  Ana Victor  YOB: 1935  MEDICAL RECORD NUMBER:  [id number]    The patient is being assessed for  Cath Lab Post-Op    I agree that at least one RN has performed a thorough Head to Toe Skin Assessment on the patient. ALL assessment sites listed below have been assessed. Areas assessed by both nurses:    Head, Face, Ears, Shoulders, Back, Chest, Arms, Elbows, Hands, Sacrum. Buttock, Coccyx, Ischium, Legs. Feet and Heels, and Under Medical Devices         Does the Patient have a Wound?  No noted wound(s)       Grady Prevention initiated by RN: No  Wound Care Orders initiated by RN: No    Pressure Injury (Stage 3,4, Unstageable, DTI, NWPT, and Complex wounds) if present, place Wound referral order by RN under : No    New Ostomies, if present place, Ostomy referral order under : No     Nurse 1 eSignature: Electronically signed by RENETTA Samuel RN on 8/7/23 at 8:46 AM EDT    **SHARE this note so that the co-signing nurse can place an eSignature**    Nurse 2 eSignature: Electronically signed by Mehnaz Nieto RN on 8/7/23 at 3:21 PM EDT

## 2023-08-07 NOTE — PROGRESS NOTES
V2.0    [facility] Progress Note      Name:  Ramírez Pritchett /Age/Sex: 1935  (82 y.o. female)   MRN & CSN:  [id number] & [id number] Encounter Date/Time: 2023 1:54 PM EDT   Location:  [address] [place] [address] PCP: David Garcia MD     Attending:Spencer Cool MD       Hospital Day: 4    Assessment and Recommendations   Ramírez Pritchett is a 82 y.o. female  who presents with NSTEMI (non-ST elevated myocardial infarction) ([address])      1. Syncope and collapse  Recurrent Lightheadedness  Likely secondary to underlying heart failure and NSTEMI, multiple recent ED visits and hospitalization, concern was for vasovagal episode  Fall precautions  Rest of the management as below     2. NSTEMI  0.646>0.548  EKG with SR, 76/min, Qtc 571ms, LBBB which appears new from prior EKG  CXR w/o acute abnormality  Was started on IV Heparin drip and discontinued  Status post cardiac cath  which showed no CAD but apical ballooning noted  Continue aspirin and Plavix  Cardiology following      3. Acute Heart Failure   ProBNP 31k  -2D echo 2020 through show EF of 55%. Repeat on this admission show EF of 40 to 45% with apical hypokinesis  CTA chest with enlarged RV and PA trunk, contrast extravasation in hepatic veins  Apical ballooning noted on cardiac cath  IV Bumex 2mg BID   I/O and measure daily weight   Cardiology consulted    4. Mild Hyponatremia   Secondary to volume overload   129 on admission   Diuresis as above  Recheck in am     5. Paroxysmal A-Fib  Long term use of Coumadin  INR 1.6  Hold flecainide and Lopressor  Hold Coumadin     Inpatient, stepdown with telemetry  [facility]      Diet ADULT ORAL NUTRITION SUPPLEMENT; Breakfast, Lunch, Dinner; Standard High Calorie/High Protein Oral Supplement  ADULT DIET;  Regular   DVT Prophylaxis [] Lovenox, []  Heparin, [] SCDs, [] Ambulation,  [] Eliquis, [] Xarelto  [] Coumadin   Code Status DNR-CCA   Disposition From: Home  Expected Disposition: TBD  Estimated Date of Discharge:

## 2023-08-07 NOTE — PROGRESS NOTES
Physician Progress Note      Juvencio Ford  [facility] #:                  [id number]  :                       1935  ADMIT DATE:       2023 10:00 PM  [facility] DATE:  RESPONDING  PROVIDER #:        Demetri Leiva MD          QUERY TEXT:    Internal Medicine,    Pt admitted with CHF and NSTEMI. If possible, please document in progress   notes and discharge summary the etiology of CHF, if able to be determined. The medical record reflects the following:  Risk Factors: HTN  Clinical Indicators: Per Cardiology documentation: \"Cardiac cath done this   morning showed no CAD Elevated troponin and abnormal echo probably secondary   to apical ballooning i.e. Takotsubo syndrome  Treatment: labs, imaging, Cardiology consult, ECHO, cardiac cath, medical   management    Thank you,  Ivan Hicks RN CDS  [phone]  Options provided:  -- CHF due to Hypertensive Heart Disease  -- CHF due to Takotsubo syndrome  -- Other - I will add my own diagnosis  -- Disagree - Not applicable / Not valid  -- Disagree - Clinically unable to determine / Unknown  -- Refer to Clinical Documentation Reviewer    PROVIDER RESPONSE TEXT:    This patient has CHF due to Takotsubo syndrome.     Query created by: Ivan Hicks on 2023 1:12 PM      Electronically signed by:  Demetri Leiva MD 2023 2:17 PM

## 2023-08-07 NOTE — PROGRESS NOTES
Occupational Therapy    [facility] ACUTE CARE OCCUPATIONAL THERAPY EVALUATION  [address], 1935, 3118/3118-A, 8/7/2023    History  [place]:  There were no encounter diagnoses. Patient  has a past medical history of Aortic stenosis, mild, Arthritis, Diverticulitis, Easy bruising, H/O cardiovascular stress test, H/O Doppler echocardiogram, H/O echocardiogram, Heart murmur, History of Holter monitoring, Hyperlipidemia, Hypertension, Leg cramps, PAF (paroxysmal atrial fibrillation) ([address]), Syncope and collapse, and Thyroid disease. Patient  has a past surgical history that includes Cholecystectomy; Thyroid surgery; Abdomen surgery; Colonoscopy; joint replacement; joint replacement; Eye surgery (06/2018); Cataract removal with implant (Right, 06/14/2019); Intracapsular cataract extraction (Right, 6/14/2019); Cataract removal with implant (Left, 06/28/2019); and Intracapsular cataract extraction (Left, 6/28/2019). Subjective:  Patient states:  \"I'm so tired from getting up\".     Pain:  No.    Communication with other providers:  Handoff to RN  Restrictions: General Precautions, Fall Risk    Home Setup/Prior level of function  Social/Functional History  Lives With: Alone  Type of Home: Apartment  Home Layout: One level  Home Access: Level entry  Bathroom Shower/Tub: Walk-in shower  Bathroom Toilet: Standard  Bathroom Equipment: Shower chair  Bathroom Accessibility: Accessible  Home Equipment: Walker, rolling  Has the patient had two or more falls in the past year or any fall with injury in the past year?: Yes (5 recent falls due to dizziness/lightheadedness)  ADL Assistance: Independent  Homemaking Assistance: Needs assistance  Ambulation Assistance: Independent  Transfer Assistance: Independent  Active : No  Patient's  Info: Daughters  Mode of Transportation: Car    Examination of body systems (includes body structures/functions, activity/participation limitations):  Observation: PM,8/7/2023

## 2023-08-07 NOTE — PROGRESS NOTES
[facility] PHYSICAL REHABILITATION [address], [address]  Phone: [phone]    Fax [phone]                  Josiane Lai MD, Martin Grover MD, Joslyn Butt MD, MD Thi Wheeler MD Babs Agar, MD Baldo Robinson, MD Dr. Heddy Richmond MD Coreen Schultze, GAUDENCIO Servin, APRN Arn Kawasaki, APRN    Joyce Wilkinson, APRN  Claire Fabry, PAMariliaC    CARDIOLOGY  NOTE      Name:  Audrey Cordova /Age/Sex: 1935  (82 y.o. female)   MRN & CSN:  [id number] & [id number] Admission Date/Time: 2023 10:00 PM   Location:  [address] [place] [address]- PCP: Rhonda Fuchs MD       Hospital Day: 4  NSTEMI  - Cardiology consult is for: NSTEMI      ASSESSMENT/ PLAN:  NSTEMI secondary to below  Stress-induced cardiomyopathy  Acute heart failure with mildly reduced ejection fraction  Valvular heart disease  -Strict I's and O's and daily weights  -Echo showing EF of 40 to 45% and grade 2 diastolic dysfunction, as well as moderate mitral regurgitation, mild mitral stenosis and aortic stenosis  -Guideline directed medical therapy:  Initiated on Toprol-XL 25 mg daily. Will consider adding ACE/ARB and Aldactone if blood pressure tolerates at later date.  -On Lasix milligrams p.o. daily  Paroxysmal atrial fibrillation  -Currently normal sinus rhythm  -Rate control strategy at this time. On metoprolol  -Discontinue flecainide due to above  Hypothyroidism  -Check TSH/T4  -On synthroid        [facility] :   Access : Radial Indication : abnormal troponin   1. Left dominant system with dominant Circ   2. LAD and Circ are widely patent, Small non dominant RCA   LVEDP was 13     Echo 2023   Left ventricular function is mildly abnormal, EF is estimated at 40-45%. Hypokineses of the Apical inferior segment of the left ventricle. Mild left ventricular hypertrophy. Grade II diastolic dysfunction.    Moderately dilated left

## 2023-08-07 NOTE — PROGRESS NOTES
08/07/23 1609   Encounter Summary   Encounter Overview/Reason  Initial Encounter   Service Provided For: Patient   Referral/Consult From: [facility]   Support System Children   Last Encounter  08/07/23   Complexity of Encounter Low   Begin Time 1140   End Time  1150   Total Time Calculated 10 min   Encounter    Type Initial Screen/Assessment   Spiritual/Emotional needs   Type Spiritual Support   Assessment/Intervention/Outcome   Assessment Coping   Intervention Sustaining Presence/Ministry of presence   Outcome Expressed Gratitude   Plan and Referrals   Plan/Referrals No future visits requested

## 2023-08-07 NOTE — PROGRESS NOTES
Attempt to remove compression band from right arm started d to bleed12 ml of air replaced rational given to pt and family  1100 Radial compression band removed manual pressure held for 3 minutes. No problems noted. Transparent drsg applied.  No edema noted sight proximal to wrist above site darke purple

## 2023-08-08 LAB
ANION GAP SERPL CALCULATED.3IONS-SCNC: 9 MMOL/L (ref 4–16)
ANTI-XA UNFRAC HEPARIN: 0.2 IU/ML (ref 0.3–0.7)
BUN SERPL-MCNC: 23 MG/DL (ref 6–23)
CALCIUM SERPL-MCNC: 8.8 MG/DL (ref 8.3–10.6)
CHLORIDE BLD-SCNC: 93 MMOL/L (ref 99–110)
CO2: 25 MMOL/L (ref 21–32)
CREAT SERPL-MCNC: 0.9 MG/DL (ref 0.6–1.1)
GFR SERPL CREATININE-BSD FRML MDRD: >60 ML/MIN/1.73M2
GLUCOSE SERPL-MCNC: 111 MG/DL (ref 70–99)
INR BLD: 1.2 INDEX
MAGNESIUM: 2.1 MG/DL (ref 1.8–2.4)
POTASSIUM SERPL-SCNC: 4.2 MMOL/L (ref 3.5–5.1)
PROTHROMBIN TIME: 14.9 SECONDS (ref 11.7–14.5)
SODIUM BLD-SCNC: 127 MMOL/L (ref 135–145)

## 2023-08-08 PROCEDURE — APPSS30 APP SPLIT SHARED TIME 16-30 MINUTES

## 2023-08-08 PROCEDURE — 1200000000 HC SEMI PRIVATE

## 2023-08-08 PROCEDURE — 6370000000 HC RX 637 (ALT 250 FOR IP): Performed by: STUDENT IN AN ORGANIZED HEALTH CARE EDUCATION/TRAINING PROGRAM

## 2023-08-08 PROCEDURE — 83735 ASSAY OF MAGNESIUM: CPT

## 2023-08-08 PROCEDURE — 6360000002 HC RX W HCPCS: Performed by: INTERNAL MEDICINE

## 2023-08-08 PROCEDURE — 6370000000 HC RX 637 (ALT 250 FOR IP)

## 2023-08-08 PROCEDURE — 80048 BASIC METABOLIC PNL TOTAL CA: CPT

## 2023-08-08 PROCEDURE — 6370000000 HC RX 637 (ALT 250 FOR IP): Performed by: INTERNAL MEDICINE

## 2023-08-08 PROCEDURE — 97116 GAIT TRAINING THERAPY: CPT

## 2023-08-08 PROCEDURE — 94761 N-INVAS EAR/PLS OXIMETRY MLT: CPT

## 2023-08-08 PROCEDURE — 99232 SBSQ HOSP IP/OBS MODERATE 35: CPT | Performed by: INTERNAL MEDICINE

## 2023-08-08 PROCEDURE — 85610 PROTHROMBIN TIME: CPT

## 2023-08-08 PROCEDURE — 36415 COLL VENOUS BLD VENIPUNCTURE: CPT

## 2023-08-08 PROCEDURE — 97162 PT EVAL MOD COMPLEX 30 MIN: CPT

## 2023-08-08 RX ORDER — WARFARIN SODIUM 2 MG/1
4 TABLET ORAL EVERY EVENING
Status: DISCONTINUED | OUTPATIENT
Start: 2023-08-08 | End: 2023-08-10 | Stop reason: HOSPADM

## 2023-08-08 RX ADMIN — MICONAZOLE NITRATE: 2 POWDER TOPICAL at 09:07

## 2023-08-08 RX ADMIN — MICONAZOLE NITRATE: 2 POWDER TOPICAL at 21:18

## 2023-08-08 RX ADMIN — LEVOTHYROXINE SODIUM 75 MCG: 0.07 TABLET ORAL at 05:41

## 2023-08-08 RX ADMIN — METOPROLOL SUCCINATE 25 MG: 25 TABLET, FILM COATED, EXTENDED RELEASE ORAL at 09:08

## 2023-08-08 RX ADMIN — WARFARIN SODIUM 4 MG: 2 TABLET ORAL at 17:03

## 2023-08-08 RX ADMIN — ENOXAPARIN SODIUM 40 MG: 100 INJECTION SUBCUTANEOUS at 09:09

## 2023-08-08 RX ADMIN — POLYETHYLENE GLYCOL (3350) 17 G: 17 POWDER, FOR SOLUTION ORAL at 09:20

## 2023-08-08 RX ADMIN — FUROSEMIDE 20 MG: 20 TABLET ORAL at 09:08

## 2023-08-08 RX ADMIN — SACUBITRIL AND VALSARTAN 1 TABLET: 24; 26 TABLET, FILM COATED ORAL at 21:19

## 2023-08-08 ASSESSMENT — PAIN SCALES - GENERAL
PAINLEVEL_OUTOF10: 0
PAINLEVEL_OUTOF10: 0

## 2023-08-08 NOTE — PROGRESS NOTES
V2.0    [facility] Progress Note      Name:  Heather Genao /Age/Sex: 1935  (82 y.o. female)   MRN & CSN:  [id number] & [id number] Encounter Date/Time: 2023 1:54 PM EDT   Location:  [address] [place] [address] PCP: Emiliano Uribe MD     Attending:Spencer Rascon MD       Hospital Day: 5    Assessment and Recommendations   Heather Genao is a 82 y.o. female  who presents with NSTEMI (non-ST elevated myocardial infarction) ([address])      1. Syncope and collapse  Recurrent Lightheadedness  Etiology multifactorial  Fall precautions  PT OT     2. NSTEMI  0.646>0.548  EKG with SR, 76/min, Qtc 571ms, LBBB which appears new from prior EKG  CXR w/o acute abnormality  Was started on IV Heparin drip and discontinued  Underwent cardiac cath  which showed no CAD but apical ballooning noted  Continue aspirin and Plavix  Cardiology following      3. Acute Heart Failure   ProBNP 31k  -2D echo 2020 through show EF of 55%. Repeat on this admission show EF of 40 to 45% with apical hypokinesis  CTA chest with enlarged RV and PA trunk, contrast extravasation in hepatic veins  Apical ballooning noted on cardiac cath  Was on IV Bumex 2mg BID. Started on Lasix 20 mg daily  I/O and measure daily weight   Cardiology consulted    4. Chronic mild Hyponatremia   Secondary to volume overload   129 on admission- >128>130>127  fluid restriction  Diuretics de-escalated to Lasix 20 mg daily       5. Paroxysmal A-Fib  Cardiology discontinue flecainide. Continue metoprolol  Resume Coumadin     Inpatient, stepdown with telemetry  [facility]      Diet ADULT ORAL NUTRITION SUPPLEMENT; Breakfast, Lunch, Dinner; Standard High Calorie/High Protein Oral Supplement  ADULT DIET;  Regular   DVT Prophylaxis [] Lovenox, []  Heparin, [] SCDs, [] Ambulation,  [] Eliquis, [] Xarelto  [] Coumadin   Code Status DNR-CCA   Disposition From: Home  Expected Disposition: TBD  Estimated Date of Discharge: TBD  Patient requires continued admission due to placement to swing CREATININE 1.1 1.1 0.9   GLUCOSE 106* 111* 111*     Hepatic:   No results for input(s): AST, ALT, ALB, BILITOT, ALKPHOS in the last 72 hours. Lipids:   Lab Results   Component Value Date/Time    CHOL 120 08/05/2013 09:06 AM    HDL 44 08/05/2013 09:06 AM    TRIG 167 08/05/2013 09:06 AM     Hemoglobin A1C: No results found for: LABA1C  TSH: No results found for: TSH  Troponin:   Lab Results   Component Value Date/Time    TROPONINT 0.667 08/05/2023 12:14 AM    TROPONINT 0.548 08/04/2023 05:00 PM    TROPONINT 0.646 08/04/2023 03:13 PM     Lactic Acid: No results for input(s): LACTA in the last 72 hours. BNP:   No results for input(s): PROBNP in the last 72 hours.     UA:  Lab Results   Component Value Date/Time    NITRU POSITIVE 08/07/2023 02:00 PM    COLORU BROWN 08/07/2023 02:00 PM    WBCUA 128 08/07/2023 02:00 PM    RBCUA 1,603 08/07/2023 02:00 PM    MUCUS RARE 08/07/2023 02:00 PM    TRICHOMONAS NONE SEEN 08/07/2023 02:00 PM    BACTERIA NEGATIVE 08/07/2023 02:00 PM    CLARITYU SLIGHTLY CLOUDY 08/07/2023 02:00 PM    SPECGRAV 1.010 08/07/2023 02:00 PM    LEUKOCYTESUR TRACE 08/07/2023 02:00 PM    UROBILINOGEN 4.0 08/07/2023 02:00 PM    BILIRUBINUR NEGATIVE 08/07/2023 02:00 PM    BLOODU LARGE NUMBER OR AMOUNT OBSERVED 08/07/2023 02:00 PM    [facility] NEGATIVE 08/07/2023 02:00 PM     Urine Cultures: No results found for: LABURIN  Blood Cultures: No results found for: BC  No results found for: BLOODCULT2  Organism: No results found for: [facility]      Electronically signed by Pearl Gutierrez MD on 8/8/2023 at 12:28 PM

## 2023-08-08 NOTE — CARE COORDINATION
House Supervisor at [address] [place] [address] notified CM that the provider will not be able to make a decision to accept until Dr John Mayfield has a note in stating that pt is medically ready or until he calls the Swing Bed provider when pt is ready for d/c.  TE [phone]

## 2023-08-08 NOTE — PROGRESS NOTES
Physical Therapy  [facility] ACUTE CARE PHYSICAL THERAPY EVALUATION  [address], 1935, 3118/3118-A, 8/8/2023    History  [place]:  There were no encounter diagnoses. Patient  has a past medical history of Aortic stenosis, mild, Arthritis, Diverticulitis, Easy bruising, H/O cardiovascular stress test, H/O Doppler echocardiogram, H/O echocardiogram, Heart murmur, History of Holter monitoring, Hyperlipidemia, Hypertension, Leg cramps, PAF (paroxysmal atrial fibrillation) ([address]), Syncope and collapse, and Thyroid disease. Patient  has a past surgical history that includes Cholecystectomy; Thyroid surgery; Abdomen surgery; Colonoscopy; joint replacement; joint replacement; Eye surgery (06/2018); Cataract removal with implant (Right, 06/14/2019); Intracapsular cataract extraction (Right, 6/14/2019); Cataract removal with implant (Left, 06/28/2019); and Intracapsular cataract extraction (Left, 6/28/2019).     Subjective:  Patient states:  \"I need moved up in the bed\"    Pain:  denies    Restrictions: general precautions, falls     Home Setup/Prior level of function  Social/Functional History  Lives With: Alone  Type of Home: Apartment  Home Layout: One level  Home Access: Level entry  Bathroom Shower/Tub: Walk-in shower  Bathroom Toilet: Standard  Bathroom Equipment: Shower chair  Bathroom Accessibility: Accessible  Home Equipment: Walker, rolling  Has the patient had two or more falls in the past year or any fall with injury in the past year?: Yes (5 recent falls due to dizziness/lightheadedness)  Receives Help From: Family  ADL Assistance: Independent  Homemaking Assistance: Needs assistance  Ambulation Assistance:  ([place] with RW)  Transfer Assistance: Independent  Active : No  Patient's  Info: daughter  Mode of Transportation: Car    Examination of body systems (includes body structures/functions, activity/participation limitations):  Observation:  Supine in bed upon arrival. Cooperative

## 2023-08-08 NOTE — PROGRESS NOTES
Pt woke up and was allowed to it by the edge of the bed, with call light in reach, about 15 mins after, Pt removed tele wires and extended dwell catheter that was just initiated from the beginning of the shift. Placed another IV consult and reoriented the patient. Care on-going.     Electronically signed by Lorena Escobar RN on 8/8/2023 at 3:40 AM

## 2023-08-08 NOTE — CONSULTS
Consult completed. Procedure/rationale explained to patient & consent obtained. #18ga Introcan Safety Deep Access EDPIV initiated to LUE Brachial Vein using sterile, UltraSound-guided technique without difficulty/complications. Positioning verified via UltraSound visualization of catheter within vessel lumen; site returns blood briskly and flushes without resistance/abnormalities. Sterile dressing with SkinPrep, CHG gel DSSG, SwabCap, and Limb Precautions band applied. Pt tolerated well & no other c/o or needs noted or reported. Primary RN notified.

## 2023-08-08 NOTE — PROGRESS NOTES
PHARMACY ANTICOAGULATION MONITORING SERVICE    Lida Jain is a 82 y.o. female on warfarin therapy for AFib. Pharmacy consulted by Dr. Sandra Escoto for monitoring and adjustment of treatment. Indication for anticoagulation: Afib  INR goal: 2-3  Warfarin dose prior to admission: alternating 4mg and 5mg every other day     Pertinent Laboratory Values   Recent Labs     08/07/23  1211   HGB 12.1*   HCT 37.0          Assessment/Plan:  Drug Interactions: Enoxaparin   INR 1.6 on 8/4/23  Will continue warfarin 4mg daily   Pharmacy will continue to monitor and adjust warfarin therapy as indicated    Thank you for the consult.   Rosey Willis, [facility]  8/8/2023 3:32 PM

## 2023-08-08 NOTE — CONSULTS
Consult completed. Patient has PRN only medications. EDC placed at the start of shift and was pulled out. Not placing PIV at this time.

## 2023-08-08 NOTE — CARE COORDINATION
Called [facility] supervisor/Heather for Swing Bed decision. Informed her that pt is medically ready for d/c per  in [address]. She will have the provider review clinicals and will notify CM of decision to accept or not. Pt will require a pre-cert if accepted.   SHERWIN

## 2023-08-09 ENCOUNTER — APPOINTMENT (OUTPATIENT)
Dept: GENERAL RADIOLOGY | Age: 88
DRG: 280 | End: 2023-08-09
Attending: STUDENT IN AN ORGANIZED HEALTH CARE EDUCATION/TRAINING PROGRAM
Payer: MEDICARE

## 2023-08-09 LAB
ANION GAP SERPL CALCULATED.3IONS-SCNC: 10 MMOL/L (ref 4–16)
BUN SERPL-MCNC: 23 MG/DL (ref 6–23)
CALCIUM SERPL-MCNC: 8.4 MG/DL (ref 8.3–10.6)
CHLORIDE BLD-SCNC: 96 MMOL/L (ref 99–110)
CO2: 25 MMOL/L (ref 21–32)
CREAT SERPL-MCNC: 0.8 MG/DL (ref 0.6–1.1)
EKG ATRIAL RATE: 72 BPM
EKG DIAGNOSIS: NORMAL
EKG P AXIS: 55 DEGREES
EKG P-R INTERVAL: 186 MS
EKG Q-T INTERVAL: 452 MS
EKG QRS DURATION: 86 MS
EKG QTC CALCULATION (BAZETT): 494 MS
EKG R AXIS: -5 DEGREES
EKG T AXIS: 16 DEGREES
EKG VENTRICULAR RATE: 72 BPM
GFR SERPL CREATININE-BSD FRML MDRD: >60 ML/MIN/1.73M2
GLUCOSE SERPL-MCNC: 115 MG/DL (ref 70–99)
HCT VFR BLD CALC: 40.9 % (ref 37–47)
HEMOGLOBIN: 13.1 GM/DL (ref 12.5–16)
INR BLD: 1.2 INDEX
LACTATE: 2 MMOL/L (ref 0.5–1.9)
MAGNESIUM: 1.9 MG/DL (ref 1.8–2.4)
MCH RBC QN AUTO: 30.4 PG (ref 27–31)
MCHC RBC AUTO-ENTMCNC: 32 % (ref 32–36)
MCV RBC AUTO: 94.9 FL (ref 78–100)
PDW BLD-RTO: 14.6 % (ref 11.7–14.9)
PLATELET # BLD: 350 K/CU MM (ref 140–440)
PMV BLD AUTO: 9.6 FL (ref 7.5–11.1)
POTASSIUM SERPL-SCNC: 4.5 MMOL/L (ref 3.5–5.1)
PROTHROMBIN TIME: 15.3 SECONDS (ref 11.7–14.5)
RBC # BLD: 4.31 M/CU MM (ref 4.2–5.4)
SODIUM BLD-SCNC: 131 MMOL/L (ref 135–145)
WBC # BLD: 11.9 K/CU MM (ref 4–10.5)

## 2023-08-09 PROCEDURE — 6370000000 HC RX 637 (ALT 250 FOR IP): Performed by: INTERNAL MEDICINE

## 2023-08-09 PROCEDURE — 6370000000 HC RX 637 (ALT 250 FOR IP)

## 2023-08-09 PROCEDURE — APPSS30 APP SPLIT SHARED TIME 16-30 MINUTES

## 2023-08-09 PROCEDURE — 6360000002 HC RX W HCPCS: Performed by: NURSE PRACTITIONER

## 2023-08-09 PROCEDURE — 36415 COLL VENOUS BLD VENIPUNCTURE: CPT

## 2023-08-09 PROCEDURE — 99232 SBSQ HOSP IP/OBS MODERATE 35: CPT | Performed by: INTERNAL MEDICINE

## 2023-08-09 PROCEDURE — 94761 N-INVAS EAR/PLS OXIMETRY MLT: CPT

## 2023-08-09 PROCEDURE — 74018 RADEX ABDOMEN 1 VIEW: CPT

## 2023-08-09 PROCEDURE — 83735 ASSAY OF MAGNESIUM: CPT

## 2023-08-09 PROCEDURE — 93005 ELECTROCARDIOGRAM TRACING: CPT

## 2023-08-09 PROCEDURE — 80048 BASIC METABOLIC PNL TOTAL CA: CPT

## 2023-08-09 PROCEDURE — 1200000000 HC SEMI PRIVATE

## 2023-08-09 PROCEDURE — 2580000003 HC RX 258: Performed by: STUDENT IN AN ORGANIZED HEALTH CARE EDUCATION/TRAINING PROGRAM

## 2023-08-09 PROCEDURE — 93010 ELECTROCARDIOGRAM REPORT: CPT | Performed by: INTERNAL MEDICINE

## 2023-08-09 PROCEDURE — 6360000002 HC RX W HCPCS: Performed by: INTERNAL MEDICINE

## 2023-08-09 PROCEDURE — 6370000000 HC RX 637 (ALT 250 FOR IP): Performed by: STUDENT IN AN ORGANIZED HEALTH CARE EDUCATION/TRAINING PROGRAM

## 2023-08-09 PROCEDURE — 85610 PROTHROMBIN TIME: CPT

## 2023-08-09 PROCEDURE — 85027 COMPLETE CBC AUTOMATED: CPT

## 2023-08-09 PROCEDURE — 83605 ASSAY OF LACTIC ACID: CPT

## 2023-08-09 PROCEDURE — 2580000003 HC RX 258

## 2023-08-09 RX ORDER — SODIUM CHLORIDE 0.9 % (FLUSH) 0.9 %
5-40 SYRINGE (ML) INJECTION PRN
Status: CANCELLED | OUTPATIENT
Start: 2023-08-09

## 2023-08-09 RX ORDER — MIDODRINE HYDROCHLORIDE 5 MG/1
5 TABLET ORAL
Status: DISCONTINUED | OUTPATIENT
Start: 2023-08-09 | End: 2023-08-10 | Stop reason: HOSPADM

## 2023-08-09 RX ORDER — POLYETHYLENE GLYCOL 3350 17 G/17G
17 POWDER, FOR SOLUTION ORAL DAILY
Status: CANCELLED | OUTPATIENT
Start: 2023-08-10

## 2023-08-09 RX ORDER — SODIUM CHLORIDE 9 MG/ML
INJECTION, SOLUTION INTRAVENOUS PRN
Status: CANCELLED | OUTPATIENT
Start: 2023-08-09

## 2023-08-09 RX ORDER — POLYETHYLENE GLYCOL 3350 17 G/17G
17 POWDER, FOR SOLUTION ORAL DAILY
Status: DISCONTINUED | OUTPATIENT
Start: 2023-08-09 | End: 2023-08-10 | Stop reason: HOSPADM

## 2023-08-09 RX ORDER — SODIUM CHLORIDE 0.9 % (FLUSH) 0.9 %
5-40 SYRINGE (ML) INJECTION EVERY 12 HOURS SCHEDULED
Status: CANCELLED | OUTPATIENT
Start: 2023-08-09

## 2023-08-09 RX ORDER — FUROSEMIDE 20 MG/1
20 TABLET ORAL DAILY
Status: CANCELLED | OUTPATIENT
Start: 2023-08-10

## 2023-08-09 RX ORDER — PROCHLORPERAZINE EDISYLATE 5 MG/ML
5 INJECTION INTRAMUSCULAR; INTRAVENOUS EVERY 6 HOURS PRN
Status: CANCELLED | OUTPATIENT
Start: 2023-08-09

## 2023-08-09 RX ORDER — CALCIUM CARBONATE 500 MG/1
500 TABLET, CHEWABLE ORAL 3 TIMES DAILY PRN
Status: CANCELLED | OUTPATIENT
Start: 2023-08-09

## 2023-08-09 RX ORDER — LEVOTHYROXINE SODIUM 0.07 MG/1
75 TABLET ORAL DAILY
Status: CANCELLED | OUTPATIENT
Start: 2023-08-10

## 2023-08-09 RX ORDER — ACETAMINOPHEN 325 MG/1
650 TABLET ORAL EVERY 6 HOURS PRN
Status: CANCELLED | OUTPATIENT
Start: 2023-08-09

## 2023-08-09 RX ORDER — POLYETHYLENE GLYCOL 3350 17 G/17G
17 POWDER, FOR SOLUTION ORAL DAILY PRN
Status: CANCELLED | OUTPATIENT
Start: 2023-08-09

## 2023-08-09 RX ORDER — MAGNESIUM SULFATE IN WATER 40 MG/ML
2000 INJECTION, SOLUTION INTRAVENOUS PRN
Status: CANCELLED | OUTPATIENT
Start: 2023-08-09

## 2023-08-09 RX ORDER — WARFARIN SODIUM 4 MG/1
4 TABLET ORAL EVERY EVENING
Status: CANCELLED | OUTPATIENT
Start: 2023-08-09

## 2023-08-09 RX ORDER — ONDANSETRON 2 MG/ML
4 INJECTION INTRAMUSCULAR; INTRAVENOUS EVERY 6 HOURS PRN
Status: DISCONTINUED | OUTPATIENT
Start: 2023-08-09 | End: 2023-08-09

## 2023-08-09 RX ORDER — METOPROLOL SUCCINATE 25 MG/1
25 TABLET, EXTENDED RELEASE ORAL DAILY
Status: CANCELLED | OUTPATIENT
Start: 2023-08-10

## 2023-08-09 RX ORDER — POTASSIUM CHLORIDE 7.45 MG/ML
10 INJECTION INTRAVENOUS PRN
Status: CANCELLED | OUTPATIENT
Start: 2023-08-09

## 2023-08-09 RX ORDER — 0.9 % SODIUM CHLORIDE 0.9 %
500 INTRAVENOUS SOLUTION INTRAVENOUS ONCE
Status: COMPLETED | OUTPATIENT
Start: 2023-08-09 | End: 2023-08-09

## 2023-08-09 RX ORDER — GUAIFENESIN/DEXTROMETHORPHAN 100-10MG/5
5 SYRUP ORAL EVERY 4 HOURS PRN
Status: CANCELLED | OUTPATIENT
Start: 2023-08-09

## 2023-08-09 RX ORDER — ACETAMINOPHEN 650 MG/1
650 SUPPOSITORY RECTAL EVERY 6 HOURS PRN
Status: CANCELLED | OUTPATIENT
Start: 2023-08-09

## 2023-08-09 RX ORDER — MIDODRINE HYDROCHLORIDE 5 MG/1
5 TABLET ORAL
Status: CANCELLED | OUTPATIENT
Start: 2023-08-09

## 2023-08-09 RX ORDER — POTASSIUM CHLORIDE 20 MEQ/1
40 TABLET, EXTENDED RELEASE ORAL PRN
Status: CANCELLED | OUTPATIENT
Start: 2023-08-09

## 2023-08-09 RX ORDER — ENOXAPARIN SODIUM 100 MG/ML
40 INJECTION SUBCUTANEOUS DAILY
Status: CANCELLED | OUTPATIENT
Start: 2023-08-10

## 2023-08-09 RX ORDER — ACETAMINOPHEN 325 MG/1
650 TABLET ORAL EVERY 4 HOURS PRN
Status: CANCELLED | OUTPATIENT
Start: 2023-08-09

## 2023-08-09 RX ORDER — MORPHINE SULFATE 2 MG/ML
2 INJECTION, SOLUTION INTRAMUSCULAR; INTRAVENOUS EVERY 4 HOURS PRN
Status: CANCELLED | OUTPATIENT
Start: 2023-08-09

## 2023-08-09 RX ADMIN — FUROSEMIDE 20 MG: 20 TABLET ORAL at 09:39

## 2023-08-09 RX ADMIN — ENOXAPARIN SODIUM 40 MG: 100 INJECTION SUBCUTANEOUS at 09:39

## 2023-08-09 RX ADMIN — PROCHLORPERAZINE EDISYLATE 5 MG: 5 INJECTION, SOLUTION INTRAMUSCULAR; INTRAVENOUS at 17:53

## 2023-08-09 RX ADMIN — MICONAZOLE NITRATE: 2 POWDER TOPICAL at 09:40

## 2023-08-09 RX ADMIN — LEVOTHYROXINE SODIUM 75 MCG: 0.07 TABLET ORAL at 05:37

## 2023-08-09 RX ADMIN — POLYETHYLENE GLYCOL (3350) 17 G: 17 POWDER, FOR SOLUTION ORAL at 15:20

## 2023-08-09 RX ADMIN — MIDODRINE HYDROCHLORIDE 5 MG: 5 TABLET ORAL at 12:03

## 2023-08-09 RX ADMIN — MIDODRINE HYDROCHLORIDE 5 MG: 5 TABLET ORAL at 16:26

## 2023-08-09 RX ADMIN — CALCIUM CARBONATE 500 MG: 500 TABLET, CHEWABLE ORAL at 09:40

## 2023-08-09 RX ADMIN — SODIUM CHLORIDE, PRESERVATIVE FREE 10 ML: 5 INJECTION INTRAVENOUS at 22:29

## 2023-08-09 RX ADMIN — WARFARIN SODIUM 4 MG: 2 TABLET ORAL at 17:53

## 2023-08-09 RX ADMIN — SODIUM CHLORIDE 500 ML: 9 INJECTION, SOLUTION INTRAVENOUS at 11:47

## 2023-08-09 ASSESSMENT — PAIN SCALES - GENERAL: PAINLEVEL_OUTOF10: 0

## 2023-08-09 NOTE — CARE COORDINATION
Pre-cert received for Swing Bed  per CM/MS and is good from 8-9-2023 thru 8-. Next review date 8-. Plan I.D. # [id number]. Notified Dr Barbara Dominguez via PS. Will need discharge -readmit orders. CALL REPORT -[phone].    TE

## 2023-08-09 NOTE — PROGRESS NOTES
Pt came up from xray complaining of light headedness and stating \"I just dont feel well\" . This nurse assessed pt and took vitals bp 65/54 hr 87 MAP 60. Cardio and attending notified. New order in [facility] 500cc NS bolus infusing. Midodrine 5mg po given. Pt in trend position. Lab called this nurse lactic 2. Attending notified no new orders at this time. This nurse at bedside. No other complaints.

## 2023-08-09 NOTE — PROGRESS NOTES
[address], [address] [place] [address]  Phone: [phone]    Fax [phone]                  Swetha Adams MD, Rogerio Reynolds MD, Ritu Smith MD, Tuan Pat MD  Artist Sal, MD Danise Bramble, MD Conda Hazard, MD Dr. Loye Spore MD Zachery Libman, APRN      Brenda Cortes, APRN  Leandro Lanza, APRN    Raphael Nieto, APRN  Jackelyn Kenyon PA-C    CARDIOLOGY  NOTE      Name:  Marcella Tao /Age/Sex: 1935  (82 y.o. female)   MRN & CSN:  [id number] & [id number] Admission Date/Time: 2023 10:00 PM   Location:  [address] [place] [address] PCP: Aston Romero MD       Hospital Day: 6  NSTEMI  - Cardiology consult is for: NSTEMI      ASSESSMENT/ PLAN:  NSTEMI secondary to below  Stress-induced cardiomyopathy  Acute heart failure with mildly reduced ejection fraction  Valvular heart disease  -Strict I's and O's and daily weights. Appears hypovolemic at this time  -Echo showing EF of 40 to 45% and grade 2 diastolic dysfunction, as well as moderate mitral regurgitation, mild mitral stenosis and aortic stenosis  -Guideline directed medical therapy: Continue Toprol-XL 25 mg daily. Discontinue entresto, blood pressure not tolerating. Initiate on midodrine and give small normal saline bolus  -Hold Lasix 20 mg p.o. daily  Paroxysmal atrial fibrillation  -Currently normal sinus rhythm  -Rate control strategy at this time. On metoprolol  -Discontinued flecainide due to above  Hypothyroidism  -TSH/T4 within normal limits  -On synthroid          [facility] :   Access : Radial Indication : abnormal troponin   1. Left dominant system with dominant Circ   2. LAD and Circ are widely patent, Small non dominant RCA   LVEDP was 13     Echo 2023   Left ventricular function is mildly abnormal, EF is estimated at 40-45%. Hypokineses of the Apical inferior segment of the left ventricle.    Mild left ventricular

## 2023-08-09 NOTE — PROGRESS NOTES
PHARMACY ANTICOAGULATION MONITORING SERVICE    Marixa Pavon is a 82 y.o. female on warfarin therapy for AFib. Pharmacy consulted by Dr. Neo Parker for monitoring and adjustment of treatment. Indication for anticoagulation: Afib  INR goal: 2-3  Warfarin dose prior to admission: alternating 4mg and 5mg every other day     Pertinent Laboratory Values   Recent Labs     08/07/23  1211 08/08/23  1756 08/09/23  0435   INR  --    < > 1.2   HGB 12.1*  --   --    HCT 37.0  --   --      --   --     < > = values in this interval not displayed. Assessment/Plan:  Drug Interactions: Enoxaparin   INR 1.2 on 8/4/23  Will continue warfarin 4mg daily   Pharmacy will continue to monitor and adjust warfarin therapy as indicated    Thank you for the consult.   Doug Durham, [facility]  8/9/2023 11:35 AM

## 2023-08-09 NOTE — PROGRESS NOTES
V2.0    [facility] Progress Note      Name:  Rosy Iglesias /Age/Sex: 1935  (82 y.o. female)   MRN & CSN:  [id number] & [id number] Encounter Date/Time: 2023 1:54 PM EDT   Location:  8544/6785-D PCP: Jacob Ha MD     Attending:Spencer Regalado MD       Hospital Day: 6    Assessment and Recommendations   Rosy Iglesias is a 82 y.o. female  who presents with NSTEMI (non-ST elevated myocardial infarction) ([address])      1. Syncope and collapse  Recurrent Lightheadedness  Etiology multifactorial  Fall precautions  PT OT     2. NSTEMI  0.646>0.548  EKG with SR, 76/min, Qtc 571ms, LBBB which appears new from prior EKG  CXR w/o acute abnormality  Was started on IV Heparin drip and discontinued  Underwent cardiac cath  which showed no CAD but apical ballooning noted  Continue aspirin and Plavix  Cardiology was consulted and following     3. Acute Heart Failure   ProBNP 31k  -2D echo 2020 through show EF of 55%. Repeat on this admission show EF of 40 to 45% with apical hypokinesis  CTA chest with enlarged RV and PA trunk, contrast extravasation in hepatic veins  Apical ballooning noted on cardiac cath  Was on IV Bumex 2mg BID. Started on Lasix 20 mg daily. Lopressor and Entresto. Entresto held due to hypotension  I/O and measure daily weight   Cardiology consulted    4. Chronic mild Hyponatremia   Secondary to volume overload   129 on admission- >128>130>127->131  fluid restriction  Diuretics de-escalated to Lasix 20 mg daily       5. Paroxysmal A-Fib  Cardiology discontinue flecainide. Continue metoprolol  Resume Coumadin     Inpatient, stepdown with telemetry  [facility]      Diet ADULT ORAL NUTRITION SUPPLEMENT; Breakfast, Lunch, Dinner; Standard High Calorie/High Protein Oral Supplement  ADULT DIET;  Regular   DVT Prophylaxis [] Lovenox, []  Heparin, [] SCDs, [] Ambulation,  [] Eliquis, [] Xarelto  [] Coumadin   Code Status DNR-CCA   Disposition From: Home  Expected Disposition: TBD  Estimated Date acute bone or soft tissue abnormality. No evidence of pulmonary embolism Tiny bilateral pleural effusions       CBC:   Recent Labs     08/07/23  1211   WBC 9.1   HGB 12.1*        BMP:    Recent Labs     08/07/23  0114 08/08/23  0117 08/09/23  0435   * 127* 131*   K 4.2 4.2 4.5   CL 95* 93* 96*   CO2 24 25 25   BUN 21 23 23   CREATININE 1.1 0.9 0.8   GLUCOSE 111* 111* 115*     Hepatic:   No results for input(s): AST, ALT, ALB, BILITOT, ALKPHOS in the last 72 hours. Lipids:   Lab Results   Component Value Date/Time    CHOL 120 08/05/2013 09:06 AM    HDL 44 08/05/2013 09:06 AM    TRIG 167 08/05/2013 09:06 AM     Hemoglobin A1C: No results found for: LABA1C  TSH: No results found for: TSH  Troponin:   Lab Results   Component Value Date/Time    TROPONINT 0.667 08/05/2023 12:14 AM    TROPONINT 0.548 08/04/2023 05:00 PM    TROPONINT 0.646 08/04/2023 03:13 PM     Lactic Acid: No results for input(s): LACTA in the last 72 hours. BNP:   No results for input(s): PROBNP in the last 72 hours.     UA:  Lab Results   Component Value Date/Time    NITRU POSITIVE 08/07/2023 02:00 PM    COLORU BROWN 08/07/2023 02:00 PM    WBCUA 128 08/07/2023 02:00 PM    RBCUA 1,603 08/07/2023 02:00 PM    MUCUS RARE 08/07/2023 02:00 PM    TRICHOMONAS NONE SEEN 08/07/2023 02:00 PM    BACTERIA NEGATIVE 08/07/2023 02:00 PM    CLARITYU SLIGHTLY CLOUDY 08/07/2023 02:00 PM    SPECGRAV 1.010 08/07/2023 02:00 PM    LEUKOCYTESUR TRACE 08/07/2023 02:00 PM    UROBILINOGEN 4.0 08/07/2023 02:00 PM    BILIRUBINUR NEGATIVE 08/07/2023 02:00 PM    BLOODU LARGE NUMBER OR AMOUNT OBSERVED 08/07/2023 02:00 PM    [facility] Max NEGATIVE 08/07/2023 02:00 PM     Urine Cultures: No results found for: LABURIN  Blood Cultures: No results found for: BC  No results found for: BLOODCULT2  Organism: No results found for: [facility]      Electronically signed by Marixa Avila MD on 8/9/2023 at 11:58 AM

## 2023-08-09 NOTE — CARE COORDINATION
Swing Bed agreeable to accept pt when a pre-cert is obtained per [facility]/Supervisor/Heather. CM notified CM/MS to start the pre-cert d/t pt is medically ready per Dr John Mayfield. CM/MS to notify CM when she receives the pre-cert. Attempted to update pt. Pt is not in room. Called daughter/Annmarie and updated her.   TE

## 2023-08-10 ENCOUNTER — TELEPHONE (OUTPATIENT)
Dept: CARDIOLOGY CLINIC | Age: 88
End: 2023-08-10

## 2023-08-10 ENCOUNTER — HOSPITAL ENCOUNTER (INPATIENT)
Age: 88
LOS: 6 days | Discharge: HOME HEALTH | DRG: 947 | End: 2023-08-16
Attending: FAMILY MEDICINE | Admitting: FAMILY MEDICINE
Payer: MEDICARE

## 2023-08-10 VITALS
HEIGHT: 64 IN | DIASTOLIC BLOOD PRESSURE: 79 MMHG | RESPIRATION RATE: 26 BRPM | WEIGHT: 150.7 LBS | OXYGEN SATURATION: 96 % | SYSTOLIC BLOOD PRESSURE: 123 MMHG | TEMPERATURE: 98.4 F | HEART RATE: 78 BPM | BODY MASS INDEX: 25.73 KG/M2

## 2023-08-10 PROBLEM — R53.81 PHYSICAL DEBILITY: Status: ACTIVE | Noted: 2023-08-10

## 2023-08-10 LAB
INR BLD: 1 INDEX
PROTHROMBIN TIME: 13.5 SECONDS (ref 11.7–14.5)

## 2023-08-10 PROCEDURE — 6370000000 HC RX 637 (ALT 250 FOR IP)

## 2023-08-10 PROCEDURE — 36415 COLL VENOUS BLD VENIPUNCTURE: CPT

## 2023-08-10 PROCEDURE — 6370000000 HC RX 637 (ALT 250 FOR IP): Performed by: STUDENT IN AN ORGANIZED HEALTH CARE EDUCATION/TRAINING PROGRAM

## 2023-08-10 PROCEDURE — 2580000003 HC RX 258: Performed by: INTERNAL MEDICINE

## 2023-08-10 PROCEDURE — 6360000002 HC RX W HCPCS: Performed by: INTERNAL MEDICINE

## 2023-08-10 PROCEDURE — 1200000002 HC SEMI PRIVATE SWING BED

## 2023-08-10 PROCEDURE — 85610 PROTHROMBIN TIME: CPT

## 2023-08-10 PROCEDURE — 6370000000 HC RX 637 (ALT 250 FOR IP): Performed by: INTERNAL MEDICINE

## 2023-08-10 PROCEDURE — 2580000003 HC RX 258: Performed by: STUDENT IN AN ORGANIZED HEALTH CARE EDUCATION/TRAINING PROGRAM

## 2023-08-10 PROCEDURE — 2500000003 HC RX 250 WO HCPCS: Performed by: STUDENT IN AN ORGANIZED HEALTH CARE EDUCATION/TRAINING PROGRAM

## 2023-08-10 PROCEDURE — 94761 N-INVAS EAR/PLS OXIMETRY MLT: CPT

## 2023-08-10 RX ORDER — SODIUM CHLORIDE 0.9 % (FLUSH) 0.9 %
5-40 SYRINGE (ML) INJECTION PRN
Status: DISCONTINUED | OUTPATIENT
Start: 2023-08-10 | End: 2023-08-10

## 2023-08-10 RX ORDER — LEVOTHYROXINE SODIUM 0.07 MG/1
75 TABLET ORAL DAILY
Status: DISCONTINUED | OUTPATIENT
Start: 2023-08-11 | End: 2023-08-16 | Stop reason: HOSPADM

## 2023-08-10 RX ORDER — GUAIFENESIN/DEXTROMETHORPHAN 100-10MG/5
5 SYRUP ORAL EVERY 4 HOURS PRN
Status: DISCONTINUED | OUTPATIENT
Start: 2023-08-10 | End: 2023-08-16

## 2023-08-10 RX ORDER — ACETAMINOPHEN 650 MG/1
650 SUPPOSITORY RECTAL EVERY 6 HOURS PRN
Status: DISCONTINUED | OUTPATIENT
Start: 2023-08-10 | End: 2023-08-16

## 2023-08-10 RX ORDER — POTASSIUM CHLORIDE 20 MEQ/1
40 TABLET, EXTENDED RELEASE ORAL PRN
Status: DISCONTINUED | OUTPATIENT
Start: 2023-08-10 | End: 2023-08-15

## 2023-08-10 RX ORDER — MAGNESIUM SULFATE IN WATER 40 MG/ML
2000 INJECTION, SOLUTION INTRAVENOUS PRN
Status: DISCONTINUED | OUTPATIENT
Start: 2023-08-10 | End: 2023-08-12

## 2023-08-10 RX ORDER — ENOXAPARIN SODIUM 100 MG/ML
40 INJECTION SUBCUTANEOUS DAILY
Status: DISCONTINUED | OUTPATIENT
Start: 2023-08-11 | End: 2023-08-11

## 2023-08-10 RX ORDER — CALCIUM CARBONATE 500 MG/1
500 TABLET, CHEWABLE ORAL 3 TIMES DAILY PRN
Status: DISCONTINUED | OUTPATIENT
Start: 2023-08-10 | End: 2023-08-16 | Stop reason: HOSPADM

## 2023-08-10 RX ORDER — POLYETHYLENE GLYCOL 3350 17 G/17G
17 POWDER, FOR SOLUTION ORAL DAILY PRN
Status: DISCONTINUED | OUTPATIENT
Start: 2023-08-10 | End: 2023-08-16 | Stop reason: HOSPADM

## 2023-08-10 RX ORDER — ACETAMINOPHEN 325 MG/1
650 TABLET ORAL EVERY 4 HOURS PRN
Status: DISCONTINUED | OUTPATIENT
Start: 2023-08-10 | End: 2023-08-16

## 2023-08-10 RX ORDER — SODIUM CHLORIDE 9 MG/ML
INJECTION, SOLUTION INTRAVENOUS PRN
Status: DISCONTINUED | OUTPATIENT
Start: 2023-08-10 | End: 2023-08-11 | Stop reason: SDUPTHER

## 2023-08-10 RX ORDER — MORPHINE SULFATE 2 MG/ML
2 INJECTION, SOLUTION INTRAMUSCULAR; INTRAVENOUS EVERY 4 HOURS PRN
Status: DISCONTINUED | OUTPATIENT
Start: 2023-08-10 | End: 2023-08-11

## 2023-08-10 RX ORDER — POTASSIUM CHLORIDE 7.45 MG/ML
10 INJECTION INTRAVENOUS PRN
Status: DISCONTINUED | OUTPATIENT
Start: 2023-08-10 | End: 2023-08-12

## 2023-08-10 RX ORDER — SODIUM CHLORIDE 0.9 % (FLUSH) 0.9 %
5-40 SYRINGE (ML) INJECTION EVERY 12 HOURS SCHEDULED
Status: DISCONTINUED | OUTPATIENT
Start: 2023-08-10 | End: 2023-08-10

## 2023-08-10 RX ORDER — SODIUM CHLORIDE 9 MG/ML
INJECTION, SOLUTION INTRAVENOUS PRN
Status: DISCONTINUED | OUTPATIENT
Start: 2023-08-10 | End: 2023-08-12

## 2023-08-10 RX ORDER — PROCHLORPERAZINE EDISYLATE 5 MG/ML
5 INJECTION INTRAMUSCULAR; INTRAVENOUS EVERY 6 HOURS PRN
Status: DISCONTINUED | OUTPATIENT
Start: 2023-08-10 | End: 2023-08-16

## 2023-08-10 RX ORDER — POLYETHYLENE GLYCOL 3350 17 G/17G
17 POWDER, FOR SOLUTION ORAL DAILY
Status: DISCONTINUED | OUTPATIENT
Start: 2023-08-11 | End: 2023-08-16 | Stop reason: HOSPADM

## 2023-08-10 RX ORDER — SODIUM CHLORIDE 0.9 % (FLUSH) 0.9 %
5-40 SYRINGE (ML) INJECTION PRN
Status: DISCONTINUED | OUTPATIENT
Start: 2023-08-10 | End: 2023-08-12

## 2023-08-10 RX ORDER — FUROSEMIDE 20 MG/1
20 TABLET ORAL DAILY
Status: DISCONTINUED | OUTPATIENT
Start: 2023-08-11 | End: 2023-08-16 | Stop reason: HOSPADM

## 2023-08-10 RX ORDER — MIDODRINE HYDROCHLORIDE 5 MG/1
5 TABLET ORAL
Status: DISCONTINUED | OUTPATIENT
Start: 2023-08-10 | End: 2023-08-16 | Stop reason: HOSPADM

## 2023-08-10 RX ORDER — ACETAMINOPHEN 325 MG/1
650 TABLET ORAL EVERY 6 HOURS PRN
Status: DISCONTINUED | OUTPATIENT
Start: 2023-08-10 | End: 2023-08-10

## 2023-08-10 RX ORDER — SODIUM CHLORIDE 0.9 % (FLUSH) 0.9 %
5-40 SYRINGE (ML) INJECTION EVERY 12 HOURS SCHEDULED
Status: DISCONTINUED | OUTPATIENT
Start: 2023-08-10 | End: 2023-08-15

## 2023-08-10 RX ORDER — METOPROLOL SUCCINATE 25 MG/1
25 TABLET, EXTENDED RELEASE ORAL DAILY
Status: DISCONTINUED | OUTPATIENT
Start: 2023-08-11 | End: 2023-08-16 | Stop reason: HOSPADM

## 2023-08-10 RX ORDER — WARFARIN SODIUM 2 MG/1
4 TABLET ORAL EVERY EVENING
Status: DISCONTINUED | OUTPATIENT
Start: 2023-08-10 | End: 2023-08-11

## 2023-08-10 RX ADMIN — SACUBITRIL AND VALSARTAN 1 TABLET: 24; 26 TABLET, FILM COATED ORAL at 20:29

## 2023-08-10 RX ADMIN — MIDODRINE HYDROCHLORIDE 5 MG: 5 TABLET ORAL at 18:29

## 2023-08-10 RX ADMIN — ENOXAPARIN SODIUM 40 MG: 100 INJECTION SUBCUTANEOUS at 09:08

## 2023-08-10 RX ADMIN — ACETAMINOPHEN 650 MG: 325 TABLET ORAL at 20:31

## 2023-08-10 RX ADMIN — LEVOTHYROXINE SODIUM 75 MCG: 0.07 TABLET ORAL at 05:27

## 2023-08-10 RX ADMIN — MICONAZOLE NITRATE: 2 POWDER TOPICAL at 15:29

## 2023-08-10 RX ADMIN — MIDODRINE HYDROCHLORIDE 5 MG: 5 TABLET ORAL at 09:08

## 2023-08-10 RX ADMIN — METOPROLOL SUCCINATE 25 MG: 25 TABLET, FILM COATED, EXTENDED RELEASE ORAL at 09:08

## 2023-08-10 RX ADMIN — SODIUM CHLORIDE, PRESERVATIVE FREE 10 ML: 5 INJECTION INTRAVENOUS at 09:09

## 2023-08-10 RX ADMIN — WARFARIN SODIUM 4 MG: 2 TABLET ORAL at 18:30

## 2023-08-10 RX ADMIN — MIDODRINE HYDROCHLORIDE 5 MG: 5 TABLET ORAL at 15:29

## 2023-08-10 RX ADMIN — POLYETHYLENE GLYCOL (3350) 17 G: 17 POWDER, FOR SOLUTION ORAL at 09:09

## 2023-08-10 ASSESSMENT — PAIN DESCRIPTION - DESCRIPTORS: DESCRIPTORS: ACHING

## 2023-08-10 ASSESSMENT — PAIN DESCRIPTION - ONSET: ONSET: GRADUAL

## 2023-08-10 ASSESSMENT — PAIN SCALES - GENERAL
PAINLEVEL_OUTOF10: 0
PAINLEVEL_OUTOF10: 0
PAINLEVEL_OUTOF10: 3

## 2023-08-10 ASSESSMENT — PAIN DESCRIPTION - LOCATION: LOCATION: HEAD

## 2023-08-10 ASSESSMENT — PAIN - FUNCTIONAL ASSESSMENT: PAIN_FUNCTIONAL_ASSESSMENT: ACTIVITIES ARE NOT PREVENTED

## 2023-08-10 ASSESSMENT — PAIN DESCRIPTION - FREQUENCY: FREQUENCY: INTERMITTENT

## 2023-08-10 ASSESSMENT — PAIN DESCRIPTION - ORIENTATION: ORIENTATION: MID

## 2023-08-10 ASSESSMENT — PAIN DESCRIPTION - PAIN TYPE: TYPE: ACUTE PAIN

## 2023-08-10 NOTE — TELEPHONE ENCOUNTER
Patients daughter called about cardiac event monitor. Nicki Christiansen is in the hospital and will put on when she gets out.

## 2023-08-10 NOTE — TELEPHONE ENCOUNTER
Left vm with patients daughter. I left message to return call ASAP regarding Cardiac Event Monitor as william stated she has received it but hasn't called and put on.

## 2023-08-10 NOTE — DISCHARGE SUMMARY
V2.0  Discharge Summary    Name:  Beryle Shilling /Age/Sex: 1935 (82 y.o. female)   Admit Date: 2023  Discharge Date: 8/10/23    MRN & CSN:  [id number] & [id number] Encounter Date and Time 8/10/23 2:38 PM EDT    Attending:  No att. providers found Discharging Provider: Angy Mendoza MD       Hospital Course:     Brief HPI:   Beryle Shilling is a 82 y.o. female with past medical history of paroxysmal A-fib, hypertension, hyperlipidemia, GERD, hypothyroidism who presents with NSTEMI (non-ST elevated myocardial infarction). Patient presented as a transfer from [facility] ED, where she presented with syncopal episode, worsening BONILLA and nonproductive cough. Patient was recently seen twice in the ED and hospital and by her primary care physician for evaluation of syncopal episodes, which she describes happen when she is sitting on the toilet and she passes out and falls. During that time evaluation was unequivocal and it was deemed that her possibility etiology was vasovagal syncope. Diagnostic evaluation at [facility] ED revealed proBNP 31 K, elevated troponin. Cardiology was consulted who recommended IV heparin drip and 1 dose of IV Lasix. Patient was started on IV heparin drip but due to ? Low blood pressure patient was given 1 L IV bolus and transferred to [facility]. Brief Problem Based Course:      1. Syncope and collapse  Recurrent Lightheadedness  Etiology multifactorial  Fall precautions  Daily PT OT     2. NSTEMI  0.646>0.548  EKG with SR, 76/min, Qtc 571ms, LBBB which appears new from prior EKG  CXR w/o acute abnormality  Was started on IV Heparin drip and discontinued  Cardiology was consulted and patient underwent cardiac cath  which showed no CAD but apical ballooning noted  Continue aspirin and Plavix      3. Acute systolic heart failure   ProBNP 31k  -2D echo 2020 through show EF of 55%.   Repeat on this admission show EF of 40 to 45% with apical hypokinesis  CTA chest with enlarged Relevant Medical/Surgical History: Chest Pain Initial evaluation. FINDINGS: Monitor wires overlie the chest.  The trachea is midline. There is atherosclerotic calcification of the aortic knob. Cardiac silhouette is unremarkable. There is prominence of the interstitial markings which appears to be in part chronic. No obvious superimposed acute process. There is no pleural fluid. There is a left shoulder replacement. There are mild degenerative changes of the right shoulder. Stable appearance to the chest with chronic interstitial markings. No obvious superimposed acute process. CTA PULMONARY W CONTRAST    Result Date: 8/4/2023  EXAMINATION: CTA OF THE CHEST 8/4/2023 4:21 pm TECHNIQUE: CTA of the chest was performed after the administration of intravenous contrast.  Multiplanar reformatted images are provided for review. MIP images are provided for review. Automated exposure control, iterative reconstruction, and/or weight based adjustment of the mA/kV was utilized to reduce the radiation dose to as low as reasonably achievable. COMPARISON: None. HISTORY: ORDERING SYSTEM PROVIDED HISTORY: elevated troponin, elevated bnp, rule out pe chest xray no acute pulmonary edema TECHNOLOGIST PROVIDED HISTORY: Reason for exam:->elevated troponin, elevated bnp, rule out pe chest xray no acute pulmonary edema Decision Support Exception - unselect if not a suspected or confirmed emergency medical condition->Emergency Medical Condition (MA) Reason for Exam: elevated troponin, elevated bnp, rule out pe chest xray no acute pulmonary edema Additional signs and symptoms: 75ml isovue 370 @ lt wrist @ 1635hrs, gfr>60, creat 0.8, 8-4-2023, elevated troponin, elevated bnp, rule out pe chest xray no acute pulmonary edema FINDINGS: Pulmonary Arteries: Pulmonary arteries are adequately opacified for evaluation. No evidence of intraluminal filling defect to suggest pulmonary embolism.   Main pulmonary artery is normal in

## 2023-08-10 NOTE — CARE COORDINATION
Transport arranged with [facility]. ETA is 1100. Notified pt, pt's daughter/Annmarie, pt's nurse and Rubi/HERRERA. [phone]   TE

## 2023-08-11 LAB
INR BLD: 1.2 INDEX
PROTHROMBIN TIME: 15.8 SECONDS (ref 11.7–14.5)

## 2023-08-11 PROCEDURE — 85610 PROTHROMBIN TIME: CPT

## 2023-08-11 PROCEDURE — 97535 SELF CARE MNGMENT TRAINING: CPT

## 2023-08-11 PROCEDURE — 36415 COLL VENOUS BLD VENIPUNCTURE: CPT

## 2023-08-11 PROCEDURE — 6360000002 HC RX W HCPCS: Performed by: NURSE PRACTITIONER

## 2023-08-11 PROCEDURE — 97530 THERAPEUTIC ACTIVITIES: CPT

## 2023-08-11 PROCEDURE — 2500000003 HC RX 250 WO HCPCS: Performed by: STUDENT IN AN ORGANIZED HEALTH CARE EDUCATION/TRAINING PROGRAM

## 2023-08-11 PROCEDURE — 97166 OT EVAL MOD COMPLEX 45 MIN: CPT

## 2023-08-11 PROCEDURE — 6370000000 HC RX 637 (ALT 250 FOR IP): Performed by: STUDENT IN AN ORGANIZED HEALTH CARE EDUCATION/TRAINING PROGRAM

## 2023-08-11 PROCEDURE — 1200000002 HC SEMI PRIVATE SWING BED

## 2023-08-11 PROCEDURE — 94761 N-INVAS EAR/PLS OXIMETRY MLT: CPT

## 2023-08-11 PROCEDURE — 97162 PT EVAL MOD COMPLEX 30 MIN: CPT

## 2023-08-11 RX ORDER — ENOXAPARIN SODIUM 100 MG/ML
1 INJECTION SUBCUTANEOUS 2 TIMES DAILY
Status: DISCONTINUED | OUTPATIENT
Start: 2023-08-11 | End: 2023-08-16

## 2023-08-11 RX ORDER — WARFARIN SODIUM 2 MG/1
4 TABLET ORAL
Status: DISCONTINUED | OUTPATIENT
Start: 2023-08-14 | End: 2023-08-16

## 2023-08-11 RX ORDER — WARFARIN SODIUM 5 MG/1
5 TABLET ORAL
Status: DISCONTINUED | OUTPATIENT
Start: 2023-08-13 | End: 2023-08-16

## 2023-08-11 RX ADMIN — MIDODRINE HYDROCHLORIDE 5 MG: 5 TABLET ORAL at 09:05

## 2023-08-11 RX ADMIN — MICONAZOLE NITRATE: 2 POWDER TOPICAL at 20:13

## 2023-08-11 RX ADMIN — MICONAZOLE NITRATE: 2 POWDER TOPICAL at 10:55

## 2023-08-11 RX ADMIN — METOPROLOL SUCCINATE 25 MG: 25 TABLET, EXTENDED RELEASE ORAL at 09:05

## 2023-08-11 RX ADMIN — MIDODRINE HYDROCHLORIDE 5 MG: 5 TABLET ORAL at 17:37

## 2023-08-11 RX ADMIN — ENOXAPARIN SODIUM 70 MG: 100 INJECTION SUBCUTANEOUS at 20:13

## 2023-08-11 RX ADMIN — ACETAMINOPHEN 650 MG: 325 TABLET ORAL at 18:13

## 2023-08-11 RX ADMIN — ENOXAPARIN SODIUM 70 MG: 100 INJECTION SUBCUTANEOUS at 09:11

## 2023-08-11 RX ADMIN — WARFARIN SODIUM 7 MG: 6 TABLET ORAL at 17:37

## 2023-08-11 RX ADMIN — POLYETHYLENE GLYCOL 3350 17 G: 17 POWDER, FOR SOLUTION ORAL at 09:05

## 2023-08-11 RX ADMIN — MIDODRINE HYDROCHLORIDE 5 MG: 5 TABLET ORAL at 12:16

## 2023-08-11 RX ADMIN — LEVOTHYROXINE SODIUM 75 MCG: 0.07 TABLET ORAL at 05:27

## 2023-08-11 RX ADMIN — FUROSEMIDE 20 MG: 20 TABLET ORAL at 09:05

## 2023-08-11 ASSESSMENT — PAIN - FUNCTIONAL ASSESSMENT: PAIN_FUNCTIONAL_ASSESSMENT: ACTIVITIES ARE NOT PREVENTED

## 2023-08-11 ASSESSMENT — PAIN DESCRIPTION - DESCRIPTORS: DESCRIPTORS: ACHING

## 2023-08-11 ASSESSMENT — PAIN SCALES - GENERAL: PAINLEVEL_OUTOF10: 3

## 2023-08-11 ASSESSMENT — PAIN DESCRIPTION - LOCATION: LOCATION: HIP

## 2023-08-11 ASSESSMENT — PAIN DESCRIPTION - ORIENTATION: ORIENTATION: RIGHT

## 2023-08-11 NOTE — H&P
abdomen are unremarkable. Soft Tissues/Bones: No acute bone or soft tissue abnormality.      No evidence of pulmonary embolism Tiny bilateral pleural effusions         Electronically signed by Biju Stiles MD on 8/11/2023 at 7:56 AM

## 2023-08-12 LAB
ANION GAP SERPL CALCULATED.3IONS-SCNC: 10 MMOL/L (ref 4–16)
BASOPHILS ABSOLUTE: 0.1 K/CU MM
BASOPHILS RELATIVE PERCENT: 1.3 % (ref 0–1)
BUN SERPL-MCNC: 20 MG/DL (ref 6–23)
CALCIUM SERPL-MCNC: 8.9 MG/DL (ref 8.3–10.6)
CHLORIDE BLD-SCNC: 97 MMOL/L (ref 99–110)
CO2: 25 MMOL/L (ref 21–32)
CREAT SERPL-MCNC: 0.9 MG/DL (ref 0.6–1.1)
DIFFERENTIAL TYPE: ABNORMAL
EOSINOPHILS ABSOLUTE: 1 K/CU MM
EOSINOPHILS RELATIVE PERCENT: 13.4 % (ref 0–3)
GFR SERPL CREATININE-BSD FRML MDRD: >60 ML/MIN/1.73M2
GLUCOSE SERPL-MCNC: 96 MG/DL (ref 70–99)
HCT VFR BLD CALC: 39 % (ref 37–47)
HEMOGLOBIN: 12.6 GM/DL (ref 12.5–16)
IMMATURE NEUTROPHIL %: 0.3 % (ref 0–0.43)
INR BLD: 1.3 INDEX
LACTIC ACID, SEPSIS: 1.8 MMOL/L (ref 0.5–1.9)
LACTIC ACID, SEPSIS: 3.1 MMOL/L (ref 0.5–1.9)
LYMPHOCYTES ABSOLUTE: 2.8 K/CU MM
LYMPHOCYTES RELATIVE PERCENT: 37 % (ref 24–44)
MCH RBC QN AUTO: 30.4 PG (ref 27–31)
MCHC RBC AUTO-ENTMCNC: 32.3 % (ref 32–36)
MCV RBC AUTO: 94 FL (ref 78–100)
MONOCYTES ABSOLUTE: 0.9 K/CU MM
MONOCYTES RELATIVE PERCENT: 12.1 % (ref 0–4)
PDW BLD-RTO: 14.6 % (ref 11.7–14.9)
PLATELET # BLD: 348 K/CU MM (ref 140–440)
PMV BLD AUTO: 9.9 FL (ref 7.5–11.1)
POTASSIUM SERPL-SCNC: 4.3 MMOL/L (ref 3.5–5.1)
PROTHROMBIN TIME: 16.8 SECONDS (ref 11.7–14.5)
RBC # BLD: 4.15 M/CU MM (ref 4.2–5.4)
SEGMENTED NEUTROPHILS ABSOLUTE COUNT: 2.7 K/CU MM
SEGMENTED NEUTROPHILS RELATIVE PERCENT: 35.9 % (ref 36–66)
SODIUM BLD-SCNC: 132 MMOL/L (ref 135–145)
TOTAL IMMATURE NEUTOROPHIL: 0.02 K/CU MM
WBC # BLD: 7.5 K/CU MM (ref 4–10.5)

## 2023-08-12 PROCEDURE — 36415 COLL VENOUS BLD VENIPUNCTURE: CPT

## 2023-08-12 PROCEDURE — 6370000000 HC RX 637 (ALT 250 FOR IP): Performed by: STUDENT IN AN ORGANIZED HEALTH CARE EDUCATION/TRAINING PROGRAM

## 2023-08-12 PROCEDURE — 6360000002 HC RX W HCPCS: Performed by: NURSE PRACTITIONER

## 2023-08-12 PROCEDURE — 83605 ASSAY OF LACTIC ACID: CPT

## 2023-08-12 PROCEDURE — 97116 GAIT TRAINING THERAPY: CPT

## 2023-08-12 PROCEDURE — 2580000003 HC RX 258: Performed by: NURSE PRACTITIONER

## 2023-08-12 PROCEDURE — 1200000002 HC SEMI PRIVATE SWING BED

## 2023-08-12 PROCEDURE — 94761 N-INVAS EAR/PLS OXIMETRY MLT: CPT

## 2023-08-12 PROCEDURE — 85610 PROTHROMBIN TIME: CPT

## 2023-08-12 PROCEDURE — 2580000003 HC RX 258: Performed by: STUDENT IN AN ORGANIZED HEALTH CARE EDUCATION/TRAINING PROGRAM

## 2023-08-12 PROCEDURE — 80048 BASIC METABOLIC PNL TOTAL CA: CPT

## 2023-08-12 PROCEDURE — 85025 COMPLETE CBC W/AUTO DIFF WBC: CPT

## 2023-08-12 RX ORDER — PROMETHAZINE HYDROCHLORIDE 12.5 MG/1
12.5 TABLET ORAL EVERY 6 HOURS PRN
Status: DISCONTINUED | OUTPATIENT
Start: 2023-08-12 | End: 2023-08-16 | Stop reason: HOSPADM

## 2023-08-12 RX ORDER — 0.9 % SODIUM CHLORIDE 0.9 %
500 INTRAVENOUS SOLUTION INTRAVENOUS ONCE
Status: COMPLETED | OUTPATIENT
Start: 2023-08-12 | End: 2023-08-12

## 2023-08-12 RX ADMIN — ACETAMINOPHEN 650 MG: 325 TABLET ORAL at 23:53

## 2023-08-12 RX ADMIN — ENOXAPARIN SODIUM 70 MG: 100 INJECTION SUBCUTANEOUS at 09:15

## 2023-08-12 RX ADMIN — LEVOTHYROXINE SODIUM 75 MCG: 0.07 TABLET ORAL at 05:31

## 2023-08-12 RX ADMIN — ENOXAPARIN SODIUM 70 MG: 100 INJECTION SUBCUTANEOUS at 19:54

## 2023-08-12 RX ADMIN — WARFARIN SODIUM 7 MG: 6 TABLET ORAL at 17:29

## 2023-08-12 RX ADMIN — FUROSEMIDE 20 MG: 20 TABLET ORAL at 09:15

## 2023-08-12 RX ADMIN — MIDODRINE HYDROCHLORIDE 5 MG: 5 TABLET ORAL at 12:04

## 2023-08-12 RX ADMIN — MICONAZOLE NITRATE: 2 POWDER TOPICAL at 19:54

## 2023-08-12 RX ADMIN — POLYETHYLENE GLYCOL 3350 17 G: 17 POWDER, FOR SOLUTION ORAL at 09:15

## 2023-08-12 RX ADMIN — MIDODRINE HYDROCHLORIDE 5 MG: 5 TABLET ORAL at 09:15

## 2023-08-12 RX ADMIN — METOPROLOL SUCCINATE 25 MG: 25 TABLET, EXTENDED RELEASE ORAL at 09:15

## 2023-08-12 RX ADMIN — MIDODRINE HYDROCHLORIDE 5 MG: 5 TABLET ORAL at 17:29

## 2023-08-12 RX ADMIN — SODIUM CHLORIDE, PRESERVATIVE FREE 10 ML: 5 INJECTION INTRAVENOUS at 19:43

## 2023-08-12 RX ADMIN — SODIUM CHLORIDE 500 ML: 9 INJECTION, SOLUTION INTRAVENOUS at 11:06

## 2023-08-12 ASSESSMENT — PAIN DESCRIPTION - DESCRIPTORS: DESCRIPTORS: ACHING

## 2023-08-12 ASSESSMENT — PAIN DESCRIPTION - ORIENTATION: ORIENTATION: MID

## 2023-08-12 ASSESSMENT — PAIN DESCRIPTION - LOCATION: LOCATION: HEAD

## 2023-08-12 ASSESSMENT — PAIN SCALES - GENERAL: PAINLEVEL_OUTOF10: 3

## 2023-08-12 ASSESSMENT — PAIN DESCRIPTION - PAIN TYPE: TYPE: ACUTE PAIN

## 2023-08-12 ASSESSMENT — PAIN DESCRIPTION - FREQUENCY: FREQUENCY: INTERMITTENT

## 2023-08-12 ASSESSMENT — PAIN - FUNCTIONAL ASSESSMENT: PAIN_FUNCTIONAL_ASSESSMENT: ACTIVITIES ARE NOT PREVENTED

## 2023-08-12 ASSESSMENT — PAIN DESCRIPTION - ONSET: ONSET: SUDDEN

## 2023-08-12 NOTE — FLOWSHEET NOTE
Physical Therapy Daily Treatment Note   Date: 2023 Room: [address] [place] [address]    Name: Tasneem Garcia : 1935   MRN: [id number] Admission Date:8/10/2023      Restrictions/Precautions: fall risk    Initial Pain level: 0/10    Subjective/Observation: Patient in bed with call light on \"I need to pee\". Pt later states \"well I guess that wasn't so bad\"    Communication with other providers: nursing      Therapeutic Activities/Neuro Re-Education/Gait Training   Date: 23   Date:  Date:  Date:    Bed   Mobility SUP supine<>sit and scooting toward [facility]        STS   Transfer CGA to RW with inc time, proper hand placement      Commode Transfer   CGA        Standing Balance   CGA for amb        Ambulation 30 + 30 + 20 + 20 + 20ft with RW and CGA. Several cues for upright posture        Stairs   x          Therapeutic Exercises   Date:    Date:  Date:  Date:                     Education provided:       Treatment/Activity Tolerance:   [x] Patient limited by fatigue   [] Patient limited by pain   [] Patient limited by other medical complications   [x] Patient tolerated therapy well  [] Other:     Safety Precautions:     [x] Left in bed    [] Left in chair   [x] Call light within reach    [x] Bed alarm on    [] Personal alarm on    [] Other staff present:  [] Family/Caregiver present:      Post Tx Pain Ratin/10       Evaluation Assessment  23  Assessment: Patient is a 82 y.o. female who presents to [facility] for the swingbed program to improve strength and endurance to increase independence with bed mobility, transfers, and ambulation. Recent hospitalization for syncope, NSTEMI, and CHF. Patient would benefit from continued skilled physical therapy services to address decreased strength, endurance, impaired balance, and decline in functional mobility.     Discharge Recommendations:  Continue to assess pending progress        Social/Functional History:  Lives With: Alone  Type of Home: Apartment  Home Layout: One

## 2023-08-13 LAB
INR BLD: 1.4 INDEX
PROTHROMBIN TIME: 18.2 SECONDS (ref 11.7–14.5)

## 2023-08-13 PROCEDURE — 6370000000 HC RX 637 (ALT 250 FOR IP): Performed by: STUDENT IN AN ORGANIZED HEALTH CARE EDUCATION/TRAINING PROGRAM

## 2023-08-13 PROCEDURE — 1200000002 HC SEMI PRIVATE SWING BED

## 2023-08-13 PROCEDURE — 85610 PROTHROMBIN TIME: CPT

## 2023-08-13 PROCEDURE — 6360000002 HC RX W HCPCS: Performed by: NURSE PRACTITIONER

## 2023-08-13 RX ADMIN — ENOXAPARIN SODIUM 70 MG: 100 INJECTION SUBCUTANEOUS at 09:17

## 2023-08-13 RX ADMIN — MICONAZOLE NITRATE: 2 POWDER TOPICAL at 19:50

## 2023-08-13 RX ADMIN — MIDODRINE HYDROCHLORIDE 5 MG: 5 TABLET ORAL at 09:18

## 2023-08-13 RX ADMIN — MIDODRINE HYDROCHLORIDE 5 MG: 5 TABLET ORAL at 12:13

## 2023-08-13 RX ADMIN — WARFARIN SODIUM 5 MG: 5 TABLET ORAL at 17:17

## 2023-08-13 RX ADMIN — LEVOTHYROXINE SODIUM 75 MCG: 0.07 TABLET ORAL at 05:29

## 2023-08-13 RX ADMIN — METOPROLOL SUCCINATE 25 MG: 25 TABLET, EXTENDED RELEASE ORAL at 09:17

## 2023-08-13 RX ADMIN — POLYETHYLENE GLYCOL 3350 17 G: 17 POWDER, FOR SOLUTION ORAL at 09:17

## 2023-08-13 RX ADMIN — ANTACID TABLETS 500 MG: 500 TABLET, CHEWABLE ORAL at 10:03

## 2023-08-13 RX ADMIN — FUROSEMIDE 20 MG: 20 TABLET ORAL at 09:18

## 2023-08-13 RX ADMIN — ENOXAPARIN SODIUM 70 MG: 100 INJECTION SUBCUTANEOUS at 19:50

## 2023-08-13 RX ADMIN — MICONAZOLE NITRATE: 2 POWDER TOPICAL at 09:18

## 2023-08-13 RX ADMIN — MIDODRINE HYDROCHLORIDE 5 MG: 5 TABLET ORAL at 17:17

## 2023-08-13 ASSESSMENT — PAIN SCALES - GENERAL: PAINLEVEL_OUTOF10: 0

## 2023-08-13 NOTE — CARE COORDINATION
CM submitted updated clinical documentation to [id number] to request an extension to the patient's stay in the swing bed program.  CM will follow.

## 2023-08-14 LAB
INR BLD: 1.7 INDEX
PROTHROMBIN TIME: 20.6 SECONDS (ref 11.7–14.5)

## 2023-08-14 PROCEDURE — 94761 N-INVAS EAR/PLS OXIMETRY MLT: CPT

## 2023-08-14 PROCEDURE — 97530 THERAPEUTIC ACTIVITIES: CPT

## 2023-08-14 PROCEDURE — 97110 THERAPEUTIC EXERCISES: CPT

## 2023-08-14 PROCEDURE — 6360000002 HC RX W HCPCS: Performed by: NURSE PRACTITIONER

## 2023-08-14 PROCEDURE — 85610 PROTHROMBIN TIME: CPT

## 2023-08-14 PROCEDURE — 6370000000 HC RX 637 (ALT 250 FOR IP): Performed by: STUDENT IN AN ORGANIZED HEALTH CARE EDUCATION/TRAINING PROGRAM

## 2023-08-14 PROCEDURE — 36415 COLL VENOUS BLD VENIPUNCTURE: CPT

## 2023-08-14 PROCEDURE — 1200000002 HC SEMI PRIVATE SWING BED

## 2023-08-14 RX ADMIN — MIDODRINE HYDROCHLORIDE 5 MG: 5 TABLET ORAL at 16:46

## 2023-08-14 RX ADMIN — LEVOTHYROXINE SODIUM 75 MCG: 0.07 TABLET ORAL at 06:18

## 2023-08-14 RX ADMIN — WARFARIN SODIUM 4 MG: 2 TABLET ORAL at 16:46

## 2023-08-14 RX ADMIN — MIDODRINE HYDROCHLORIDE 5 MG: 5 TABLET ORAL at 08:11

## 2023-08-14 RX ADMIN — ENOXAPARIN SODIUM 70 MG: 100 INJECTION SUBCUTANEOUS at 20:17

## 2023-08-14 RX ADMIN — MICONAZOLE NITRATE: 2 POWDER TOPICAL at 20:18

## 2023-08-14 RX ADMIN — METOPROLOL SUCCINATE 25 MG: 25 TABLET, EXTENDED RELEASE ORAL at 08:11

## 2023-08-14 RX ADMIN — FUROSEMIDE 20 MG: 20 TABLET ORAL at 08:11

## 2023-08-14 RX ADMIN — ENOXAPARIN SODIUM 70 MG: 100 INJECTION SUBCUTANEOUS at 08:11

## 2023-08-14 RX ADMIN — POLYETHYLENE GLYCOL 3350 17 G: 17 POWDER, FOR SOLUTION ORAL at 08:11

## 2023-08-14 RX ADMIN — MIDODRINE HYDROCHLORIDE 5 MG: 5 TABLET ORAL at 12:10

## 2023-08-14 ASSESSMENT — PAIN SCALES - GENERAL: PAINLEVEL_OUTOF10: 0

## 2023-08-14 NOTE — CARE COORDINATION
Chart reviewed. Cm updated by staff that the patient wants to go home today or tomorrow. Cm received a fax with last covered date the 8/16. She is agreeable to make that her discharge date. She states she is open to [facility] [address] at discharge. She has a walker, grab bars in the bathroom room, lifted toilet and lift chair. She states she is planning to go home after lunch on Wednesday.

## 2023-08-14 NOTE — CARE COORDINATION
08/14/23 1550   Service Assessment   Patient Orientation Alert and Oriented   Cognition Alert   History Provided By Patient   Primary [address] is: Patient Declined (Legal Next of Kin Remains as Decision Maker)   PCP Verified by CM Yes   Last Visit to PCP Within last 6 months   Prior Functional Level Assistance with the following:;Mobility; Bathing;Dressing; Toileting   Current Functional Level Assistance with the following:;Mobility; Bathing;Dressing; Toileting   Can patient return to prior living arrangement Yes   Ability to make needs known: Good   Family able to assist with home care needs: Yes   Would you like for me to discuss the discharge plan with any other family members/significant others, and if so, who? No   Financial Resources Medicare   Discharge Planning   Type of Residence Apartment   Living Arrangements Alone   Current DME Prior to [facility]  (lift chair, grab bars in bathroom by toilet and shower, and raised toilet seat, has a life alert, pull strings in bathroom and bedroom)   [address] Medications No   Condition of Participation: Discharge Planning   The Patient and/or Patient Representative was provided with a Choice of Provider? Patient   The Patient and/Or Patient Representative agree with the Discharge Plan? Yes   Freedom of Choice list was provided with basic dialogue that supports the patient's individualized plan of care/goals, treatment preferences, and shares the quality data associated with the providers? Yes     The patient lives at [facility] in an apartment, she on the the first floor. She uses a walker to get around. Her two daughters check on her daily and her neighbors check on her daily. She states she has all the DME she needs. She states that she wants to go home with [facility] on Wednesday. Cm is following.

## 2023-08-14 NOTE — CARE COORDINATION
[facility] signed, copy given to the patient and faxed to insurance company and placed in soft chart.

## 2023-08-14 NOTE — FLOWSHEET NOTE
Physical Therapy Daily Treatment Note   Date: 2023 Room: [address] [place] [address]    Name: Manny Burrows : 1935   MRN: [id number] Admission Date:8/10/2023      Restrictions/Precautions: fall risk    Initial Pain level: 0/10    Subjective/Observation: Patient presented sleeping in bed. She reports that she is willing to work with therapy. Pt denied pain, but then later reported the arthritis in her knees is bothering her. Communication with other providers: none      Therapeutic Activities/Neuro Re-Education/Gait Training   Date: 23   Date: 23 Date:  Date:    Bed   Mobility SUP supine<>sit and scooting toward HOB Supine to sit: Mod I using the features of the bed. STS   Transfer CGA to RW with inc time, proper hand placement Sit to stand: CGA   Stand to sit: CGA-supervision      Commode Transfer   CGA   Not performed. Standing Balance   CGA for amb Not performed. Ambulation 30 + 30 + 20 + 20 + 20ft with RW and CGA. Several cues for upright posture 40', 35', 80'    CGA cues to keep walker close       Stairs   x   Not performed. Therapeutic Exercises   Date: 23   Date:  Date:  Date:   Ankle pumps 1x10 B LE      Seated marching 1x10 B LE      LAQ 1x10 5\" B LE        Education provided: Pt educated that OT will see her again this afternoon. Treatment/Activity Tolerance:   [x] Patient limited by fatigue   [] Patient limited by pain   [] Patient limited by other medical complications   [x] Patient tolerated therapy well  [] Other:     Safety Precautions:     [] Left in bed    [x] Left in chair   [x] Call light within reach    [] Bed alarm on    [x] Personal alarm on    [] Other staff present:  [] Family/Caregiver present:    Post Tx Pain Ratin/10     Evaluation Assessment  23  Assessment: Patient is a 82 y.o. female who presents to [facility] for the swingbed program to improve strength and endurance to increase independence with bed mobility, transfers, and ambulation.

## 2023-08-14 NOTE — PLAN OF CARE
Problem: Discharge Planning  Goal: Discharge to home or other facility with appropriate resources  8/14/2023 0934 by Balaji Mloina RN  Outcome: Progressing  8/13/2023 2352 by Bal Abdi RN  Outcome: Progressing     Problem: Safety - Adult  Goal: Free from fall injury  8/14/2023 0934 by Balaji Molina RN  Outcome: Progressing  8/13/2023 2352 by Bal Abdi RN  Outcome: Progressing     Problem: Pain  Goal: Verbalizes/displays adequate comfort level or baseline comfort level  8/14/2023 0934 by Balaji Molina RN  Outcome: Progressing  8/13/2023 2352 by Bal Abdi RN  Outcome: Progressing     Problem: ABCDS Injury Assessment  Goal: Absence of physical injury  8/14/2023 0934 by Balaji Molina RN  Outcome: Progressing  8/13/2023 2352 by Bal Abdi RN  Outcome: Progressing

## 2023-08-15 LAB
INR BLD: 1.9 INDEX
PROTHROMBIN TIME: 22.5 SECONDS (ref 11.7–14.5)

## 2023-08-15 PROCEDURE — 97535 SELF CARE MNGMENT TRAINING: CPT

## 2023-08-15 PROCEDURE — 97110 THERAPEUTIC EXERCISES: CPT

## 2023-08-15 PROCEDURE — 36415 COLL VENOUS BLD VENIPUNCTURE: CPT

## 2023-08-15 PROCEDURE — 6370000000 HC RX 637 (ALT 250 FOR IP): Performed by: NURSE PRACTITIONER

## 2023-08-15 PROCEDURE — 6360000002 HC RX W HCPCS: Performed by: NURSE PRACTITIONER

## 2023-08-15 PROCEDURE — 1200000002 HC SEMI PRIVATE SWING BED

## 2023-08-15 PROCEDURE — 85610 PROTHROMBIN TIME: CPT

## 2023-08-15 PROCEDURE — 6370000000 HC RX 637 (ALT 250 FOR IP): Performed by: STUDENT IN AN ORGANIZED HEALTH CARE EDUCATION/TRAINING PROGRAM

## 2023-08-15 PROCEDURE — 94761 N-INVAS EAR/PLS OXIMETRY MLT: CPT

## 2023-08-15 PROCEDURE — 97530 THERAPEUTIC ACTIVITIES: CPT

## 2023-08-15 RX ADMIN — PROMETHAZINE HYDROCHLORIDE 12.5 MG: 12.5 TABLET ORAL at 21:56

## 2023-08-15 RX ADMIN — MIDODRINE HYDROCHLORIDE 5 MG: 5 TABLET ORAL at 08:04

## 2023-08-15 RX ADMIN — ENOXAPARIN SODIUM 70 MG: 100 INJECTION SUBCUTANEOUS at 20:32

## 2023-08-15 RX ADMIN — METOPROLOL SUCCINATE 25 MG: 25 TABLET, EXTENDED RELEASE ORAL at 08:03

## 2023-08-15 RX ADMIN — LEVOTHYROXINE SODIUM 75 MCG: 0.07 TABLET ORAL at 04:53

## 2023-08-15 RX ADMIN — POLYETHYLENE GLYCOL 3350 17 G: 17 POWDER, FOR SOLUTION ORAL at 08:04

## 2023-08-15 RX ADMIN — PROMETHAZINE HYDROCHLORIDE 12.5 MG: 12.5 TABLET ORAL at 09:06

## 2023-08-15 RX ADMIN — MICONAZOLE NITRATE: 2 POWDER TOPICAL at 08:04

## 2023-08-15 RX ADMIN — ACETAMINOPHEN 650 MG: 325 TABLET ORAL at 20:32

## 2023-08-15 RX ADMIN — FUROSEMIDE 20 MG: 20 TABLET ORAL at 08:04

## 2023-08-15 RX ADMIN — WARFARIN SODIUM 5 MG: 5 TABLET ORAL at 17:21

## 2023-08-15 RX ADMIN — MIDODRINE HYDROCHLORIDE 5 MG: 5 TABLET ORAL at 12:24

## 2023-08-15 RX ADMIN — ENOXAPARIN SODIUM 70 MG: 100 INJECTION SUBCUTANEOUS at 08:04

## 2023-08-15 ASSESSMENT — PAIN - FUNCTIONAL ASSESSMENT: PAIN_FUNCTIONAL_ASSESSMENT: ACTIVITIES ARE NOT PREVENTED

## 2023-08-15 ASSESSMENT — PAIN DESCRIPTION - ONSET: ONSET: GRADUAL

## 2023-08-15 ASSESSMENT — PAIN DESCRIPTION - LOCATION: LOCATION: HEAD;BACK

## 2023-08-15 ASSESSMENT — PAIN DESCRIPTION - PAIN TYPE: TYPE: CHRONIC PAIN

## 2023-08-15 ASSESSMENT — PAIN DESCRIPTION - FREQUENCY: FREQUENCY: INTERMITTENT

## 2023-08-15 ASSESSMENT — PAIN SCALES - GENERAL
PAINLEVEL_OUTOF10: 0
PAINLEVEL_OUTOF10: 1

## 2023-08-15 ASSESSMENT — PAIN DESCRIPTION - ORIENTATION: ORIENTATION: MID

## 2023-08-15 ASSESSMENT — PAIN DESCRIPTION - DESCRIPTORS: DESCRIPTORS: ACHING

## 2023-08-15 NOTE — CARE COORDINATION
CM met with the patient for a follow-up discussion regarding discharge planning. Patient received a [address] [facility] [address] letter 8/14/23 from her insurance provider with last covered date of 8/16/23. Patient confirmed that she plans to return home home tomorrow (8/16/23) and would like a referral made to [facility]. Patient is unable to identify any other needs at this time. CM will follow. 9:49 AM  CM faxed the [address] [address] [address] order and supporting documentation to [facility] to initiate the referral.  CM will follow.

## 2023-08-16 VITALS
SYSTOLIC BLOOD PRESSURE: 124 MMHG | WEIGHT: 155.87 LBS | HEART RATE: 78 BPM | OXYGEN SATURATION: 99 % | BODY MASS INDEX: 26.61 KG/M2 | RESPIRATION RATE: 16 BRPM | HEIGHT: 64 IN | TEMPERATURE: 97.5 F | DIASTOLIC BLOOD PRESSURE: 70 MMHG

## 2023-08-16 LAB
INR BLD: 1.7 INDEX
PROTHROMBIN TIME: 20.4 SECONDS (ref 11.7–14.5)

## 2023-08-16 PROCEDURE — 6360000002 HC RX W HCPCS: Performed by: NURSE PRACTITIONER

## 2023-08-16 PROCEDURE — 94761 N-INVAS EAR/PLS OXIMETRY MLT: CPT

## 2023-08-16 PROCEDURE — 85610 PROTHROMBIN TIME: CPT

## 2023-08-16 PROCEDURE — 6370000000 HC RX 637 (ALT 250 FOR IP): Performed by: STUDENT IN AN ORGANIZED HEALTH CARE EDUCATION/TRAINING PROGRAM

## 2023-08-16 PROCEDURE — 36415 COLL VENOUS BLD VENIPUNCTURE: CPT

## 2023-08-16 RX ORDER — METOPROLOL SUCCINATE 25 MG/1
25 TABLET, EXTENDED RELEASE ORAL DAILY
Qty: 30 TABLET | Refills: 0 | Status: SHIPPED | OUTPATIENT
Start: 2023-08-17

## 2023-08-16 RX ORDER — CALCIUM CARBONATE 500 MG/1
500 TABLET, CHEWABLE ORAL 3 TIMES DAILY PRN
Qty: 160 TABLET | Refills: 0 | Status: SHIPPED | OUTPATIENT
Start: 2023-08-16

## 2023-08-16 RX ORDER — WARFARIN SODIUM 5 MG/1
TABLET ORAL
Qty: 30 TABLET | Refills: 0 | Status: SHIPPED | OUTPATIENT
Start: 2023-08-17

## 2023-08-16 RX ORDER — WARFARIN SODIUM 5 MG/1
5 TABLET ORAL DAILY
Status: DISCONTINUED | OUTPATIENT
Start: 2023-08-17 | End: 2023-08-16

## 2023-08-16 RX ORDER — MIDODRINE HYDROCHLORIDE 5 MG/1
5 TABLET ORAL
Qty: 90 TABLET | Refills: 0 | Status: SHIPPED | OUTPATIENT
Start: 2023-08-16

## 2023-08-16 RX ORDER — WARFARIN SODIUM 5 MG/1
5 TABLET ORAL DAILY
Status: DISCONTINUED | OUTPATIENT
Start: 2023-08-17 | End: 2023-08-16 | Stop reason: HOSPADM

## 2023-08-16 RX ORDER — FUROSEMIDE 20 MG/1
20 TABLET ORAL DAILY
Qty: 30 TABLET | Refills: 0 | Status: SHIPPED | OUTPATIENT
Start: 2023-08-17

## 2023-08-16 RX ADMIN — MIDODRINE HYDROCHLORIDE 5 MG: 5 TABLET ORAL at 08:02

## 2023-08-16 RX ADMIN — ENOXAPARIN SODIUM 70 MG: 100 INJECTION SUBCUTANEOUS at 08:01

## 2023-08-16 RX ADMIN — METOPROLOL SUCCINATE 25 MG: 25 TABLET, EXTENDED RELEASE ORAL at 08:02

## 2023-08-16 RX ADMIN — MIDODRINE HYDROCHLORIDE 5 MG: 5 TABLET ORAL at 11:49

## 2023-08-16 RX ADMIN — LEVOTHYROXINE SODIUM 75 MCG: 0.07 TABLET ORAL at 05:04

## 2023-08-16 RX ADMIN — POLYETHYLENE GLYCOL 3350 17 G: 17 POWDER, FOR SOLUTION ORAL at 08:01

## 2023-08-16 RX ADMIN — FUROSEMIDE 20 MG: 20 TABLET ORAL at 08:02

## 2023-08-16 NOTE — DISCHARGE SUMMARY
thought to be vasovagal in nature. She is to follow-up with cardiology outpatient. She has had no syncopal episodes while in the swing bed program.  Recent NSTEMI was treated at [facility], she was seen by cardiology she is status post left heart catheterization on 8/7/2023 that showed no CAD but apical ballooning. Patient was not started on aspirin or Plavix. Acute on chronic systolic/diastolic heart failure, continue p.o. Lasix on discharge. Last echocardiogram showed an EF of 40 to 45% with apical hypokinesis and grade 2 diastolic dysfunction. While in [facility] Keli Starcher was discontinued secondary to low below blood pressure patient was started on midodrine and is to continue beta-blocker. Paroxysmal atrial fibrillation, she is to continue with her beta-blocker of metoprolol, continue Coumadin she does follow with Dr. Albaro Orantes who monitors her PT/INR. She is discharging with home health we will check her INR on 8/18/2023 and send results to Dr. Albaro Orantes. Flecainide was discontinued while in [facility] by cardiology. Leukocytosis, has resolved  Chronic hyponatremia, last sodium was 132 and stable. Hypothyroidism, patient is to continue her home dose of Synthroid. The patient expressed appropriate understanding of, and agreement with the discharge recommendations, medications, and plan.      Consults this admission:  PHARMACY TO DOSE WARFARIN  IP CONSULT TO HOME CARE NEEDS  IP CONSULT TO HOME CARE NEEDS    Discharge Diagnosis:   NSTEMI (non-ST elevated myocardial infarction) [facility])        Discharge Instruction:   Follow up appointments:   Primary care physician: Jaja Frey MD within 1 weeks  Diet: low fat, low cholesterol diet low salt  Activity: activity as tolerated  Disposition: Discharged to:   [x]Home, [x]C, []SNF, []Acute Rehab, []Hospice   Condition on discharge: Stable  Labs and Tests to be Followed up as an outpatient by PCP or Specialist:

## 2023-08-16 NOTE — CARE COORDINATION
CM faxed an updated [address] [address] [address] order to [facility] with instructions to check the patient's INR Friday 8/18/23 and to call results to the patient's PCP's office who normally manages the patient's warfarin. MARIELLE also left a voicemail for Heather at [facility] notifying her that the patient will be discharged today and will need her INR level checked Friday 8/18/23. CM will follow. 11:33 AM  CM faxed the discharge summary and the patient's discharge instructions to [facility].

## 2023-08-16 NOTE — PLAN OF CARE
Problem: Discharge Planning  Goal: Discharge to home or other facility with appropriate resources  Outcome: Progressing     Problem: Safety - Adult  Goal: Free from fall injury  Outcome: Progressing     Problem: Pain  Goal: Verbalizes/displays adequate comfort level or baseline comfort level  Outcome: Progressing     Problem: ABCDS Injury Assessment  Goal: Absence of physical injury  Outcome: Progressing     Problem: Cardiovascular - Adult  Goal: Maintains optimal cardiac output and hemodynamic stability  Outcome: Progressing     Problem: Musculoskeletal - Adult  Goal: Return mobility to safest level of function  Outcome: Progressing  Goal: Maintain proper alignment of affected body part  Outcome: Progressing  Goal: Return ADL status to a safe level of function  Outcome: Progressing     Problem: Chronic Conditions and Co-morbidities  Goal: Patient's chronic conditions and co-morbidity symptoms are monitored and maintained or improved  Outcome: Progressing

## 2023-08-17 ENCOUNTER — APPOINTMENT (OUTPATIENT)
Dept: CT IMAGING | Age: 88
End: 2023-08-17
Attending: EMERGENCY MEDICINE
Payer: MEDICARE

## 2023-08-17 ENCOUNTER — HOSPITAL ENCOUNTER (EMERGENCY)
Age: 88
Discharge: HOME | End: 2023-08-17
Attending: EMERGENCY MEDICINE
Payer: MEDICARE

## 2023-08-17 VITALS
TEMPERATURE: 98.1 F | HEIGHT: 64 IN | DIASTOLIC BLOOD PRESSURE: 76 MMHG | HEART RATE: 70 BPM | RESPIRATION RATE: 18 BRPM | OXYGEN SATURATION: 98 % | WEIGHT: 155 LBS | BODY MASS INDEX: 26.46 KG/M2 | SYSTOLIC BLOOD PRESSURE: 167 MMHG

## 2023-08-17 DIAGNOSIS — M47.816 SPONDYLOSIS OF LUMBAR REGION WITHOUT MYELOPATHY OR RADICULOPATHY: Primary | ICD-10-CM

## 2023-08-17 LAB
BACTERIA: ABNORMAL /HPF
BILIRUBIN URINE: NEGATIVE MG/DL
BLOOD, URINE: NEGATIVE
CAST TYPE: ABNORMAL /HPF
CLARITY: CLEAR
COLOR: YELLOW
CRYSTAL TYPE: NEGATIVE /HPF
EPITHELIAL CELLS, UA: 5 /HPF
GLUCOSE, URINE: NEGATIVE MG/DL
KETONES, URINE: NEGATIVE MG/DL
LEUKOCYTE ESTERASE, URINE: ABNORMAL
NITRITE URINE, QUANTITATIVE: NEGATIVE
PH, URINE: 7 (ref 5–8)
PROTEIN UA: NEGATIVE MG/DL
RBC URINE: NEGATIVE /HPF (ref 0–6)
SPECIFIC GRAVITY UA: 1.01 (ref 1–1.03)
UROBILINOGEN, URINE: 0.2 MG/DL (ref 0.2–1)
WBC UA: 5 /HPF (ref 0–5)

## 2023-08-17 PROCEDURE — 96372 THER/PROPH/DIAG INJ SC/IM: CPT

## 2023-08-17 PROCEDURE — 72128 CT CHEST SPINE W/O DYE: CPT

## 2023-08-17 PROCEDURE — 6370000000 HC RX 637 (ALT 250 FOR IP): Performed by: EMERGENCY MEDICINE

## 2023-08-17 PROCEDURE — 81001 URINALYSIS AUTO W/SCOPE: CPT

## 2023-08-17 PROCEDURE — 72131 CT LUMBAR SPINE W/O DYE: CPT

## 2023-08-17 PROCEDURE — 6360000002 HC RX W HCPCS: Performed by: EMERGENCY MEDICINE

## 2023-08-17 PROCEDURE — 99284 EMERGENCY DEPT VISIT MOD MDM: CPT

## 2023-08-17 PROCEDURE — 87086 URINE CULTURE/COLONY COUNT: CPT

## 2023-08-17 RX ORDER — DEXAMETHASONE SODIUM PHOSPHATE 10 MG/ML
6 INJECTION, SOLUTION INTRAMUSCULAR; INTRAVENOUS ONCE
Status: COMPLETED | OUTPATIENT
Start: 2023-08-17 | End: 2023-08-17

## 2023-08-17 RX ORDER — FENTANYL CITRATE 50 UG/ML
50 INJECTION, SOLUTION INTRAMUSCULAR; INTRAVENOUS ONCE
Status: COMPLETED | OUTPATIENT
Start: 2023-08-17 | End: 2023-08-17

## 2023-08-17 RX ORDER — METHOCARBAMOL 500 MG/1
500 TABLET, FILM COATED ORAL ONCE
Status: COMPLETED | OUTPATIENT
Start: 2023-08-17 | End: 2023-08-17

## 2023-08-17 RX ORDER — ACETAMINOPHEN 325 MG/1
650 TABLET ORAL ONCE
Status: COMPLETED | OUTPATIENT
Start: 2023-08-17 | End: 2023-08-17

## 2023-08-17 RX ORDER — PREDNISONE 20 MG/1
20 TABLET ORAL 2 TIMES DAILY
Qty: 10 TABLET | Refills: 0 | Status: SHIPPED | OUTPATIENT
Start: 2023-08-17 | End: 2023-08-22

## 2023-08-17 RX ORDER — LIDOCAINE 50 MG/G
1 PATCH TOPICAL DAILY
Qty: 10 PATCH | Refills: 0 | Status: ON HOLD | OUTPATIENT
Start: 2023-08-17 | End: 2023-08-30 | Stop reason: HOSPADM

## 2023-08-17 RX ORDER — LIDOCAINE 4 G/G
1 PATCH TOPICAL ONCE
Status: DISCONTINUED | OUTPATIENT
Start: 2023-08-17 | End: 2023-08-17 | Stop reason: HOSPADM

## 2023-08-17 RX ADMIN — METHOCARBAMOL 500 MG: 500 TABLET ORAL at 18:27

## 2023-08-17 RX ADMIN — ACETAMINOPHEN 650 MG: 325 TABLET ORAL at 18:27

## 2023-08-17 RX ADMIN — DEXAMETHASONE SODIUM PHOSPHATE 6 MG: 10 INJECTION, SOLUTION INTRAMUSCULAR; INTRAVENOUS at 18:30

## 2023-08-17 RX ADMIN — FENTANYL CITRATE 50 MCG: 50 INJECTION, SOLUTION INTRAMUSCULAR; INTRAVENOUS at 18:30

## 2023-08-17 ASSESSMENT — PAIN SCALES - GENERAL
PAINLEVEL_OUTOF10: 8

## 2023-08-17 ASSESSMENT — LIFESTYLE VARIABLES: HOW OFTEN DO YOU HAVE A DRINK CONTAINING ALCOHOL: NEVER

## 2023-08-17 ASSESSMENT — PAIN DESCRIPTION - ORIENTATION: ORIENTATION: LEFT;RIGHT;LOWER

## 2023-08-17 ASSESSMENT — PAIN DESCRIPTION - PAIN TYPE: TYPE: ACUTE PAIN

## 2023-08-17 ASSESSMENT — PAIN - FUNCTIONAL ASSESSMENT: PAIN_FUNCTIONAL_ASSESSMENT: 0-10

## 2023-08-17 ASSESSMENT — PAIN DESCRIPTION - LOCATION: LOCATION: BACK

## 2023-08-17 NOTE — ED PROVIDER NOTES
Emergency Department Encounter    Patient: Opal Westbrook  MRN: [id number]  : 1935  Date of Evaluation: 2023  ED Provider:  Jacklyn Adams DO    Triage Chief Complaint:   Back Pain (Pt arrives per wheelchair with daughters who state pt was discharged yesterday from [address] program and was having back pain, pt daughter's state pt was getting worse all day and later unable to ambulate d/tpain. Daughters do not know of any injury but were hoping for xrays and pain relief)    [place]:  Opal Westbrook is a 82 y.o. female with history of arthritis, hyperlipidemia, paroxysmal atrial fibrillation, mild aortic stenosis, hypertension, diverticulitis, heart murmur, NSTEMI, physical debility that presents to the emergency department complaining of low back pain. Daughter at the bedside gives most of the history. She states patient was admitted to the hospital for the last 1-1/2 weeks and recently discharged yesterday. She states patient was walking back and forth to the bathroom ate lunch prior to discharge yesterday. She states prior to discharge patient did complain of some low back pain. She states that she figured it was from laying in the hospital for the last week and a half. She states patient with no history of any back problems back surgeries and no recent falls. When speaking with patient she denies any falls injuries trauma no heavy lifting pulling or straining. She states back pain 8 out of 10 on the pain scale worse with movement. She states she did take Tylenol at 10:30 AM.  Daughter states patient use a walker for assist device and lives alone. States the back pain is gotten worse patient with difficulty ambulating. She states she called patient primary care physician Dr. Chapo Rueda and has appointment with PCP on Monday and 4 days. She states she was hoping patient to get something for pain and x-rays.   Patient denies any bowel or bladder incontinence no numbness and tingling

## 2023-08-17 NOTE — DISCHARGE INSTRUCTIONS
Follow-up with primary care physician in 4 days as scheduled for evaluation of low back pain. Call for an appointment  Follow-up with spine specialist Dr. Rolf Burrows for evaluation of degenerative disc disease of the lumbar spine. Call in the morning for an appointment. Apply Lidoderm pain patch to affected area twice a day  Take prednisone 20 mg twice a day for inflammation pain and swelling  Take Tylenol every 4 hours as needed for pain  Return to the emergency department immediately increased pain any numbness tingling weakness extremities inability to ambulate or any worsening symptoms.

## 2023-08-17 NOTE — ED NOTES
Pt ambulates to door and back to wheelchair per walker with 2 person standby assist.  Pt believes she is able to go home     Bruce Ervin RN  08/17/23 4012

## 2023-08-19 LAB
CULTURE: NORMAL
Lab: NORMAL
SPECIMEN: NORMAL

## 2023-08-26 ENCOUNTER — HOSPITAL ENCOUNTER (INPATIENT)
Age: 88
LOS: 4 days | Discharge: NURSING, REHAB OR LONG-TERM CARE | DRG: 947 | End: 2023-08-30
Attending: HOSPITALIST | Admitting: HOSPITALIST
Payer: MEDICARE

## 2023-08-26 ENCOUNTER — APPOINTMENT (OUTPATIENT)
Dept: GENERAL RADIOLOGY | Age: 88
DRG: 947 | End: 2023-08-26
Payer: MEDICARE

## 2023-08-26 DIAGNOSIS — E86.0 DEHYDRATION: ICD-10-CM

## 2023-08-26 DIAGNOSIS — M54.50 CHRONIC LOW BACK PAIN, UNSPECIFIED BACK PAIN LATERALITY, UNSPECIFIED WHETHER SCIATICA PRESENT: ICD-10-CM

## 2023-08-26 DIAGNOSIS — G89.29 CHRONIC LOW BACK PAIN, UNSPECIFIED BACK PAIN LATERALITY, UNSPECIFIED WHETHER SCIATICA PRESENT: ICD-10-CM

## 2023-08-26 DIAGNOSIS — R53.1 GENERALIZED WEAKNESS: Primary | ICD-10-CM

## 2023-08-26 LAB
ALBUMIN SERPL-MCNC: 4.1 GM/DL (ref 3.4–5)
ALP BLD-CCNC: 87 IU/L (ref 40–129)
ALT SERPL-CCNC: 34 U/L (ref 10–40)
ANION GAP SERPL CALCULATED.3IONS-SCNC: 21 MMOL/L (ref 4–16)
AST SERPL-CCNC: 38 IU/L (ref 15–37)
BACTERIA: ABNORMAL /HPF
BASOPHILS ABSOLUTE: 0.1 K/CU MM
BASOPHILS RELATIVE PERCENT: 0.3 % (ref 0–1)
BILIRUB SERPL-MCNC: 1.2 MG/DL (ref 0–1)
BILIRUBIN URINE: NEGATIVE MG/DL
BLOOD, URINE: ABNORMAL
BUN SERPL-MCNC: 28 MG/DL (ref 6–23)
C DIFF AG + TOXIN: NORMAL
CALCIUM SERPL-MCNC: 8.6 MG/DL (ref 8.3–10.6)
CHLORIDE BLD-SCNC: 91 MMOL/L (ref 99–110)
CLARITY: CLEAR
CO2: 18 MMOL/L (ref 21–32)
COLOR: YELLOW
CREAT SERPL-MCNC: 0.9 MG/DL (ref 0.6–1.1)
DIFFERENTIAL TYPE: ABNORMAL
EOSINOPHILS ABSOLUTE: 0.5 K/CU MM
EOSINOPHILS RELATIVE PERCENT: 3.5 % (ref 0–3)
GFR SERPL CREATININE-BSD FRML MDRD: >60 ML/MIN/1.73M2
GLUCOSE SERPL-MCNC: 97 MG/DL (ref 70–99)
GLUCOSE, URINE: NEGATIVE MG/DL
HCT VFR BLD CALC: 44.3 % (ref 37–47)
HEMOGLOBIN: 14.1 GM/DL (ref 12.5–16)
IMMATURE NEUTROPHIL %: 2.5 % (ref 0–0.43)
KETONES, URINE: ABNORMAL MG/DL
LACTATE: 2.1 MMOL/L (ref 0.5–1.9)
LEUKOCYTE ESTERASE, URINE: ABNORMAL
LIPASE: 60 IU/L (ref 13–60)
LYMPHOCYTES ABSOLUTE: 2.4 K/CU MM
LYMPHOCYTES RELATIVE PERCENT: 15.7 % (ref 24–44)
MAGNESIUM: 2.5 MG/DL (ref 1.8–2.4)
MCH RBC QN AUTO: 29.9 PG (ref 27–31)
MCHC RBC AUTO-ENTMCNC: 31.8 % (ref 32–36)
MCV RBC AUTO: 94.1 FL (ref 78–100)
MONOCYTES ABSOLUTE: 1.3 K/CU MM
MONOCYTES RELATIVE PERCENT: 8.1 % (ref 0–4)
NITRITE URINE, QUANTITATIVE: NEGATIVE
NUCLEATED RBC %: 0 %
PDW BLD-RTO: 14.5 % (ref 11.7–14.9)
PH, URINE: 6 (ref 5–8)
PLATELET # BLD: 500 K/CU MM (ref 140–440)
PMV BLD AUTO: 9.5 FL (ref 7.5–11.1)
POTASSIUM SERPL-SCNC: 4 MMOL/L (ref 3.5–5.1)
PRO-BNP: ABNORMAL PG/ML
PROTEIN UA: NEGATIVE MG/DL
RBC # BLD: 4.71 M/CU MM (ref 4.2–5.4)
RBC URINE: 10 /HPF (ref 0–6)
REASON FOR REJECTION: NORMAL
REJECTED TEST: NORMAL
SEGMENTED NEUTROPHILS ABSOLUTE COUNT: 10.9 K/CU MM
SEGMENTED NEUTROPHILS RELATIVE PERCENT: 69.9 % (ref 36–66)
SODIUM BLD-SCNC: 130 MMOL/L (ref 135–145)
SOURCE: NORMAL
SPECIFIC GRAVITY UA: 1.01 (ref 1–1.03)
T4 FREE SERPL-MCNC: 2.05 NG/DL (ref 0.9–1.8)
TOTAL IMMATURE NEUTOROPHIL: 0.38 K/CU MM
TOTAL NUCLEATED RBC: 0 K/CU MM
TOTAL PROTEIN: 6.7 GM/DL (ref 6.4–8.2)
TRICHOMONAS: ABNORMAL /HPF
TROPONIN T: 0.06 NG/ML
TSH SERPL DL<=0.005 MIU/L-ACNC: 4.23 UIU/ML (ref 0.27–4.2)
UROBILINOGEN, URINE: 1 MG/DL (ref 0.2–1)
WBC # BLD: 15.5 K/CU MM (ref 4–10.5)
WBC UA: <1 /HPF (ref 0–5)

## 2023-08-26 PROCEDURE — 71045 X-RAY EXAM CHEST 1 VIEW: CPT

## 2023-08-26 PROCEDURE — 99285 EMERGENCY DEPT VISIT HI MDM: CPT

## 2023-08-26 PROCEDURE — 84484 ASSAY OF TROPONIN QUANT: CPT

## 2023-08-26 PROCEDURE — 2580000003 HC RX 258: Performed by: NURSE PRACTITIONER

## 2023-08-26 PROCEDURE — 6360000002 HC RX W HCPCS: Performed by: HOSPITALIST

## 2023-08-26 PROCEDURE — 87449 NOS EACH ORGANISM AG IA: CPT

## 2023-08-26 PROCEDURE — 83735 ASSAY OF MAGNESIUM: CPT

## 2023-08-26 PROCEDURE — 6370000000 HC RX 637 (ALT 250 FOR IP): Performed by: NURSE PRACTITIONER

## 2023-08-26 PROCEDURE — 1200000000 HC SEMI PRIVATE

## 2023-08-26 PROCEDURE — 94761 N-INVAS EAR/PLS OXIMETRY MLT: CPT

## 2023-08-26 PROCEDURE — 83690 ASSAY OF LIPASE: CPT

## 2023-08-26 PROCEDURE — 80053 COMPREHEN METABOLIC PANEL: CPT

## 2023-08-26 PROCEDURE — 81001 URINALYSIS AUTO W/SCOPE: CPT

## 2023-08-26 PROCEDURE — 84439 ASSAY OF FREE THYROXINE: CPT

## 2023-08-26 PROCEDURE — 93005 ELECTROCARDIOGRAM TRACING: CPT | Performed by: NURSE PRACTITIONER

## 2023-08-26 PROCEDURE — 87324 CLOSTRIDIUM AG IA: CPT

## 2023-08-26 PROCEDURE — 85025 COMPLETE CBC W/AUTO DIFF WBC: CPT

## 2023-08-26 PROCEDURE — 2580000003 HC RX 258: Performed by: HOSPITALIST

## 2023-08-26 PROCEDURE — 83880 ASSAY OF NATRIURETIC PEPTIDE: CPT

## 2023-08-26 PROCEDURE — 84443 ASSAY THYROID STIM HORMONE: CPT

## 2023-08-26 PROCEDURE — 6370000000 HC RX 637 (ALT 250 FOR IP): Performed by: HOSPITALIST

## 2023-08-26 PROCEDURE — 96361 HYDRATE IV INFUSION ADD-ON: CPT

## 2023-08-26 PROCEDURE — 83605 ASSAY OF LACTIC ACID: CPT

## 2023-08-26 PROCEDURE — 96360 HYDRATION IV INFUSION INIT: CPT

## 2023-08-26 PROCEDURE — P9612 CATHETERIZE FOR URINE SPEC: HCPCS

## 2023-08-26 PROCEDURE — 87086 URINE CULTURE/COLONY COUNT: CPT

## 2023-08-26 RX ORDER — POLYETHYLENE GLYCOL 3350 17 G/17G
17 POWDER, FOR SOLUTION ORAL DAILY PRN
Status: DISCONTINUED | OUTPATIENT
Start: 2023-08-26 | End: 2023-08-30 | Stop reason: HOSPADM

## 2023-08-26 RX ORDER — ACETAMINOPHEN 650 MG/1
650 SUPPOSITORY RECTAL EVERY 6 HOURS PRN
Status: DISCONTINUED | OUTPATIENT
Start: 2023-08-26 | End: 2023-08-30 | Stop reason: HOSPADM

## 2023-08-26 RX ORDER — ONDANSETRON 2 MG/ML
4 INJECTION INTRAMUSCULAR; INTRAVENOUS EVERY 6 HOURS PRN
Status: DISCONTINUED | OUTPATIENT
Start: 2023-08-26 | End: 2023-08-30 | Stop reason: HOSPADM

## 2023-08-26 RX ORDER — MIDODRINE HYDROCHLORIDE 5 MG/1
5 TABLET ORAL
Status: DISCONTINUED | OUTPATIENT
Start: 2023-08-27 | End: 2023-08-30 | Stop reason: HOSPADM

## 2023-08-26 RX ORDER — POTASSIUM CHLORIDE 7.45 MG/ML
10 INJECTION INTRAVENOUS PRN
Status: DISCONTINUED | OUTPATIENT
Start: 2023-08-26 | End: 2023-08-30 | Stop reason: HOSPADM

## 2023-08-26 RX ORDER — POTASSIUM CHLORIDE 20 MEQ/1
40 TABLET, EXTENDED RELEASE ORAL PRN
Status: DISCONTINUED | OUTPATIENT
Start: 2023-08-26 | End: 2023-08-30 | Stop reason: HOSPADM

## 2023-08-26 RX ORDER — LEVOTHYROXINE SODIUM 0.07 MG/1
75 TABLET ORAL DAILY
Status: DISCONTINUED | OUTPATIENT
Start: 2023-08-27 | End: 2023-08-30

## 2023-08-26 RX ORDER — ENOXAPARIN SODIUM 100 MG/ML
40 INJECTION SUBCUTANEOUS DAILY
Status: DISCONTINUED | OUTPATIENT
Start: 2023-08-26 | End: 2023-08-27

## 2023-08-26 RX ORDER — MAGNESIUM SULFATE 1 G/100ML
1000 INJECTION INTRAVENOUS PRN
Status: DISCONTINUED | OUTPATIENT
Start: 2023-08-26 | End: 2023-08-30 | Stop reason: HOSPADM

## 2023-08-26 RX ORDER — ACETAMINOPHEN 325 MG/1
650 TABLET ORAL EVERY 6 HOURS PRN
Status: DISCONTINUED | OUTPATIENT
Start: 2023-08-26 | End: 2023-08-30 | Stop reason: HOSPADM

## 2023-08-26 RX ORDER — SODIUM CHLORIDE 0.9 % (FLUSH) 0.9 %
5-40 SYRINGE (ML) INJECTION PRN
Status: DISCONTINUED | OUTPATIENT
Start: 2023-08-26 | End: 2023-08-30 | Stop reason: HOSPADM

## 2023-08-26 RX ORDER — 0.9 % SODIUM CHLORIDE 0.9 %
500 INTRAVENOUS SOLUTION INTRAVENOUS ONCE
Status: COMPLETED | OUTPATIENT
Start: 2023-08-26 | End: 2023-08-26

## 2023-08-26 RX ORDER — WARFARIN SODIUM 5 MG/1
5 TABLET ORAL DAILY
Status: DISCONTINUED | OUTPATIENT
Start: 2023-08-27 | End: 2023-08-26

## 2023-08-26 RX ORDER — SODIUM CHLORIDE 0.9 % (FLUSH) 0.9 %
5-40 SYRINGE (ML) INJECTION EVERY 12 HOURS SCHEDULED
Status: DISCONTINUED | OUTPATIENT
Start: 2023-08-26 | End: 2023-08-30 | Stop reason: HOSPADM

## 2023-08-26 RX ORDER — SODIUM CHLORIDE, SODIUM LACTATE, POTASSIUM CHLORIDE, CALCIUM CHLORIDE 600; 310; 30; 20 MG/100ML; MG/100ML; MG/100ML; MG/100ML
INJECTION, SOLUTION INTRAVENOUS CONTINUOUS
Status: ACTIVE | OUTPATIENT
Start: 2023-08-26 | End: 2023-08-27

## 2023-08-26 RX ORDER — ONDANSETRON 4 MG/1
4 TABLET, ORALLY DISINTEGRATING ORAL EVERY 8 HOURS PRN
Status: DISCONTINUED | OUTPATIENT
Start: 2023-08-26 | End: 2023-08-30 | Stop reason: HOSPADM

## 2023-08-26 RX ORDER — MIDODRINE HYDROCHLORIDE 5 MG/1
5 TABLET ORAL
Status: DISCONTINUED | OUTPATIENT
Start: 2023-08-26 | End: 2023-08-26

## 2023-08-26 RX ORDER — METOPROLOL SUCCINATE 25 MG/1
25 TABLET, EXTENDED RELEASE ORAL DAILY
Status: DISCONTINUED | OUTPATIENT
Start: 2023-08-26 | End: 2023-08-30 | Stop reason: HOSPADM

## 2023-08-26 RX ORDER — SODIUM CHLORIDE 9 MG/ML
INJECTION, SOLUTION INTRAVENOUS PRN
Status: DISCONTINUED | OUTPATIENT
Start: 2023-08-26 | End: 2023-08-30 | Stop reason: HOSPADM

## 2023-08-26 RX ORDER — HYDROXYZINE HYDROCHLORIDE 10 MG/1
10 TABLET, FILM COATED ORAL 3 TIMES DAILY PRN
Status: DISCONTINUED | OUTPATIENT
Start: 2023-08-26 | End: 2023-08-30 | Stop reason: HOSPADM

## 2023-08-26 RX ADMIN — MIDODRINE HYDROCHLORIDE 5 MG: 5 TABLET ORAL at 17:54

## 2023-08-26 RX ADMIN — ACETAMINOPHEN 650 MG: 325 TABLET ORAL at 22:05

## 2023-08-26 RX ADMIN — HYDROXYZINE HYDROCHLORIDE 10 MG: 10 TABLET ORAL at 23:50

## 2023-08-26 RX ADMIN — MIDODRINE HYDROCHLORIDE 5 MG: 5 TABLET ORAL at 13:39

## 2023-08-26 RX ADMIN — SODIUM CHLORIDE, POTASSIUM CHLORIDE, SODIUM LACTATE AND CALCIUM CHLORIDE: 600; 310; 30; 20 INJECTION, SOLUTION INTRAVENOUS at 22:56

## 2023-08-26 RX ADMIN — METOPROLOL SUCCINATE 25 MG: 25 TABLET, EXTENDED RELEASE ORAL at 22:05

## 2023-08-26 RX ADMIN — ENOXAPARIN SODIUM 40 MG: 100 INJECTION SUBCUTANEOUS at 22:05

## 2023-08-26 RX ADMIN — SODIUM CHLORIDE, PRESERVATIVE FREE 10 ML: 5 INJECTION INTRAVENOUS at 22:12

## 2023-08-26 RX ADMIN — SODIUM CHLORIDE 500 ML: 9 INJECTION, SOLUTION INTRAVENOUS at 13:40

## 2023-08-26 ASSESSMENT — PAIN SCALES - GENERAL
PAINLEVEL_OUTOF10: 10

## 2023-08-26 ASSESSMENT — PAIN DESCRIPTION - LOCATION
LOCATION: BACK

## 2023-08-26 ASSESSMENT — PAIN DESCRIPTION - DESCRIPTORS
DESCRIPTORS: ACHING;DISCOMFORT
DESCRIPTORS: ACHING
DESCRIPTORS: ACHING

## 2023-08-26 ASSESSMENT — PAIN DESCRIPTION - FREQUENCY: FREQUENCY: INTERMITTENT

## 2023-08-26 ASSESSMENT — ENCOUNTER SYMPTOMS
WHEEZING: 0
COUGH: 0
NAUSEA: 0
VOMITING: 0
SHORTNESS OF BREATH: 1

## 2023-08-26 ASSESSMENT — PAIN DESCRIPTION - ORIENTATION
ORIENTATION: LOWER
ORIENTATION: MID

## 2023-08-26 ASSESSMENT — PAIN DESCRIPTION - PAIN TYPE: TYPE: ACUTE PAIN

## 2023-08-26 NOTE — ED NOTES
..ED TO INPATIENT SBAR HANDOFF    Patient Name: Audrey Cordova   :  1935  82 y.o. Preferred Name  Nicki Christiansen   Family/Caregiver Present no   Restraints no   C-SSRS: Risk of Suicide: No Risk  Sitter no   Sepsis Risk Score Sepsis Risk Score: 1.46      Situation  Chief Complaint   Patient presents with    Back Pain    Nausea     Lower back pain and nausea     Brief Description of Patient's Condition: Patient arrives with back pain and nausea. Patient states she has not been feeling well the past few weeks. Admit for further monitoring and workup. Mental Status: oriented and alert  Arrived from: home    Imaging:   XR CHEST PORTABLE   Final Result   No acute cardiopulmonary process.            Abnormal labs:   Abnormal Labs Reviewed   CBC WITH AUTO DIFFERENTIAL - Abnormal; Notable for the following components:       Result Value    WBC 15.5 (*)     MCHC 31.8 (*)     Platelets 223 (*)     Segs Relative 69.9 (*)     Lymphocytes % 15.7 (*)     Monocytes % 8.1 (*)     Eosinophils % 3.5 (*)     Immature Neutrophil % 2.5 (*)     All other components within normal limits   URINALYSIS - Abnormal; Notable for the following components:    Ketones, Urine TRACE (*)     Blood, Urine SMALL NUMBER OR AMOUNT OBSERVED (*)     Leukocyte Esterase, Urine TRACE (*)     All other components within normal limits   LACTIC ACID - Abnormal; Notable for the following components:    Lactate 2.1 (*)     All other components within normal limits   MICROSCOPIC URINALYSIS - Abnormal; Notable for the following components:    RBC, UA 10 (*)     Bacteria, UA FEW (*)     All other components within normal limits   COMPREHENSIVE METABOLIC PANEL - Abnormal; Notable for the following components:    Sodium 130 (*)     Chloride 91 (*)     CO2 18 (*)     BUN 28 (*)     Total Bilirubin 1.2 (*)     AST 38 (*)     Anion Gap 21 (*)     All other components within normal limits   MAGNESIUM - Abnormal; Notable for the following components:    Magnesium 2.5 (*)

## 2023-08-26 NOTE — H&P
V2.0  History and Physical      Name:  Sameera García /Age/Sex: 1935  (82 y.o. female)   MRN & CSN:  [id number] & [id number] Encounter Date/Time: 2023 5:15 PM EDT   Location:  [address]- PCP: Hanna Monroy MD       Hospital Day: 1    Assessment and Plan:   Sameera García is a 82 y.o. female who presents with weakness     Weakness-possibly due to over diuresis. Family states patient was placed on Lasix after her NSTEMI, and has lost weight. She becomes short of breath and gets dizzy with ambulation. PT/OT. Encourage oral intake. Blood pressure had been low in ER. TSH 4.2. Low BP-possibly due to overdiuresis. Patient given midodrine and BP improved. Final cardiology note from  mentions patient being hypovolemic and recommending holding Lasix. Due to systolic and diastolic dysfunction, will hold off on giving IV fluids. BP appears to be improving with midodrine. If remains low then will give a small amount of fluids. Dehydration-likely due to being on Lasix. Hold Lasix. Encourage oral intake. UA shows ketones. Hyponatremia-sodium 130. Monitor sodium levels and see if improves with increased oral intake. Check urine sodium. Hypomagnesemia-magnesium 2.5. Monitor levels. Elevated troponin-improved from . Patient is having palpitations. EKG shows inverted T waves in lateral leads and V2. Consult cardiology. Patient had NSTEMI on -8/10. [facility] : Patent blood vessels. Leukocytosis and thrombocytosis-likely reactive. Monitor. At this time no signs of infection. CXR shows no acute process. UA shows only trace leukocyte Esterase. Patient denies any dysuria. Low back pain-CT lumbar spine showed unchanged grade 1 anterolisthesis at L4-L5 and unchanged mild compression deformity at T6.  PT/OT. Patient has been unsuccessful in trying to schedule an appointment with Dr. Sunni Owen. Consult neurosurgery.   No saddle anesthesia or loss of bowel or bladder

## 2023-08-26 NOTE — ED NOTES
Urine obtained during straight cath. 600 ml of urine returned.  Urine sample sent to lab       Juan Alberto Davis RN  08/26/23 5412

## 2023-08-26 NOTE — CARE COORDINATION
Pt noted for possible readmission. Pt recently admitted 8/4-8/10/23 for NSTEMI. Pt was d/c to Swing bed in [facility] where she was admitted 8/10-8/16/23 for skilled rehab. Pt lives alone, has walker, lift chair, PCP, and insurance. Pt is active with [facility] and has family/friend support. Pt had ed visit 8/17/23 for back pain. Pt returns with back pain and nausea. CM noted that pt was being admitted and pt/family would like to look at skilled rehab again. Reported that pt has not been able to get up, daughters are unable to provided 24 hr care and having a hard time caring for self. CM printed list of SNF's. Pt is open to going and would like to speak to daughters who had left the room. Possibly [facility]. Inpt CM please follow up tomorrow for final choice.

## 2023-08-26 NOTE — ED PROVIDER NOTES
36,732 (*)     All other components within normal limits   T4, FREE - Abnormal; Notable for the following components:    T4 Free 2.05 (*)     All other components within normal limits   TSH - Abnormal; Notable for the following components:    TSH, High Sensitivity 4.230 (*)     All other components within normal limits   C DIFF TOXIN/ANTIGEN   CULTURE, URINE   SPECIMEN REJECTION   LIPASE       When ordered, only abnormal lab results are displayed. All other labs were within normal range or not returned as of this dictation. RADIOLOGY:   Non-plain film images such as CT, Ultrasound and MRI are read by the radiologist. Plain radiographic images are visualized and preliminarily interpreted by the  ED Provider with the below findings:    Interpretation perthe Radiologist below, if available at the time of this note:    XR CHEST PORTABLE   Final Result   No acute cardiopulmonary process. No results found. PROCEDURES   Unless otherwise noted below, none       Procedures    CRITICAL CARE   CRITICAL CARE NOTE:  N/A    CONSULTS:  None      VITALS:   Vitals:    Vitals:    08/26/23 1530 08/26/23 1600 08/26/23 1630 08/26/23 1700   BP: 101/63 111/75 111/63 119/60   Pulse: 66 68 70 70   Resp: 22 19 19 20   Temp:       TempSrc:       SpO2: 100%   98%       EMERGENCY DEPARTMENT COURSE and MDM:   Patient presents as above. Emergent etiologies considered. Patient seen and examined. Work-up initiated secondary to presentation, physical exam findings, vital signs and medical chart review. Sepsis Consideration:   Exclusion criteria - the patient is NOT to be included for SEP-1 Core Measure due to:   Infection is not suspected     History from : Patient and Family daughters    Limitations to history : None    Patient was given the following medications:  Medications   midodrine (PROAMATINE) tablet 5 mg (5 mg Oral Given 8/26/23 1339)   sodium chloride 0.9 % bolus 500 mL (0 mLs IntraVENous Stopped 8/26/23 1636)

## 2023-08-27 LAB
ALBUMIN SERPL-MCNC: 3.2 GM/DL (ref 3.4–5)
ALP BLD-CCNC: 72 IU/L (ref 40–128)
ALT SERPL-CCNC: 23 U/L (ref 10–40)
ANION GAP SERPL CALCULATED.3IONS-SCNC: 18 MMOL/L (ref 4–16)
AST SERPL-CCNC: 34 IU/L (ref 15–37)
BILIRUB SERPL-MCNC: 1.3 MG/DL (ref 0–1)
BUN SERPL-MCNC: 24 MG/DL (ref 6–23)
CALCIUM SERPL-MCNC: 8.3 MG/DL (ref 8.3–10.6)
CHLORIDE BLD-SCNC: 93 MMOL/L (ref 99–110)
CO2: 18 MMOL/L (ref 21–32)
CREAT SERPL-MCNC: 0.8 MG/DL (ref 0.6–1.1)
GFR SERPL CREATININE-BSD FRML MDRD: >60 ML/MIN/1.73M2
GLUCOSE SERPL-MCNC: 100 MG/DL (ref 70–99)
INR BLD: 2.7 INDEX
POTASSIUM SERPL-SCNC: 4 MMOL/L (ref 3.5–5.1)
PROTHROMBIN TIME: 29 SECONDS (ref 11.7–14.5)
SODIUM BLD-SCNC: 129 MMOL/L (ref 135–145)
SODIUM URINE: 50 MMOL/L (ref 35–167)
TOTAL PROTEIN: 5.2 GM/DL (ref 6.4–8.2)

## 2023-08-27 PROCEDURE — 1200000000 HC SEMI PRIVATE

## 2023-08-27 PROCEDURE — 84300 ASSAY OF URINE SODIUM: CPT

## 2023-08-27 PROCEDURE — 85610 PROTHROMBIN TIME: CPT

## 2023-08-27 PROCEDURE — 99222 1ST HOSP IP/OBS MODERATE 55: CPT | Performed by: INTERNAL MEDICINE

## 2023-08-27 PROCEDURE — 2580000003 HC RX 258: Performed by: HOSPITALIST

## 2023-08-27 PROCEDURE — 6370000000 HC RX 637 (ALT 250 FOR IP): Performed by: HOSPITALIST

## 2023-08-27 PROCEDURE — 85025 COMPLETE CBC W/AUTO DIFF WBC: CPT

## 2023-08-27 PROCEDURE — 94761 N-INVAS EAR/PLS OXIMETRY MLT: CPT

## 2023-08-27 PROCEDURE — 80053 COMPREHEN METABOLIC PANEL: CPT

## 2023-08-27 PROCEDURE — 36415 COLL VENOUS BLD VENIPUNCTURE: CPT

## 2023-08-27 PROCEDURE — 85730 THROMBOPLASTIN TIME PARTIAL: CPT

## 2023-08-27 PROCEDURE — 6370000000 HC RX 637 (ALT 250 FOR IP): Performed by: INTERNAL MEDICINE

## 2023-08-27 RX ORDER — TRAMADOL HYDROCHLORIDE 50 MG/1
50 TABLET ORAL EVERY 6 HOURS PRN
Status: DISCONTINUED | OUTPATIENT
Start: 2023-08-27 | End: 2023-08-30 | Stop reason: HOSPADM

## 2023-08-27 RX ORDER — WARFARIN SODIUM 5 MG/1
5 TABLET ORAL DAILY
Status: DISCONTINUED | OUTPATIENT
Start: 2023-08-27 | End: 2023-08-30 | Stop reason: HOSPADM

## 2023-08-27 RX ADMIN — LEVOTHYROXINE SODIUM 75 MCG: 75 TABLET ORAL at 06:07

## 2023-08-27 RX ADMIN — METOPROLOL SUCCINATE 25 MG: 25 TABLET, EXTENDED RELEASE ORAL at 08:51

## 2023-08-27 RX ADMIN — MIDODRINE HYDROCHLORIDE 5 MG: 5 TABLET ORAL at 11:30

## 2023-08-27 RX ADMIN — MIDODRINE HYDROCHLORIDE 5 MG: 5 TABLET ORAL at 16:48

## 2023-08-27 RX ADMIN — SODIUM CHLORIDE, PRESERVATIVE FREE 10 ML: 5 INJECTION INTRAVENOUS at 08:51

## 2023-08-27 RX ADMIN — MIDODRINE HYDROCHLORIDE 5 MG: 5 TABLET ORAL at 08:51

## 2023-08-27 RX ADMIN — TRAMADOL HYDROCHLORIDE 50 MG: 50 TABLET, COATED ORAL at 16:48

## 2023-08-27 RX ADMIN — ACETAMINOPHEN 650 MG: 325 TABLET ORAL at 11:29

## 2023-08-27 RX ADMIN — WARFARIN SODIUM 5 MG: 5 TABLET ORAL at 16:49

## 2023-08-27 ASSESSMENT — PAIN SCALES - GENERAL
PAINLEVEL_OUTOF10: 7
PAINLEVEL_OUTOF10: 9
PAINLEVEL_OUTOF10: 2

## 2023-08-27 ASSESSMENT — PAIN DESCRIPTION - DESCRIPTORS
DESCRIPTORS: ACHING

## 2023-08-27 ASSESSMENT — PAIN DESCRIPTION - ORIENTATION
ORIENTATION: MID
ORIENTATION: LEFT

## 2023-08-27 ASSESSMENT — PAIN DESCRIPTION - LOCATION
LOCATION: BACK
LOCATION: HEAD
LOCATION: RIB CAGE

## 2023-08-28 LAB
ANION GAP SERPL CALCULATED.3IONS-SCNC: 13 MMOL/L (ref 4–16)
BASOPHILS ABSOLUTE: 0.1 K/CU MM
BASOPHILS RELATIVE PERCENT: 0.5 % (ref 0–1)
BUN SERPL-MCNC: 21 MG/DL (ref 6–23)
CALCIUM SERPL-MCNC: 8.1 MG/DL (ref 8.3–10.6)
CHLORIDE BLD-SCNC: 96 MMOL/L (ref 99–110)
CO2: 21 MMOL/L (ref 21–32)
CREAT SERPL-MCNC: 0.8 MG/DL (ref 0.6–1.1)
CULTURE: ABNORMAL
CULTURE: ABNORMAL
DIFFERENTIAL TYPE: ABNORMAL
EKG ATRIAL RATE: 64 BPM
EKG DIAGNOSIS: NORMAL
EKG P AXIS: 7 DEGREES
EKG P-R INTERVAL: 170 MS
EKG Q-T INTERVAL: 492 MS
EKG QRS DURATION: 74 MS
EKG QTC CALCULATION (BAZETT): 507 MS
EKG R AXIS: -17 DEGREES
EKG T AXIS: 78 DEGREES
EKG VENTRICULAR RATE: 64 BPM
EOSINOPHILS ABSOLUTE: 0.6 K/CU MM
EOSINOPHILS RELATIVE PERCENT: 5.2 % (ref 0–3)
GFR SERPL CREATININE-BSD FRML MDRD: >60 ML/MIN/1.73M2
GLUCOSE SERPL-MCNC: 92 MG/DL (ref 70–99)
HCT VFR BLD CALC: 42 % (ref 37–47)
HEMOGLOBIN: 12.9 GM/DL (ref 12.5–16)
IMMATURE NEUTROPHIL %: 1.3 % (ref 0–0.43)
INR BLD: 2.4 INDEX
LACTATE: 0.9 MMOL/L (ref 0.5–1.9)
LYMPHOCYTES ABSOLUTE: 2.2 K/CU MM
LYMPHOCYTES RELATIVE PERCENT: 19.1 % (ref 24–44)
Lab: ABNORMAL
MAGNESIUM: 2 MG/DL (ref 1.8–2.4)
MCH RBC QN AUTO: 31.2 PG (ref 27–31)
MCHC RBC AUTO-ENTMCNC: 30.7 % (ref 32–36)
MCV RBC AUTO: 101.7 FL (ref 78–100)
MONOCYTES ABSOLUTE: 1 K/CU MM
MONOCYTES RELATIVE PERCENT: 8.1 % (ref 0–4)
NUCLEATED RBC %: 0.2 %
PDW BLD-RTO: 14.8 % (ref 11.7–14.9)
PLATELET # BLD: 347 K/CU MM (ref 140–440)
PMV BLD AUTO: 9.5 FL (ref 7.5–11.1)
POTASSIUM SERPL-SCNC: 3.5 MMOL/L (ref 3.5–5.1)
PROTHROMBIN TIME: 26.1 SECONDS (ref 11.7–14.5)
RBC # BLD: 4.13 M/CU MM (ref 4.2–5.4)
REASON FOR REJECTION: NORMAL
REJECTED TEST: NORMAL
SEGMENTED NEUTROPHILS ABSOLUTE COUNT: 7.7 K/CU MM
SEGMENTED NEUTROPHILS RELATIVE PERCENT: 65.8 % (ref 36–66)
SODIUM BLD-SCNC: 130 MMOL/L (ref 135–145)
SPECIMEN: ABNORMAL
TOTAL IMMATURE NEUTOROPHIL: 0.15 K/CU MM
TOTAL NUCLEATED RBC: 0 K/CU MM
TSH SERPL DL<=0.005 MIU/L-ACNC: 4.81 UIU/ML (ref 0.27–4.2)
WBC # BLD: 11.7 K/CU MM (ref 4–10.5)

## 2023-08-28 PROCEDURE — 2580000003 HC RX 258: Performed by: HOSPITALIST

## 2023-08-28 PROCEDURE — APPNB60 APP NON BILLABLE TIME 46-60 MINS: Performed by: NURSE PRACTITIONER

## 2023-08-28 PROCEDURE — 85025 COMPLETE CBC W/AUTO DIFF WBC: CPT

## 2023-08-28 PROCEDURE — 99232 SBSQ HOSP IP/OBS MODERATE 35: CPT | Performed by: INTERNAL MEDICINE

## 2023-08-28 PROCEDURE — 93010 ELECTROCARDIOGRAM REPORT: CPT | Performed by: INTERNAL MEDICINE

## 2023-08-28 PROCEDURE — 83605 ASSAY OF LACTIC ACID: CPT

## 2023-08-28 PROCEDURE — 36415 COLL VENOUS BLD VENIPUNCTURE: CPT

## 2023-08-28 PROCEDURE — 94761 N-INVAS EAR/PLS OXIMETRY MLT: CPT

## 2023-08-28 PROCEDURE — 1200000000 HC SEMI PRIVATE

## 2023-08-28 PROCEDURE — 83735 ASSAY OF MAGNESIUM: CPT

## 2023-08-28 PROCEDURE — 97166 OT EVAL MOD COMPLEX 45 MIN: CPT

## 2023-08-28 PROCEDURE — 97116 GAIT TRAINING THERAPY: CPT

## 2023-08-28 PROCEDURE — 80048 BASIC METABOLIC PNL TOTAL CA: CPT

## 2023-08-28 PROCEDURE — 6370000000 HC RX 637 (ALT 250 FOR IP): Performed by: HOSPITALIST

## 2023-08-28 PROCEDURE — 6370000000 HC RX 637 (ALT 250 FOR IP): Performed by: INTERNAL MEDICINE

## 2023-08-28 PROCEDURE — 84443 ASSAY THYROID STIM HORMONE: CPT

## 2023-08-28 PROCEDURE — 97530 THERAPEUTIC ACTIVITIES: CPT

## 2023-08-28 PROCEDURE — 99223 1ST HOSP IP/OBS HIGH 75: CPT | Performed by: INTERNAL MEDICINE

## 2023-08-28 PROCEDURE — 85610 PROTHROMBIN TIME: CPT

## 2023-08-28 PROCEDURE — 97162 PT EVAL MOD COMPLEX 30 MIN: CPT

## 2023-08-28 RX ORDER — METHOCARBAMOL 500 MG/1
500 TABLET, FILM COATED ORAL 2 TIMES DAILY PRN
Status: DISCONTINUED | OUTPATIENT
Start: 2023-08-28 | End: 2023-08-30 | Stop reason: HOSPADM

## 2023-08-28 RX ORDER — NITROFURANTOIN 25; 75 MG/1; MG/1
100 CAPSULE ORAL EVERY 12 HOURS SCHEDULED
Status: DISCONTINUED | OUTPATIENT
Start: 2023-08-28 | End: 2023-08-30 | Stop reason: HOSPADM

## 2023-08-28 RX ADMIN — SODIUM CHLORIDE, PRESERVATIVE FREE 10 ML: 5 INJECTION INTRAVENOUS at 00:24

## 2023-08-28 RX ADMIN — LEVOTHYROXINE SODIUM 75 MCG: 75 TABLET ORAL at 05:59

## 2023-08-28 RX ADMIN — MIDODRINE HYDROCHLORIDE 5 MG: 5 TABLET ORAL at 12:46

## 2023-08-28 RX ADMIN — NITROFURANTOIN MONOHYDRATE/MACROCRYSTALS 100 MG: 25; 75 CAPSULE ORAL at 21:08

## 2023-08-28 RX ADMIN — SODIUM CHLORIDE, PRESERVATIVE FREE 10 ML: 5 INJECTION INTRAVENOUS at 21:08

## 2023-08-28 RX ADMIN — MIDODRINE HYDROCHLORIDE 5 MG: 5 TABLET ORAL at 17:52

## 2023-08-28 RX ADMIN — METOPROLOL SUCCINATE 25 MG: 25 TABLET, EXTENDED RELEASE ORAL at 10:25

## 2023-08-28 RX ADMIN — ACETAMINOPHEN 650 MG: 325 TABLET ORAL at 23:07

## 2023-08-28 RX ADMIN — MIDODRINE HYDROCHLORIDE 5 MG: 5 TABLET ORAL at 10:25

## 2023-08-28 RX ADMIN — WARFARIN SODIUM 5 MG: 5 TABLET ORAL at 17:52

## 2023-08-28 RX ADMIN — SODIUM CHLORIDE, PRESERVATIVE FREE 10 ML: 5 INJECTION INTRAVENOUS at 10:26

## 2023-08-28 RX ADMIN — TRAMADOL HYDROCHLORIDE 50 MG: 50 TABLET, COATED ORAL at 17:52

## 2023-08-28 ASSESSMENT — ENCOUNTER SYMPTOMS
BLOOD IN STOOL: 0
COLOR CHANGE: 0
DIARRHEA: 0
ABDOMINAL PAIN: 0
BACK PAIN: 0
CONSTIPATION: 0
EYE PAIN: 0
COUGH: 0
SHORTNESS OF BREATH: 0
NAUSEA: 0
PHOTOPHOBIA: 0
CHEST TIGHTNESS: 0
VOMITING: 0
WHEEZING: 0

## 2023-08-28 ASSESSMENT — PAIN SCALES - GENERAL
PAINLEVEL_OUTOF10: 2
PAINLEVEL_OUTOF10: 8
PAINLEVEL_OUTOF10: 0

## 2023-08-28 ASSESSMENT — PAIN SCALES - WONG BAKER
WONGBAKER_NUMERICALRESPONSE: 0

## 2023-08-28 ASSESSMENT — PAIN DESCRIPTION - DESCRIPTORS: DESCRIPTORS: ACHING

## 2023-08-28 ASSESSMENT — PAIN DESCRIPTION - LOCATION: LOCATION: BACK

## 2023-08-28 ASSESSMENT — PAIN DESCRIPTION - ORIENTATION: ORIENTATION: LOWER

## 2023-08-28 NOTE — CARE COORDINATION
ROSMERYW spoke with pt dght Annmarie regarding PT/OT recs for Swing Bed. Dght stated she does not want pt to go to the Swing Bed Unit. [facility] is requesting Yaneli Argueta. LSW called [facility] and left message asking for their admissions person to return this call.

## 2023-08-28 NOTE — DISCHARGE INSTRUCTIONS
Avoid excessive bending/twisting of lumbar spine. Wear brace whenever upright and out of bed. You can remove the brace when you are laying in bed or on the couch. Limit weight restrictions to less than 15 pounds for 3 months. Follow-up in office in 6 weeks with repeat xrays. Notify the orthopedic office urgently if you begin to develop any worsening pain at fracture site, numbness or tingling in extremities, weakness in extremities, or loss of bowel or bladder control.

## 2023-08-29 LAB
ANION GAP SERPL CALCULATED.3IONS-SCNC: 14 MMOL/L (ref 4–16)
BASOPHILS ABSOLUTE: 0.1 K/CU MM
BASOPHILS RELATIVE PERCENT: 0.5 % (ref 0–1)
BUN SERPL-MCNC: 17 MG/DL (ref 6–23)
CALCIUM SERPL-MCNC: 7.9 MG/DL (ref 8.3–10.6)
CHLORIDE BLD-SCNC: 95 MMOL/L (ref 99–110)
CO2: 22 MMOL/L (ref 21–32)
CREAT SERPL-MCNC: 0.9 MG/DL (ref 0.6–1.1)
DIFFERENTIAL TYPE: ABNORMAL
EOSINOPHILS ABSOLUTE: 0.7 K/CU MM
EOSINOPHILS RELATIVE PERCENT: 4.9 % (ref 0–3)
GFR SERPL CREATININE-BSD FRML MDRD: >60 ML/MIN/1.73M2
GLUCOSE SERPL-MCNC: 94 MG/DL (ref 70–99)
HCT VFR BLD CALC: 41.5 % (ref 37–47)
HEMOGLOBIN: 12.5 GM/DL (ref 12.5–16)
IMMATURE NEUTROPHIL %: 1.3 % (ref 0–0.43)
INR BLD: 2.2 INDEX
LYMPHOCYTES ABSOLUTE: 2.5 K/CU MM
LYMPHOCYTES RELATIVE PERCENT: 18.7 % (ref 24–44)
MCH RBC QN AUTO: 30.6 PG (ref 27–31)
MCHC RBC AUTO-ENTMCNC: 30.1 % (ref 32–36)
MCV RBC AUTO: 101.5 FL (ref 78–100)
MONOCYTES ABSOLUTE: 1.2 K/CU MM
MONOCYTES RELATIVE PERCENT: 8.8 % (ref 0–4)
NUCLEATED RBC %: 0 %
PDW BLD-RTO: 14.6 % (ref 11.7–14.9)
PLATELET # BLD: 319 K/CU MM (ref 140–440)
PMV BLD AUTO: 9.2 FL (ref 7.5–11.1)
POTASSIUM SERPL-SCNC: 4.5 MMOL/L (ref 3.5–5.1)
PROTHROMBIN TIME: 24.5 SECONDS (ref 11.7–14.5)
RBC # BLD: 4.09 M/CU MM (ref 4.2–5.4)
SEGMENTED NEUTROPHILS ABSOLUTE COUNT: 8.8 K/CU MM
SEGMENTED NEUTROPHILS RELATIVE PERCENT: 65.8 % (ref 36–66)
SODIUM BLD-SCNC: 131 MMOL/L (ref 135–145)
T4 FREE SERPL-MCNC: 1.81 NG/DL (ref 0.9–1.8)
TOTAL IMMATURE NEUTOROPHIL: 0.17 K/CU MM
TOTAL NUCLEATED RBC: 0 K/CU MM
WBC # BLD: 13.3 K/CU MM (ref 4–10.5)

## 2023-08-29 PROCEDURE — 85610 PROTHROMBIN TIME: CPT

## 2023-08-29 PROCEDURE — 85025 COMPLETE CBC W/AUTO DIFF WBC: CPT

## 2023-08-29 PROCEDURE — 84439 ASSAY OF FREE THYROXINE: CPT

## 2023-08-29 PROCEDURE — 36415 COLL VENOUS BLD VENIPUNCTURE: CPT

## 2023-08-29 PROCEDURE — 6360000002 HC RX W HCPCS: Performed by: HOSPITALIST

## 2023-08-29 PROCEDURE — 1200000000 HC SEMI PRIVATE

## 2023-08-29 PROCEDURE — 80048 BASIC METABOLIC PNL TOTAL CA: CPT

## 2023-08-29 PROCEDURE — 94761 N-INVAS EAR/PLS OXIMETRY MLT: CPT

## 2023-08-29 PROCEDURE — 6370000000 HC RX 637 (ALT 250 FOR IP): Performed by: INTERNAL MEDICINE

## 2023-08-29 PROCEDURE — 6370000000 HC RX 637 (ALT 250 FOR IP): Performed by: HOSPITALIST

## 2023-08-29 PROCEDURE — 99232 SBSQ HOSP IP/OBS MODERATE 35: CPT | Performed by: INTERNAL MEDICINE

## 2023-08-29 PROCEDURE — 2580000003 HC RX 258: Performed by: HOSPITALIST

## 2023-08-29 RX ADMIN — SODIUM CHLORIDE, PRESERVATIVE FREE 10 ML: 5 INJECTION INTRAVENOUS at 10:13

## 2023-08-29 RX ADMIN — SODIUM CHLORIDE, PRESERVATIVE FREE 10 ML: 5 INJECTION INTRAVENOUS at 12:05

## 2023-08-29 RX ADMIN — LEVOTHYROXINE SODIUM 75 MCG: 75 TABLET ORAL at 06:13

## 2023-08-29 RX ADMIN — METOPROLOL SUCCINATE 25 MG: 25 TABLET, EXTENDED RELEASE ORAL at 10:11

## 2023-08-29 RX ADMIN — ONDANSETRON 4 MG: 2 INJECTION INTRAMUSCULAR; INTRAVENOUS at 23:51

## 2023-08-29 RX ADMIN — WARFARIN SODIUM 5 MG: 5 TABLET ORAL at 17:30

## 2023-08-29 RX ADMIN — NITROFURANTOIN MONOHYDRATE/MACROCRYSTALS 100 MG: 25; 75 CAPSULE ORAL at 20:06

## 2023-08-29 RX ADMIN — MIDODRINE HYDROCHLORIDE 5 MG: 5 TABLET ORAL at 10:11

## 2023-08-29 RX ADMIN — SODIUM CHLORIDE, PRESERVATIVE FREE 10 ML: 5 INJECTION INTRAVENOUS at 20:06

## 2023-08-29 RX ADMIN — MIDODRINE HYDROCHLORIDE 5 MG: 5 TABLET ORAL at 17:30

## 2023-08-29 RX ADMIN — NITROFURANTOIN MONOHYDRATE/MACROCRYSTALS 100 MG: 25; 75 CAPSULE ORAL at 10:11

## 2023-08-29 RX ADMIN — MIDODRINE HYDROCHLORIDE 5 MG: 5 TABLET ORAL at 12:04

## 2023-08-29 ASSESSMENT — PAIN SCALES - WONG BAKER
WONGBAKER_NUMERICALRESPONSE: 0
WONGBAKER_NUMERICALRESPONSE: 0

## 2023-08-29 NOTE — DISCHARGE INSTR - COC
Continuity of Care Form    Patient Name: Manny Burrows   :  1935  MRN:  [id number]    Admit date:  2023  Discharge date:  2023    Code Status Order: Full Code   Advance Directives:     Admitting Physician:  Juan Sommer MD  PCP: Talisha Lira MD    Discharging Nurse: Jing Christopher RN  [facility] Unit/Room#: 9362/9438-C  Discharging Unit Phone Number: [phone]    Emergency Contact:   Extended Emergency Contact Information  Primary Emergency Contact:  Meli [facility] of [address] [place] Phone: [phone]  Relation: Child  Secondary Emergency Contact: GeorginaLucy  Address: [address], [facility] of [address] [place] Phone: [phone]  Relation: Child    Past Surgical History:  Past Surgical History:   Procedure Laterality Date    ABDOMEN SURGERY      colon surgery, had diverticulitis, had colon resection    CATARACT REMOVAL WITH IMPLANT Right 2019    by Dr. Parul Bocanegra Left 2019    by Dr. Rizwana Kaiser  2018    INTRACAPSULAR CATARACT EXTRACTION Right 2019    EYE CATARACT EMULSIFICATION IOL IMPLANT performed by Hyun Patel MD at [address] [place] [address] And [address] [place] [address] Left 2019    EYE CATARACT EMULSIFICATION IOL IMPLANT performed by Hyun Patel MD at [facility] knee replacement    JOINT REPLACEMENT      left shoulder replacement    THYROID SURGERY      goiter       Immunization History:   Immunization History   Administered Date(s) Administered    Influenza Virus Vaccine 10/13/2014    Pneumococcal, PPSV23, PNEUMOVAX 23, (age 2y+), SC/IM, 0.5mL 2013, 10/13/2013, 2015    TDaP, ADACEL (age 6y-58y), [facility] (age 10y+), IM, 0.5mL 2013, 2014, 2014       Active Problems:  Patient Active Problem List   Diagnosis Code    Nocturnal leg cramps G47.62    Restless

## 2023-08-29 NOTE — CARE COORDINATION
ROSMERYW spoke with Shawn Trevino with [address] and gave referral. He will look over pt info and will let this know if they can take pt. Pt will need a precert.

## 2023-08-29 NOTE — CARE COORDINATION
Billy Prado from [address] stated they can take pt is stable and precert has been received. PASS completed and packet started. Precert pending.

## 2023-08-30 ENCOUNTER — APPOINTMENT (OUTPATIENT)
Dept: CT IMAGING | Age: 88
DRG: 947 | End: 2023-08-30
Payer: MEDICARE

## 2023-08-30 ENCOUNTER — APPOINTMENT (OUTPATIENT)
Dept: GENERAL RADIOLOGY | Age: 88
DRG: 947 | End: 2023-08-30
Payer: MEDICARE

## 2023-08-30 VITALS
HEIGHT: 64 IN | OXYGEN SATURATION: 98 % | SYSTOLIC BLOOD PRESSURE: 106 MMHG | RESPIRATION RATE: 17 BRPM | BODY MASS INDEX: 25.49 KG/M2 | HEART RATE: 66 BPM | WEIGHT: 149.3 LBS | TEMPERATURE: 97.6 F | DIASTOLIC BLOOD PRESSURE: 58 MMHG

## 2023-08-30 LAB
ANION GAP SERPL CALCULATED.3IONS-SCNC: 12 MMOL/L (ref 4–16)
BUN SERPL-MCNC: 13 MG/DL (ref 6–23)
CALCIUM SERPL-MCNC: 8.3 MG/DL (ref 8.3–10.6)
CHLORIDE BLD-SCNC: 97 MMOL/L (ref 99–110)
CO2: 21 MMOL/L (ref 21–32)
CREAT SERPL-MCNC: 0.8 MG/DL (ref 0.6–1.1)
GFR SERPL CREATININE-BSD FRML MDRD: >60 ML/MIN/1.73M2
GLUCOSE SERPL-MCNC: 98 MG/DL (ref 70–99)
HCT VFR BLD CALC: 37.7 % (ref 37–47)
HEMOGLOBIN: 12.2 GM/DL (ref 12.5–16)
INR BLD: 2.4 INDEX
MCH RBC QN AUTO: 30.5 PG (ref 27–31)
MCHC RBC AUTO-ENTMCNC: 32.4 % (ref 32–36)
MCV RBC AUTO: 94.3 FL (ref 78–100)
PDW BLD-RTO: 14.4 % (ref 11.7–14.9)
PHOSPHORUS: 2.9 MG/DL (ref 2.5–4.9)
PLATELET # BLD: 317 K/CU MM (ref 140–440)
PMV BLD AUTO: 9.6 FL (ref 7.5–11.1)
POTASSIUM SERPL-SCNC: 3.7 MMOL/L (ref 3.5–5.1)
PROTHROMBIN TIME: 26.4 SECONDS (ref 11.7–14.5)
RBC # BLD: 4 M/CU MM (ref 4.2–5.4)
SARS-COV-2 RDRP RESP QL NAA+PROBE: NOT DETECTED
SODIUM BLD-SCNC: 130 MMOL/L (ref 135–145)
SOURCE: NORMAL
WBC # BLD: 9.9 K/CU MM (ref 4–10.5)

## 2023-08-30 PROCEDURE — 6370000000 HC RX 637 (ALT 250 FOR IP): Performed by: INTERNAL MEDICINE

## 2023-08-30 PROCEDURE — 6370000000 HC RX 637 (ALT 250 FOR IP): Performed by: HOSPITALIST

## 2023-08-30 PROCEDURE — 2580000003 HC RX 258: Performed by: HOSPITALIST

## 2023-08-30 PROCEDURE — 94761 N-INVAS EAR/PLS OXIMETRY MLT: CPT

## 2023-08-30 PROCEDURE — 87635 SARS-COV-2 COVID-19 AMP PRB: CPT

## 2023-08-30 PROCEDURE — 80048 BASIC METABOLIC PNL TOTAL CA: CPT

## 2023-08-30 PROCEDURE — 72100 X-RAY EXAM L-S SPINE 2/3 VWS: CPT

## 2023-08-30 PROCEDURE — 84100 ASSAY OF PHOSPHORUS: CPT

## 2023-08-30 PROCEDURE — 99232 SBSQ HOSP IP/OBS MODERATE 35: CPT | Performed by: INTERNAL MEDICINE

## 2023-08-30 PROCEDURE — 72131 CT LUMBAR SPINE W/O DYE: CPT

## 2023-08-30 PROCEDURE — 85610 PROTHROMBIN TIME: CPT

## 2023-08-30 PROCEDURE — 36415 COLL VENOUS BLD VENIPUNCTURE: CPT

## 2023-08-30 PROCEDURE — 85027 COMPLETE CBC AUTOMATED: CPT

## 2023-08-30 RX ORDER — NITROFURANTOIN 25; 75 MG/1; MG/1
100 CAPSULE ORAL EVERY 12 HOURS SCHEDULED
Qty: 6 CAPSULE | Refills: 0 | Status: SHIPPED | OUTPATIENT
Start: 2023-08-30 | End: 2023-09-02

## 2023-08-30 RX ORDER — LEVOTHYROXINE SODIUM 0.05 MG/1
50 TABLET ORAL DAILY
Status: DISCONTINUED | OUTPATIENT
Start: 2023-08-31 | End: 2023-08-30 | Stop reason: HOSPADM

## 2023-08-30 RX ORDER — TRAMADOL HYDROCHLORIDE 50 MG/1
50 TABLET ORAL EVERY 6 HOURS PRN
Qty: 12 TABLET | Refills: 0 | Status: SHIPPED | OUTPATIENT
Start: 2023-08-30 | End: 2023-09-02

## 2023-08-30 RX ORDER — LEVOTHYROXINE SODIUM 0.05 MG/1
50 TABLET ORAL DAILY
Qty: 30 TABLET | Refills: 3 | Status: SHIPPED | OUTPATIENT
Start: 2023-08-31

## 2023-08-30 RX ORDER — METHOCARBAMOL 500 MG/1
500 TABLET, FILM COATED ORAL 2 TIMES DAILY PRN
DISCHARGE
Start: 2023-08-30 | End: 2023-09-09

## 2023-08-30 RX ADMIN — ACETAMINOPHEN 650 MG: 325 TABLET ORAL at 09:07

## 2023-08-30 RX ADMIN — SODIUM CHLORIDE, PRESERVATIVE FREE 10 ML: 5 INJECTION INTRAVENOUS at 09:07

## 2023-08-30 RX ADMIN — MIDODRINE HYDROCHLORIDE 5 MG: 5 TABLET ORAL at 11:44

## 2023-08-30 RX ADMIN — METOPROLOL SUCCINATE 25 MG: 25 TABLET, EXTENDED RELEASE ORAL at 09:07

## 2023-08-30 RX ADMIN — LEVOTHYROXINE SODIUM 75 MCG: 75 TABLET ORAL at 06:35

## 2023-08-30 RX ADMIN — MIDODRINE HYDROCHLORIDE 5 MG: 5 TABLET ORAL at 09:06

## 2023-08-30 RX ADMIN — NITROFURANTOIN MONOHYDRATE/MACROCRYSTALS 100 MG: 25; 75 CAPSULE ORAL at 09:07

## 2023-08-30 ASSESSMENT — PAIN SCALES - GENERAL: PAINLEVEL_OUTOF10: 3

## 2023-08-30 ASSESSMENT — PAIN DESCRIPTION - LOCATION: LOCATION: BACK

## 2023-08-30 ASSESSMENT — PAIN - FUNCTIONAL ASSESSMENT: PAIN_FUNCTIONAL_ASSESSMENT: ACTIVITIES ARE NOT PREVENTED

## 2023-08-30 ASSESSMENT — PAIN DESCRIPTION - DESCRIPTORS: DESCRIPTORS: ACHING

## 2023-08-30 ASSESSMENT — PAIN DESCRIPTION - ORIENTATION: ORIENTATION: LOWER

## 2023-08-30 ASSESSMENT — PAIN DESCRIPTION - PAIN TYPE: TYPE: CHRONIC PAIN

## 2023-08-30 NOTE — CARE COORDINATION
Pt is on discharge to go to [facility]. [facility] to  pt at 3;00PM.[facility] paperwork completed on both sides and placed on packet. ROSMERYW faxed AVS with both SABA to [facility] and placed copy in packet. GOLDEN and Ninfa Ambriz with [facility] aware of  time. ROSMERYW called tp [facility] Annmarie and left her a message informing her of discharge and time of  to go to [address] [place] [address],[address].

## 2023-08-30 NOTE — CARE COORDINATION
Pt has been approved to go to [facility]. Doctor at this [facility] desk and was told about approval.     Pt will need a rapid COVID on day of discharge. CM will need SABA completed by doctor and RN. CM will need SABA to be completed by RN and doctor. If pt is discharged after hours please complete the following. ... Call report to [phone]  Fax completed AVS with both SABA on the AVS and any written Rx [phone]. Set up transportation with [place] and call family.

## 2023-08-30 NOTE — DISCHARGE SUMMARY
PHYSICAL EXAM     See today's progress note    BMP/CBC  Recent Labs     08/28/23  0155 08/28/23  1005 08/29/23  0054 08/30/23  0056   *  --  131* 130*   K 3.5  --  4.5 3.7   CL 96*  --  95* 97*   CO2 21  --  22 21   BUN 21  --  17 13   CREATININE 0.8  --  0.9 0.8   WBC  --  11.7* 13.3* 9.9   HCT  --  42.0 41.5 37.7   PLT  --  [phone]       Discharge Time of 35 minutes    Electronically signed by Joanna Madison MD on 8/30/2023 at 11:10 AM

## 2023-09-11 ENCOUNTER — TELEPHONE (OUTPATIENT)
Dept: CARDIOLOGY CLINIC | Age: 88
End: 2023-09-11

## 2023-09-11 NOTE — TELEPHONE ENCOUNTER
Spoke with patient's daughter. Patient at [facility] and on Coumadin. Daughter to discuss with patient. This nurse will call daughter back later in the week.

## 2023-09-14 ENCOUNTER — TELEPHONE (OUTPATIENT)
Dept: CARDIOLOGY CLINIC | Age: 88
End: 2023-09-14

## 2023-09-14 NOTE — TELEPHONE ENCOUNTER
Patient's daughter called. Daughter was to speak to patient, then call to schedule Watchman consult. Left VM. Requesting return call.

## 2023-10-05 ENCOUNTER — TELEPHONE (OUTPATIENT)
Dept: CARDIOLOGY CLINIC | Age: 88
End: 2023-10-05

## 2023-10-05 NOTE — TELEPHONE ENCOUNTER
Patients daughter called wanting to cancel patients apt for tomorrow 10/6/2023. States patient has been \"real sick\". Asked if she wanted to r/s apt she, Iza Jenkins stated no not at this time, they will call back when patient is ready. I also sent Nadiya Carlisle RN Watchman coordinator an email letting her know patient is not ready to schedule watchman.

## (undated) DEVICE — SET ADMIN L105IN 10GTT 3 NDL FREE CK VLV 2 PC M LUERLOCK

## (undated) DEVICE — SET EXTN L41IN 2 NDL FREE VALVES FREE INJ PRT

## (undated) DEVICE — ELECTRODE,RADIOTRANSLUCENT,FOAM,5PK: Brand: MEDLINE

## (undated) DEVICE — LINE SAMP O2 6.5FT W/FEMALE CONN F/ADULT CAPNOLINE PLUS

## (undated) DEVICE — HYPODERMIC SAFETY NEEDLE: Brand: MAGELLAN

## (undated) DEVICE — GLOVE ORANGE PI 7   MSG9070

## (undated) DEVICE — GLOVE ORANGE PI 7 1/2   MSG9075

## (undated) DEVICE — MICROSURGICAL INSTRUMENT RETROBULAR NEEDLE 25GA X 38MM (1 1/2 IN): Brand: ALCON

## (undated) DEVICE — NEEDLE HYPO 23GA L1.5IN TURQ POLYPR HUB S STL THN WALL IM

## (undated) DEVICE — GLOVE SURG SZ 65 THK91MIL LTX FREE SYN POLYISOPRENE

## (undated) DEVICE — SOLUTION IRRIG 250ML STRL H2O PLAS POUR BTL USP

## (undated) DEVICE — STERILE LATEX POWDER-FREE SURGICAL GLOVESWITH NITRILE COATING: Brand: PROTEXIS

## (undated) DEVICE — Device

## (undated) DEVICE — TOWEL,OR,DSP,ST,BLUE,STD,6/PK,12PK/CS: Brand: MEDLINE

## (undated) DEVICE — BLADE OPHTH 180DEG CUT SURF BLU STR SHRP DBL BVL GRINDLESS